# Patient Record
Sex: FEMALE | Race: WHITE | NOT HISPANIC OR LATINO | Employment: UNEMPLOYED | URBAN - NONMETROPOLITAN AREA
[De-identification: names, ages, dates, MRNs, and addresses within clinical notes are randomized per-mention and may not be internally consistent; named-entity substitution may affect disease eponyms.]

---

## 2018-01-31 ENCOUNTER — TELEPHONE (OUTPATIENT)
Dept: URGENT CARE | Facility: CLINIC | Age: 28
End: 2018-01-31

## 2018-02-01 ENCOUNTER — TELEPHONE (OUTPATIENT)
Dept: URGENT CARE | Facility: CLINIC | Age: 28
End: 2018-02-01

## 2018-02-01 DIAGNOSIS — R10.11 RUQ ABDOMINAL PAIN: Primary | ICD-10-CM

## 2018-02-01 NOTE — TELEPHONE ENCOUNTER
Pt called for lab results. She has watched diet and seen some improvement in RUQ pain. Would like to be sent to GI if possible or get recommendation on treatment.   Gave permission to call her mother Megha at [phone] with information.

## 2018-02-02 ENCOUNTER — HOSPITAL ENCOUNTER (OUTPATIENT)
Dept: ULTRASOUND IMAGING | Facility: HOSPITAL | Age: 28
Discharge: HOME | End: 2018-02-02
Admitting: FAMILY MEDICINE

## 2018-02-02 DIAGNOSIS — R10.11 RUQ ABDOMINAL PAIN: ICD-10-CM

## 2018-02-02 PROCEDURE — 76705 ECHO EXAM OF ABDOMEN: CPT

## 2018-12-31 ENCOUNTER — HOSPITAL ENCOUNTER (EMERGENCY)
Facility: HOSPITAL | Age: 28
Discharge: HOME | End: 2018-12-31
Attending: EMERGENCY MEDICINE | Admitting: EMERGENCY MEDICINE

## 2018-12-31 ENCOUNTER — APPOINTMENT (OUTPATIENT)
Dept: GENERAL RADIOLOGY | Facility: HOSPITAL | Age: 28
End: 2018-12-31

## 2018-12-31 VITALS
BODY MASS INDEX: 38.92 KG/M2 | HEIGHT: 66 IN | DIASTOLIC BLOOD PRESSURE: 25 MMHG | SYSTOLIC BLOOD PRESSURE: 119 MMHG | RESPIRATION RATE: 18 BRPM | HEART RATE: 80 BPM | TEMPERATURE: 97.4 F | WEIGHT: 242.2 LBS | OXYGEN SATURATION: 100 %

## 2018-12-31 DIAGNOSIS — R00.2 INTERMITTENT PALPITATIONS: ICD-10-CM

## 2018-12-31 DIAGNOSIS — R42 EPISODE OF DIZZINESS: Primary | ICD-10-CM

## 2018-12-31 LAB
ALBUMIN SERPL-MCNC: 4.4 G/DL (ref 3.5–5)
ALBUMIN/GLOB SERPL: 1.4 G/DL (ref 1–2)
ALP SERPL-CCNC: 115 U/L (ref 38–126)
ALT SERPL W P-5'-P-CCNC: 19 U/L (ref 13–69)
ANION GAP SERPL CALCULATED.3IONS-SCNC: 10.6 MMOL/L (ref 10–20)
AST SERPL-CCNC: 18 U/L (ref 15–46)
B-HCG UR QL: NEGATIVE
BASOPHILS # BLD AUTO: 0.07 10*3/MM3 (ref 0–0.2)
BASOPHILS NFR BLD AUTO: 0.6 % (ref 0–2.5)
BILIRUB SERPL-MCNC: 0.7 MG/DL (ref 0.2–1.3)
BUN BLD-MCNC: 9 MG/DL (ref 7–20)
BUN/CREAT SERPL: 15 (ref 7.1–23.5)
CALCIUM SPEC-SCNC: 8.8 MG/DL (ref 8.4–10.2)
CHLORIDE SERPL-SCNC: 106 MMOL/L (ref 98–107)
CO2 SERPL-SCNC: 26 MMOL/L (ref 26–30)
CREAT BLD-MCNC: 0.6 MG/DL (ref 0.6–1.3)
DEPRECATED RDW RBC AUTO: 42.4 FL (ref 37–54)
EOSINOPHIL # BLD AUTO: 0.12 10*3/MM3 (ref 0–0.7)
EOSINOPHIL NFR BLD AUTO: 1 % (ref 0–7)
ERYTHROCYTE [DISTWIDTH] IN BLOOD BY AUTOMATED COUNT: 13.8 % (ref 11.5–14.5)
GFR SERPL CREATININE-BSD FRML MDRD: 120 ML/MIN/1.73
GLOBULIN UR ELPH-MCNC: 3.2 GM/DL
GLUCOSE BLD-MCNC: 89 MG/DL (ref 74–98)
HCT VFR BLD AUTO: 37.1 % (ref 37–47)
HGB BLD-MCNC: 12.2 G/DL (ref 12–16)
IMM GRANULOCYTES # BLD AUTO: 0.04 10*3/MM3 (ref 0–0.06)
IMM GRANULOCYTES NFR BLD AUTO: 0.3 % (ref 0–0.6)
LIPASE SERPL-CCNC: 31 U/L (ref 23–300)
LYMPHOCYTES # BLD AUTO: 1.9 10*3/MM3 (ref 0.6–3.4)
LYMPHOCYTES NFR BLD AUTO: 15.3 % (ref 10–50)
MCH RBC QN AUTO: 27.6 PG (ref 27–31)
MCHC RBC AUTO-ENTMCNC: 32.9 G/DL (ref 30–37)
MCV RBC AUTO: 83.9 FL (ref 81–99)
MONOCYTES # BLD AUTO: 0.67 10*3/MM3 (ref 0–0.9)
MONOCYTES NFR BLD AUTO: 5.4 % (ref 0–12)
NEUTROPHILS # BLD AUTO: 9.64 10*3/MM3 (ref 2–6.9)
NEUTROPHILS NFR BLD AUTO: 77.4 % (ref 37–80)
NRBC BLD AUTO-RTO: 0 /100 WBC (ref 0–0)
PLATELET # BLD AUTO: 239 10*3/MM3 (ref 130–400)
PMV BLD AUTO: 9.6 FL (ref 6–12)
POTASSIUM BLD-SCNC: 3.6 MMOL/L (ref 3.5–5.1)
PROT SERPL-MCNC: 7.6 G/DL (ref 6.3–8.2)
RBC # BLD AUTO: 4.42 10*6/MM3 (ref 4.2–5.4)
SODIUM BLD-SCNC: 139 MMOL/L (ref 137–145)
TROPONIN I SERPL-MCNC: <0.012 NG/ML (ref 0–0.03)
WBC NRBC COR # BLD: 12.44 10*3/MM3 (ref 4.8–10.8)

## 2018-12-31 PROCEDURE — 71046 X-RAY EXAM CHEST 2 VIEWS: CPT

## 2018-12-31 PROCEDURE — 93005 ELECTROCARDIOGRAM TRACING: CPT | Performed by: PHYSICIAN ASSISTANT

## 2018-12-31 PROCEDURE — 80053 COMPREHEN METABOLIC PANEL: CPT | Performed by: PHYSICIAN ASSISTANT

## 2018-12-31 PROCEDURE — 83690 ASSAY OF LIPASE: CPT | Performed by: PHYSICIAN ASSISTANT

## 2018-12-31 PROCEDURE — 81025 URINE PREGNANCY TEST: CPT | Performed by: PHYSICIAN ASSISTANT

## 2018-12-31 PROCEDURE — 85025 COMPLETE CBC W/AUTO DIFF WBC: CPT | Performed by: PHYSICIAN ASSISTANT

## 2018-12-31 PROCEDURE — 96360 HYDRATION IV INFUSION INIT: CPT

## 2018-12-31 PROCEDURE — 84484 ASSAY OF TROPONIN QUANT: CPT | Performed by: PHYSICIAN ASSISTANT

## 2018-12-31 PROCEDURE — 99283 EMERGENCY DEPT VISIT LOW MDM: CPT

## 2018-12-31 RX ORDER — SODIUM CHLORIDE 0.9 % (FLUSH) 0.9 %
10 SYRINGE (ML) INJECTION AS NEEDED
Status: DISCONTINUED | OUTPATIENT
Start: 2018-12-31 | End: 2018-12-31 | Stop reason: HOSPADM

## 2018-12-31 RX ADMIN — SODIUM CHLORIDE 1000 ML: 9 INJECTION, SOLUTION INTRAVENOUS at 15:28

## 2019-01-17 ENCOUNTER — OFFICE VISIT (OUTPATIENT)
Dept: FAMILY MEDICINE CLINIC | Facility: CLINIC | Age: 29
End: 2019-01-17

## 2019-01-17 VITALS
HEIGHT: 66 IN | HEART RATE: 95 BPM | SYSTOLIC BLOOD PRESSURE: 124 MMHG | BODY MASS INDEX: 38.41 KG/M2 | RESPIRATION RATE: 14 BRPM | WEIGHT: 239 LBS | DIASTOLIC BLOOD PRESSURE: 82 MMHG | OXYGEN SATURATION: 99 %

## 2019-01-17 DIAGNOSIS — R53.83 MALAISE AND FATIGUE: ICD-10-CM

## 2019-01-17 DIAGNOSIS — Z00.00 ROUTINE GENERAL MEDICAL EXAMINATION AT A HEALTH CARE FACILITY: Primary | ICD-10-CM

## 2019-01-17 DIAGNOSIS — G43.C0 PERIODIC HEADACHE SYNDROME, NOT INTRACTABLE: ICD-10-CM

## 2019-01-17 DIAGNOSIS — R53.81 MALAISE AND FATIGUE: ICD-10-CM

## 2019-01-17 DIAGNOSIS — K59.09 OTHER CONSTIPATION: ICD-10-CM

## 2019-01-17 LAB
BASOPHILS # BLD AUTO: 0.06 10*3/MM3 (ref 0–0.2)
BASOPHILS NFR BLD AUTO: 0.7 % (ref 0–2.5)
EOSINOPHIL # BLD AUTO: 0.17 10*3/MM3 (ref 0–0.7)
EOSINOPHIL NFR BLD AUTO: 2.1 % (ref 0–7)
ERYTHROCYTE [DISTWIDTH] IN BLOOD BY AUTOMATED COUNT: 13.9 % (ref 11.5–14.5)
FERRITIN SERPL-MCNC: 46.9 NG/ML (ref 6.24–137)
HCT VFR BLD AUTO: 40.6 % (ref 37–47)
HGB BLD-MCNC: 12.9 G/DL (ref 12–16)
IMM GRANULOCYTES # BLD AUTO: 0.02 10*3/MM3 (ref 0–0.06)
IMM GRANULOCYTES NFR BLD AUTO: 0.2 % (ref 0–0.6)
IRON SATN MFR SERPL: 20 % (ref 11–46)
IRON SERPL-MCNC: 67 MCG/DL (ref 37–181)
LYMPHOCYTES # BLD AUTO: 1.96 10*3/MM3 (ref 0.6–3.4)
LYMPHOCYTES NFR BLD AUTO: 23.8 % (ref 10–50)
MCH RBC QN AUTO: 28.2 PG (ref 27–31)
MCHC RBC AUTO-ENTMCNC: 31.8 G/DL (ref 30–37)
MCV RBC AUTO: 88.6 FL (ref 81–99)
MONOCYTES # BLD AUTO: 0.54 10*3/MM3 (ref 0–0.9)
MONOCYTES NFR BLD AUTO: 6.6 % (ref 0–12)
NEUTROPHILS # BLD AUTO: 5.47 10*3/MM3 (ref 2–6.9)
NEUTROPHILS NFR BLD AUTO: 66.6 % (ref 37–80)
NRBC BLD AUTO-RTO: 0 /100 WBC (ref 0–0)
PLATELET # BLD AUTO: 246 10*3/MM3 (ref 130–400)
RBC # BLD AUTO: 4.58 10*6/MM3 (ref 4.2–5.4)
T4 FREE SERPL-MCNC: 1 NG/DL (ref 0.78–2.19)
TIBC SERPL-MCNC: 336 MCG/DL (ref 261–497)
TSH SERPL DL<=0.005 MIU/L-ACNC: 0.93 MIU/ML (ref 0.47–4.68)
UIBC SERPL-MCNC: 269 MCG/DL
VIT B12 SERPL-MCNC: 317 PG/ML (ref 239–931)
WBC # BLD AUTO: 8.22 10*3/MM3 (ref 4.8–10.8)

## 2019-01-17 PROCEDURE — 99385 PREV VISIT NEW AGE 18-39: CPT | Performed by: FAMILY MEDICINE

## 2019-01-17 NOTE — PATIENT INSTRUCTIONS
Preventive Care 18-39 Years, Female  Preventive care refers to lifestyle choices and visits with your health care provider that can promote health and wellness.  What does preventive care include?  · A yearly physical exam. This is also called an annual well check.  · Dental exams once or twice a year.  · Routine eye exams. Ask your health care provider how often you should have your eyes checked.  · Personal lifestyle choices, including:  ? Daily care of your teeth and gums.  ? Regular physical activity.  ? Eating a healthy diet.  ? Avoiding tobacco and drug use.  ? Limiting alcohol use.  ? Practicing safe sex.  ? Taking vitamin and mineral supplements as recommended by your health care provider.  What happens during an annual well check?  The services and screenings done by your health care provider during your annual well check will depend on your age, overall health, lifestyle risk factors, and family history of disease.  Counseling  Your health care provider may ask you questions about your:  · Alcohol use.  · Tobacco use.  · Drug use.  · Emotional well-being.  · Home and relationship well-being.  · Sexual activity.  · Eating habits.  · Work and work environment.  · Method of birth control.  · Menstrual cycle.  · Pregnancy history.    Screening  You may have the following tests or measurements:  · Height, weight, and BMI.  · Diabetes screening. This is done by checking your blood sugar (glucose) after you have not eaten for a while (fasting).  · Blood pressure.  · Lipid and cholesterol levels. These may be checked every 5 years starting at age 20.  · Skin check.  · Hepatitis C blood test.  · Hepatitis B blood test.  · Sexually transmitted disease (STD) testing.  · BRCA-related cancer screening. This may be done if you have a family history of breast, ovarian, tubal, or peritoneal cancers.  · Pelvic exam and Pap test. This may be done every 3 years starting at age 21. Starting at age 30, this may be done  every 5 years if you have a Pap test in combination with an HPV test.    Discuss your test results, treatment options, and if necessary, the need for more tests with your health care provider.  Vaccines  Your health care provider may recommend certain vaccines, such as:  · Influenza vaccine. This is recommended every year.  · Tetanus, diphtheria, and acellular pertussis (Tdap, Td) vaccine. You may need a Td booster every 10 years.  · Varicella vaccine. You may need this if you have not been vaccinated.  · HPV vaccine. If you are 26 or younger, you may need three doses over 6 months.  · Measles, mumps, and rubella (MMR) vaccine. You may need at least one dose of MMR. You may also need a second dose.  · Pneumococcal 13-valent conjugate (PCV13) vaccine. You may need this if you have certain conditions and were not previously vaccinated.  · Pneumococcal polysaccharide (PPSV23) vaccine. You may need one or two doses if you smoke cigarettes or if you have certain conditions.  · Meningococcal vaccine. One dose is recommended if you are age 19-21 years and a first-year college student living [place], or if you have one of several medical conditions. You may also need additional booster doses.  · Hepatitis A vaccine. You may need this if you have certain conditions or if you travel or work in places where you may be exposed to hepatitis A.  · Hepatitis B vaccine. You may need this if you have certain conditions or if you travel or work in places where you may be exposed to hepatitis B.  · Haemophilus influenzae type b (Hib) vaccine. You may need this if you have certain risk factors.    Talk to your health care provider about which screenings and vaccines you need and how often you need them.  This information is not intended to replace advice given to you by your health care provider. Make sure you discuss any questions you have with your health care provider.  Document Released: 02/13/2003 Document Revised:  09/06/2017 Document Reviewed: 10/18/2016  [employer] Interactive Patient Education © 2018 [employer]

## 2019-01-17 NOTE — PROGRESS NOTES
New Patient History and Physical      Referring Physician: No ref. provider found    Chief Complaint:    Chief Complaint   Patient presents with   • Establish Care     No current issues needing to establish PCP       History of Present Illness:   Malaise/fatigue; occ palpitations; hx of depression, failed tolerance of lexapro.  No SI/HI.    Periodic HA, without other concerning neurological symptoms.    Constipation chronically; has inc hydration in effort to improve symptoms.    Subjective     Review of Systems     1. Constitutional: Negative for fever. Negative for chills, diaphoresis.  Periodic malaise/fatigue and unexpected weight change.   2. HENT: No dysphagia; no changes to vision/hearing/smell/taste; no epistaxis  3. Eyes: Negative for redness and visual disturbance.   4. Respiratory: negative for shortness of breath. Negative for chest pain . Negative for cough and chest tightness.   5. Cardiovascular: Negative for chest pain and palpitations.   6. Gastrointestinal: Negative for abdominal distention, abdominal pain and blood in stool.  Constipation as per above.  7. Endocrine: Negative for cold intolerance and heat intolerance.   8. Genitourinary: Negative for difficulty urinating, dysuria and frequency.   9. Musculoskeletal: Negative for arthralgias, back pain and myalgias.   10. Skin: Negative for color change, rash and wound.   11. Neurological: Negative for syncope, weakness.  Headaches as per above.  12. Hematological: Negative for adenopathy. Does not bruise/bleed easily.   13. Psychiatric/Behavioral: Negative for confusion. The patient is not nervous/anxious.     The following portions of the patient's history were reviewed and updated as appropriate: allergies, current medications, past family history, past medical history, past social history, past surgical history and problem list.    Past Medical History:   Past Medical History:   Diagnosis Date   • Depression    • Hx of migraine headaches  "       Past Surgical History:  Past Surgical History:   Procedure Laterality Date   • EYE SURGERY  2007    upper eyelid    • EYE SURGERY  2015    upper eyelid       Family History: family history includes Arthritis in her paternal grandmother; Cancer in her maternal grandmother and paternal grandfather; Hypertension in her father and mother.    Social History:  reports that  has never smoked. she has never used smokeless tobacco. She reports that she does not drink alcohol or use drugs.    Medications:  No current outpatient medications on file.    Allergies:  Allergies   Allergen Reactions   • Zithromax [Azithromycin] Anaphylaxis       Objective     Physical Exam:  Vital Signs: /82   Pulse 95   Resp 14   Ht 167.6 cm (66\")   Wt 108 kg (239 lb)   LMP 12/31/2018 (Exact Date)   SpO2 99%   BMI 38.58 kg/m²      General Appearance: alert, oriented x 3, no acute distress.  Skin: warm and dry.   HEENT: Atraumatic.  pupils round and reactive to light and accommodation, oral mucosa pink and moist.  Nares patent without epistaxis.  External auditory canals are patent tympanic membranes intact.  Neck: supple, no JVD, trachea midline.  No thyromegaly  Lungs: CTA, unlabored breathing effort.  Heart: RRR, normal S1 and S2, no S3, no rub.  Abdomen: soft, non-tender, no palpable bladder, present bowel sounds to auscultation ×4.  No guarding or rigidity.  Extremities: no clubbing, cyanosis or edema.  Good range of motion actively and passively.  Symmetric muscle strength and development  Neuro: normal speech and mental status.  Cranial nerves II through XII intact.  No anosmia. DTR 2+; proprioception intact.  No focal motor/sensory deficits.        Assessment / Plan     Assessment:   Becky was seen today for establish care.    Diagnoses and all orders for this visit:    Routine general medical examination at a health care facility  -     CBC & Differential  -     NMR LipoProfile    BMI 38.0-38.9,adult    Malaise and " fatigue  -     CBC & Differential  -     TSH  -     T4, Free  -     Iron Profile  -     Ferritin  -     Vitamin B12    Other constipation  -     CBC & Differential  -     TSH  -     T4, Free  -     Iron Profile  -     Ferritin    Periodic headache syndrome, not intractable  -     CBC & Differential  -     TSH  -     T4, Free  -     Iron Profile  -     Ferritin  -     Vitamin B12        Plan:  Age/gender specific preventative healthcare issues discussed in detail with patient today.  I've answered all of her questions.    Metabolic workup including iron/ferritin/B12 levels due to her periodic headache syndrome.    Patient defers counseling services for her history of depression.  She denies any suicidal or homicidal ideation at this time.  Discussion Summary:  Discussed need for reduction in sodium/salt/caffeine intake; improve sleep habits as able; inc formal CV exercise program with goal of vigorous activity most, if not all, days of the week; goal of 50 min of sustained HR CV exercise.    Discussed plan of care in detail with pt today; pt verb understanding and agrees; counseled for approx 35 min of total 45 min exam time.  Follow up:  Return in 6 months (on 7/17/2019).     Patient Instructions       Preventive Care 18-39 Years, Female  Preventive care refers to lifestyle choices and visits with your health care provider that can promote health and wellness.  What does preventive care include?  · A yearly physical exam. This is also called an annual well check.  · Dental exams once or twice a year.  · Routine eye exams. Ask your health care provider how often you should have your eyes checked.  · Personal lifestyle choices, including:  ? Daily care of your teeth and gums.  ? Regular physical activity.  ? Eating a healthy diet.  ? Avoiding tobacco and drug use.  ? Limiting alcohol use.  ? Practicing safe sex.  ? Taking vitamin and mineral supplements as recommended by your health care provider.  What happens during  an annual well check?  The services and screenings done by your health care provider during your annual well check will depend on your age, overall health, lifestyle risk factors, and family history of disease.  Counseling  Your health care provider may ask you questions about your:  · Alcohol use.  · Tobacco use.  · Drug use.  · Emotional well-being.  · Home and relationship well-being.  · Sexual activity.  · Eating habits.  · Work and work environment.  · Method of birth control.  · Menstrual cycle.  · Pregnancy history.    Screening  You may have the following tests or measurements:  · Height, weight, and BMI.  · Diabetes screening. This is done by checking your blood sugar (glucose) after you have not eaten for a while (fasting).  · Blood pressure.  · Lipid and cholesterol levels. These may be checked every 5 years starting at age 20.  · Skin check.  · Hepatitis C blood test.  · Hepatitis B blood test.  · Sexually transmitted disease (STD) testing.  · BRCA-related cancer screening. This may be done if you have a family history of breast, ovarian, tubal, or peritoneal cancers.  · Pelvic exam and Pap test. This may be done every 3 years starting at age 21. Starting at age 30, this may be done every 5 years if you have a Pap test in combination with an HPV test.    Discuss your test results, treatment options, and if necessary, the need for more tests with your health care provider.  Vaccines  Your health care provider may recommend certain vaccines, such as:  · Influenza vaccine. This is recommended every year.  · Tetanus, diphtheria, and acellular pertussis (Tdap, Td) vaccine. You may need a Td booster every 10 years.  · Varicella vaccine. You may need this if you have not been vaccinated.  · HPV vaccine. If you are 26 or younger, you may need three doses over 6 months.  · Measles, mumps, and rubella (MMR) vaccine. You may need at least one dose of MMR. You may also need a second dose.  · Pneumococcal 13-valent  conjugate (PCV13) vaccine. You may need this if you have certain conditions and were not previously vaccinated.  · Pneumococcal polysaccharide (PPSV23) vaccine. You may need one or two doses if you smoke cigarettes or if you have certain conditions.  · Meningococcal vaccine. One dose is recommended if you are age 19-21 years and a first-year college student living [place] a [place], or if you have one of several medical conditions. You may also need additional booster doses.  · Hepatitis A vaccine. You may need this if you have certain conditions or if you travel or work in places where you may be exposed to hepatitis A.  · Hepatitis B vaccine. You may need this if you have certain conditions or if you travel or work in places where you may be exposed to hepatitis B.  · Haemophilus influenzae type b (Hib) vaccine. You may need this if you have certain risk factors.    Talk to your health care provider about which screenings and vaccines you need and how often you need them.  This information is not intended to replace advice given to you by your health care provider. Make sure you discuss any questions you have with your health care provider.  Document Released: 02/13/2003 Document Revised: 09/06/2017 Document Reviewed: 10/18/2016  [employer] Interactive Patient Education © 2018 [employer]        Reuben Mercado DO  01/17/19  11:13 AM    Please note that portions of this note may have been completed with a voice recognition program. Efforts were made to edit the dictations, but occasionally words are mistranscribed.

## 2019-02-19 ENCOUNTER — OFFICE VISIT (OUTPATIENT)
Dept: FAMILY MEDICINE CLINIC | Facility: CLINIC | Age: 29
End: 2019-02-19

## 2019-02-19 VITALS
SYSTOLIC BLOOD PRESSURE: 122 MMHG | HEIGHT: 66 IN | HEART RATE: 83 BPM | WEIGHT: 228 LBS | BODY MASS INDEX: 36.64 KG/M2 | DIASTOLIC BLOOD PRESSURE: 82 MMHG | RESPIRATION RATE: 14 BRPM | OXYGEN SATURATION: 99 %

## 2019-02-19 DIAGNOSIS — M75.81 ROTATOR CUFF TENDONITIS, RIGHT: ICD-10-CM

## 2019-02-19 DIAGNOSIS — M25.511 ARTHRALGIA OF RIGHT SHOULDER REGION: Primary | ICD-10-CM

## 2019-02-19 PROBLEM — Z87.39 HISTORY OF CLOSED SHOULDER DISLOCATION: Status: ACTIVE | Noted: 2019-02-19

## 2019-02-19 PROCEDURE — 99213 OFFICE O/P EST LOW 20 MIN: CPT | Performed by: FAMILY MEDICINE

## 2019-02-19 RX ORDER — DEXAMETHASONE 0.5 MG/1
TABLET ORAL
Qty: 21 TABLET | Refills: 0 | OUTPATIENT
Start: 2019-02-19 | End: 2019-03-11

## 2019-02-19 NOTE — PATIENT INSTRUCTIONS
Rotator Cuff Tendinitis  Rotator cuff tendinitis is inflammation of the tough, cord-like bands that connect muscle to bone (tendons) in the rotator cuff. The rotator cuff includes all of the muscles and tendons that connect the arm to the shoulder. The rotator cuff holds the head of the upper arm bone (humerus) in the cup (fossa) of the shoulder blade (scapula).  This condition can lead to a long-lasting (chronic) tear. The tear may be partial or complete.  What are the causes?  This condition is usually caused by overusing the rotator cuff.  What increases the risk?  This condition is more likely to develop in athletes and workers who frequently use their shoulder or reach over their heads. This can include activities such as:  · Tennis.  · Baseball or softball.  · Swimming.  · Construction work.  · Painting.    What are the signs or symptoms?  Symptoms of this condition include:  · Pain spreading (radiating) from the shoulder to the upper arm.  · Swelling and tenderness in front of the shoulder.  · Pain when reaching, pulling, or lifting the arm above the head.  · Pain when lowering the arm from above the head.  · Minor pain in the shoulder when resting.  · Increased pain in the shoulder at night.  · Difficulty placing the arm behind the back.    How is this diagnosed?  This condition is diagnosed with a medical history and physical exam. Tests may also be done, including:  · X-rays.  · MRI.  · Ultrasounds.  · CT or MR arthrogram. During this test, a contrast material is injected and then images are taken.    How is this treated?  Treatment for this condition depends on the severity of the condition. In less severe cases, treatment may include:  · Rest. This may be done with a sling that holds the shoulder still (immobilization). Your health care provider may also recommend avoiding activities that involve lifting your arm over your head.  · Icing the shoulder.  · Anti-inflammatory medicines, such as aspirin or  ibuprofen.    In more severe cases, treatment may include:  · Physical therapy.  · Steroid injections.  · Surgery.    Follow these instructions at home:  If you have a sling:  · Wear the sling as told by your health care provider. Remove it only as told by your health care provider.  · Loosen the sling if your fingers tingle, become numb, or turn cold and blue.  · Keep the sling clean.  · If the sling is not waterproof, do not let it get wet. Remove it, if allowed, or cover it with a watertight covering when you take a bath or shower.  Managing pain, stiffness, and swelling  · If directed, put ice on the injured area.  ? If you have a removable sling, remove it as told by your health care provider.  ? Put ice in a plastic bag.  ? Place a towel between your skin and the bag.  ? Leave the ice on for 20 minutes, 2-3 times a day.  · Move your fingers often to avoid stiffness and to lessen swelling.  · Raise (elevate) the injured area above the level of your heart while you are lying down.  · Find a comfortable sleeping position or sleep on a recliner, if available.  Driving  · Do not drive or use heavy machinery while taking prescription pain medicine.  · Ask your health care provider when it is safe to drive if you have a sling on your arm.  Activity  · Rest your shoulder as told by your health care provider.  · Return to your normal activities as told by your health care provider. Ask your health care provider what activities are safe for you.  · Do any exercises or stretches as told by your health care provider.  · If you do repetitive overhead tasks, take small breaks in between and include stretching exercises as told by your health care provider.  General instructions  · Do not use any products that contain nicotine or tobacco, such as cigarettes and e-cigarettes. These can delay healing. If you need help quitting, ask your health care provider.  · Take over-the-counter and prescription medicines only as told by  your health care provider.  · Keep all follow-up visits as told by your health care provider. This is important.  Contact a health care provider if:  · Your pain gets worse.  · You have new pain in your arm, hands, or fingers.  · Your pain is not relieved with medicine or does not get better after 6 weeks of treatment.  · You have cracking sensations when moving your shoulder in certain directions.  · You hear a snapping sound after using your shoulder, followed by severe pain and weakness.  Get help right away if:  · Your arm, hand, or fingers are numb or tingling.  · Your arm, hand, or fingers are swollen or painful or they turn white or blue.  Summary  · Rotator cuff tendinitis is inflammation of the tough, cord-like bands that connect muscle to bone (tendons) in the rotator cuff.  · This condition is usually caused by overusing the rotator cuff, which includes all of the muscles and tendons that connect the arm to the shoulder.  · This condition is more likely to develop in athletes and workers who frequently use their shoulder or reach over their heads.  · Treatment generally includes rest, anti-inflammatory medicines, and icing. In some cases, physical therapy and steroid injections may be needed. In severe cases, surgery may be needed.  This information is not intended to replace advice given to you by your health care provider. Make sure you discuss any questions you have with your health care provider.  Document Released: 03/09/2005 Document Revised: 12/04/2017 Document Reviewed: 12/04/2017  [employer] Interactive Patient Education © 2017 [employer]

## 2019-02-19 NOTE — PROGRESS NOTES
Established Patient        Chief Complaint:   Chief Complaint   Patient presents with   • Shoulder Pain     Dislocated shoulder a couple months ago, still having problems        Becky Garcia is a 28 y.o. female    History of Present Illness:   Patient reports acute worsening of right shoulder discomfort over the course of the last several days.  She gives no identifiable episodes of trauma or known injury.  She does routinely perform repetitive lifting activities at her place of employment.  She is right arm dominant.  Pain is continuous, worsened with certain movements and when lying on that side while sleeping.  She denies any fever or chills.  Denies any weakness to her  strength, but has noticed hesitation to utilize the right upper extremity secondary to the discomfort.  Denies any loss of sensation.    Subjective     The following portions of the patient's history were reviewed and updated as appropriate: allergies, current medications, past family history, past medical history, past social history, past surgical history and problem list.    Allergies   Allergen Reactions   • Zithromax [Azithromycin] Anaphylaxis       Review of Systems  1. Constitutional: Negative for fever. Negative for chills, diaphoresis, fatigue and unexpected weight change.   2. HENT: No dysphagia; no changes to vision/hearing/smell/taste; no epistaxis  3. Eyes: Negative for redness and visual disturbance.   4. Respiratory: negative for shortness of breath. Negative for chest pain . Negative for cough and chest tightness.   5. Cardiovascular: Negative for chest pain and palpitations.   6. Gastrointestinal: Negative for abdominal distention, abdominal pain and blood in stool.   7. Endocrine: Negative for cold intolerance and heat intolerance.   8. Genitourinary: Negative for difficulty urinating, dysuria and frequency.   9. Musculoskeletal: Right shoulder pain as per above.  10. Skin: Negative for color change, rash and  "wound.   11. Neurological: Negative for syncope, weakness and headaches.   12. Hematological: Negative for adenopathy. Does not bruise/bleed easily.   13. Psychiatric/Behavioral: Negative for confusion. The patient is not nervous/anxious.    Objective     Physical Exam   Vital Signs: /82   Pulse 83   Resp 14   Ht 167.6 cm (66\")   Wt 103 kg (228 lb)   SpO2 99%   BMI 36.80 kg/m²     General Appearance: alert, oriented x 3, no acute distress.  Skin: warm and dry.   HEENT: Atraumatic.  pupils round and reactive to light and accommodation, oral mucosa pink and moist.  Nares patent without epistaxis.  External auditory canals are patent tympanic membranes intact.  Neck: supple, no JVD, trachea midline.  No thyromegaly  Lungs: CTA, unlabored breathing effort.  Heart: RRR, normal S1 and S2, no S3, no rub.  Abdomen: soft, non-tender, no palpable bladder, present bowel sounds to auscultation ×4.  No guarding or rigidity.  Extremities: no clubbing, cyanosis or edema.  Normal tone and development to bilateral deltoids.  Decreased range of motion to the right shoulder, empty can and Cristina test positive for discomfort.  Pain on palpation along the supraspinatus tendon passively.  Lateral subacromial space tenderness on palpation.  Negative shrug sign, push off is normal.   strength symmetric bilaterally.  Intact supination and pronation of bilateral forearms.  Apprehension sign negative.  [place]'s negative.  Neuro: normal speech and mental status.  Cranial nerves II through XII intact.  No anosmia. DTR 2+; proprioception intact.  No focal motor/sensory deficits.    Assessment and Plan      Assessment:   Becky was seen today for shoulder pain.    Diagnoses and all orders for this visit:    Arthralgia of right shoulder region  -     dexamethasone (DECADRON) 0.5 MG tablet; 6 po x1d; then 5 po x 1d; then 4 po x 1d; then 3 po x 1d; then 2 po x 1d; then 1 po x1d; then STOP    Rotator cuff tendonitis, right  -     " dexamethasone (DECADRON) 0.5 MG tablet; 6 po x1d; then 5 po x 1d; then 4 po x 1d; then 3 po x 1d; then 2 po x 1d; then 1 po x1d; then STOP        Plan:  I've offered several treatment options to patient today.  She defers joint injection, as well as oral nonsteroidal anti-inflammatory use.  I have recommended that she continue with ice compress therapy, avoidance of repetitive or heavy lifting at this time.  She elects to undergo a steroid taper therapy, utilizing dexamethasone 0.5 mg tablets, a tapering dose over the next 6 days.  I've instructed her should she not continue to improve or worsen she is to return to the clinic or contact the office for direction.  Discussion Summary:    Discussed plan of care in detail with pt today; pt verb understanding and agrees; counseled for approx 10 min of total 15 min exam time.  Follow up:  Return if symptoms worsen or fail to improve.     Patient Instructions   Rotator Cuff Tendinitis  Rotator cuff tendinitis is inflammation of the tough, cord-like bands that connect muscle to bone (tendons) in the rotator cuff. The rotator cuff includes all of the muscles and tendons that connect the arm to the shoulder. The rotator cuff holds the head of the upper arm bone (humerus) in the cup (fossa) of the shoulder blade (scapula).  This condition can lead to a long-lasting (chronic) tear. The tear may be partial or complete.  What are the causes?  This condition is usually caused by overusing the rotator cuff.  What increases the risk?  This condition is more likely to develop in athletes and workers who frequently use their shoulder or reach over their heads. This can include activities such as:  · Tennis.  · Baseball or softball.  · Swimming.  · Construction work.  · Painting.    What are the signs or symptoms?  Symptoms of this condition include:  · Pain spreading (radiating) from the shoulder to the upper arm.  · Swelling and tenderness in front of the shoulder.  · Pain when  reaching, pulling, or lifting the arm above the head.  · Pain when lowering the arm from above the head.  · Minor pain in the shoulder when resting.  · Increased pain in the shoulder at night.  · Difficulty placing the arm behind the back.    How is this diagnosed?  This condition is diagnosed with a medical history and physical exam. Tests may also be done, including:  · X-rays.  · MRI.  · Ultrasounds.  · CT or MR arthrogram. During this test, a contrast material is injected and then images are taken.    How is this treated?  Treatment for this condition depends on the severity of the condition. In less severe cases, treatment may include:  · Rest. This may be done with a sling that holds the shoulder still (immobilization). Your health care provider may also recommend avoiding activities that involve lifting your arm over your head.  · Icing the shoulder.  · Anti-inflammatory medicines, such as aspirin or ibuprofen.    In more severe cases, treatment may include:  · Physical therapy.  · Steroid injections.  · Surgery.    Follow these instructions at home:  If you have a sling:  · Wear the sling as told by your health care provider. Remove it only as told by your health care provider.  · Loosen the sling if your fingers tingle, become numb, or turn cold and blue.  · Keep the sling clean.  · If the sling is not waterproof, do not let it get wet. Remove it, if allowed, or cover it with a watertight covering when you take a bath or shower.  Managing pain, stiffness, and swelling  · If directed, put ice on the injured area.  ? If you have a removable sling, remove it as told by your health care provider.  ? Put ice in a plastic bag.  ? Place a towel between your skin and the bag.  ? Leave the ice on for 20 minutes, 2-3 times a day.  · Move your fingers often to avoid stiffness and to lessen swelling.  · Raise (elevate) the injured area above the level of your heart while you are lying down.  · Find a comfortable  sleeping position or sleep on a recliner, if available.  Driving  · Do not drive or use heavy machinery while taking prescription pain medicine.  · Ask your health care provider when it is safe to drive if you have a sling on your arm.  Activity  · Rest your shoulder as told by your health care provider.  · Return to your normal activities as told by your health care provider. Ask your health care provider what activities are safe for you.  · Do any exercises or stretches as told by your health care provider.  · If you do repetitive overhead tasks, take small breaks in between and include stretching exercises as told by your health care provider.  General instructions  · Do not use any products that contain nicotine or tobacco, such as cigarettes and e-cigarettes. These can delay healing. If you need help quitting, ask your health care provider.  · Take over-the-counter and prescription medicines only as told by your health care provider.  · Keep all follow-up visits as told by your health care provider. This is important.  Contact a health care provider if:  · Your pain gets worse.  · You have new pain in your arm, hands, or fingers.  · Your pain is not relieved with medicine or does not get better after 6 weeks of treatment.  · You have cracking sensations when moving your shoulder in certain directions.  · You hear a snapping sound after using your shoulder, followed by severe pain and weakness.  Get help right away if:  · Your arm, hand, or fingers are numb or tingling.  · Your arm, hand, or fingers are swollen or painful or they turn white or blue.  Summary  · Rotator cuff tendinitis is inflammation of the tough, cord-like bands that connect muscle to bone (tendons) in the rotator cuff.  · This condition is usually caused by overusing the rotator cuff, which includes all of the muscles and tendons that connect the arm to the shoulder.  · This condition is more likely to develop in athletes and workers who  frequently use their shoulder or reach over their heads.  · Treatment generally includes rest, anti-inflammatory medicines, and icing. In some cases, physical therapy and steroid injections may be needed. In severe cases, surgery may be needed.  This information is not intended to replace advice given to you by your health care provider. Make sure you discuss any questions you have with your health care provider.  Document Released: 03/09/2005 Document Revised: 12/04/2017 Document Reviewed: 12/04/2017  [employer] Interactive Patient Education © 2017 [employer]        Reuben Mercado DO  02/19/19  1:33 PM    Please note that portions of this note may have been completed with a voice recognition program. Efforts were made to edit the dictations, but occasionally words are mistranscribed.

## 2019-06-18 ENCOUNTER — TRANSCRIBE ORDERS (OUTPATIENT)
Dept: MRI IMAGING | Facility: HOSPITAL | Age: 29
End: 2019-06-18

## 2019-06-25 ENCOUNTER — OFFICE VISIT (OUTPATIENT)
Dept: FAMILY MEDICINE CLINIC | Facility: CLINIC | Age: 29
End: 2019-06-25

## 2019-06-25 VITALS
HEIGHT: 66 IN | BODY MASS INDEX: 36.64 KG/M2 | HEART RATE: 97 BPM | OXYGEN SATURATION: 98 % | SYSTOLIC BLOOD PRESSURE: 128 MMHG | DIASTOLIC BLOOD PRESSURE: 74 MMHG | WEIGHT: 228 LBS | RESPIRATION RATE: 14 BRPM

## 2019-06-25 DIAGNOSIS — M75.81 ROTATOR CUFF TENDONITIS, RIGHT: ICD-10-CM

## 2019-06-25 DIAGNOSIS — Z87.39 HISTORY OF CLOSED SHOULDER DISLOCATION: ICD-10-CM

## 2019-06-25 DIAGNOSIS — M25.511 ARTHRALGIA OF RIGHT SHOULDER REGION: Primary | ICD-10-CM

## 2019-06-25 PROCEDURE — 99214 OFFICE O/P EST MOD 30 MIN: CPT | Performed by: FAMILY MEDICINE

## 2019-06-25 NOTE — PATIENT INSTRUCTIONS
Rotator Cuff Tendinitis  Rotator cuff tendinitis is inflammation of the tough, cord-like bands that connect muscle to bone (tendons) in the rotator cuff. The rotator cuff includes all of the muscles and tendons that connect the arm to the shoulder. The rotator cuff holds the head of the upper arm bone (humerus) in the cup (fossa) of the shoulder blade (scapula).  This condition can lead to a long-lasting (chronic) tear. The tear may be partial or complete.  What are the causes?  This condition is usually caused by overusing the rotator cuff.  What increases the risk?  This condition is more likely to develop in athletes and workers who frequently use their shoulder or reach over their heads. This can include activities such as:  · Tennis.  · Baseball or softball.  · Swimming.  · Construction work.  · Painting.    What are the signs or symptoms?  Symptoms of this condition include:  · Pain spreading (radiating) from the shoulder to the upper arm.  · Swelling and tenderness in front of the shoulder.  · Pain when reaching, pulling, or lifting the arm above the head.  · Pain when lowering the arm from above the head.  · Minor pain in the shoulder when resting.  · Increased pain in the shoulder at night.  · Difficulty placing the arm behind the back.    How is this diagnosed?  This condition is diagnosed with a medical history and physical exam. Tests may also be done, including:  · X-rays.  · MRI.  · Ultrasounds.  · CT or MR arthrogram. During this test, a contrast material is injected and then images are taken.    How is this treated?  Treatment for this condition depends on the severity of the condition. In less severe cases, treatment may include:  · Rest. This may be done with a sling that holds the shoulder still (immobilization). Your health care provider may also recommend avoiding activities that involve lifting your arm over your head.  · Icing the shoulder.  · Anti-inflammatory medicines, such as aspirin or  ibuprofen.    In more severe cases, treatment may include:  · Physical therapy.  · Steroid injections.  · Surgery.    Follow these instructions at home:  If you have a sling:  · Wear the sling as told by your health care provider. Remove it only as told by your health care provider.  · Loosen the sling if your fingers tingle, become numb, or turn cold and blue.  · Keep the sling clean.  · If the sling is not waterproof, do not let it get wet. Remove it, if allowed, or cover it with a watertight covering when you take a bath or shower.  Managing pain, stiffness, and swelling  · If directed, put ice on the injured area.  ? If you have a removable sling, remove it as told by your health care provider.  ? Put ice in a plastic bag.  ? Place a towel between your skin and the bag.  ? Leave the ice on for 20 minutes, 2-3 times a day.  · Move your fingers often to avoid stiffness and to lessen swelling.  · Raise (elevate) the injured area above the level of your heart while you are lying down.  · Find a comfortable sleeping position or sleep on a recliner, if available.  Driving  · Do not drive or use heavy machinery while taking prescription pain medicine.  · Ask your health care provider when it is safe to drive if you have a sling on your arm.  Activity  · Rest your shoulder as told by your health care provider.  · Return to your normal activities as told by your health care provider. Ask your health care provider what activities are safe for you.  · Do any exercises or stretches as told by your health care provider.  · If you do repetitive overhead tasks, take small breaks in between and include stretching exercises as told by your health care provider.  General instructions  · Do not use any products that contain nicotine or tobacco, such as cigarettes and e-cigarettes. These can delay healing. If you need help quitting, ask your health care provider.  · Take over-the-counter and prescription medicines only as told by  your health care provider.  · Keep all follow-up visits as told by your health care provider. This is important.  Contact a health care provider if:  · Your pain gets worse.  · You have new pain in your arm, hands, or fingers.  · Your pain is not relieved with medicine or does not get better after 6 weeks of treatment.  · You have cracking sensations when moving your shoulder in certain directions.  · You hear a snapping sound after using your shoulder, followed by severe pain and weakness.  Get help right away if:  · Your arm, hand, or fingers are numb or tingling.  · Your arm, hand, or fingers are swollen or painful or they turn white or blue.  Summary  · Rotator cuff tendinitis is inflammation of the tough, cord-like bands that connect muscle to bone (tendons) in the rotator cuff.  · This condition is usually caused by overusing the rotator cuff, which includes all of the muscles and tendons that connect the arm to the shoulder.  · This condition is more likely to develop in athletes and workers who frequently use their shoulder or reach over their heads.  · Treatment generally includes rest, anti-inflammatory medicines, and icing. In some cases, physical therapy and steroid injections may be needed. In severe cases, surgery may be needed.  This information is not intended to replace advice given to you by your health care provider. Make sure you discuss any questions you have with your health care provider.  Document Released: 03/09/2005 Document Revised: 12/04/2017 Document Reviewed: 12/04/2017  [employer] Interactive Patient Education © 2019 [employer]

## 2019-06-25 NOTE — PROGRESS NOTES
Established Patient        Chief Complaint:   Chief Complaint   Patient presents with   • Shoulder Pain     Pain in right shoulder for about 3 weeks        Becky Garcia is a 28 y.o. female    History of Present Illness:   Patient is a 28-year-old female who presents today with complaints of right shoulder pain.  No focal trauma of significant nature.  Range of motion impaired secondary to discomfort, as well as a feeling of decreased strength.  Patient is right arm dominant.  It is affected her ability to sleep additionally due to the discomfort.  Patient states the pain is been most severe over the course of the last 3 weeks.  She gives a feeling of instability at times additionally.  Pain is in all ranges of motion of the upper extremity, predominantly involving the shoulder, denies any difficulty with her elbow or wrist.  She denies any paresthesias.  No abnormal rashes.  Denies any fever or chills.  Patient has repetitive activities routinely at her place of employment, and has been limited in her ability to perform these for an extended period of time now.    Subjective     The following portions of the patient's history were reviewed and updated as appropriate: allergies, current medications, past family history, past medical history, past social history, past surgical history and problem list.    Allergies   Allergen Reactions   • Zithromax [Azithromycin] Anaphylaxis       Review of Systems  1. Constitutional: Negative for fever. Negative for chills, diaphoresis, fatigue and unexpected weight change.   2. HENT: No dysphagia; no changes to vision/hearing/smell/taste; no epistaxis  3. Eyes: Negative for redness and visual disturbance.   4. Respiratory: negative for shortness of breath. Negative for chest pain . Negative for cough and chest tightness.   5. Cardiovascular: Negative for chest pain and palpitations.   6. Gastrointestinal: Negative for abdominal distention, abdominal pain and blood in  "stool.   7. Endocrine: Negative for cold intolerance and heat intolerance.   8. Genitourinary: Negative for difficulty urinating, dysuria and frequency.   9. Musculoskeletal: Right shoulder pain as per above.  10. Skin: Negative for color change, rash and wound.   11. Neurological: Negative for syncope, weakness and headaches.   12. Hematological: Negative for adenopathy. Does not bruise/bleed easily.   13. Psychiatric/Behavioral: Negative for confusion. The patient is not nervous/anxious.    Objective     Physical Exam   Vital Signs: /74   Pulse 97   Resp 14   Ht 167.6 cm (66\")   Wt 103 kg (228 lb)   LMP 06/02/2019 (Exact Date)   SpO2 98%   BMI 36.80 kg/m²     General Appearance: alert, oriented x 3, no acute distress.  Skin: warm and dry.   HEENT: Atraumatic.  pupils round and reactive to light and accommodation, oral mucosa pink and moist.  Nares patent without epistaxis.  External auditory canals are patent tympanic membranes intact.  Neck: supple, no JVD, trachea midline.  No thyromegaly  Lungs: CTA, unlabored breathing effort.  Heart: RRR, normal S1 and S2, no S3, no rub.  Abdomen: soft, non-tender, no palpable bladder, present bowel sounds to auscultation ×4.  No guarding or rigidity.  Extremities: no clubbing, cyanosis or edema.  Normal tone and development to bilateral deltoids.  Decreased range of motion to the right shoulder, empty can and Cristina test positive for discomfort.  Pain on palpation along the supraspinatus tendon passively.  Lateral subacromial space tenderness on palpation.  Negative shrug sign, push off is normal.   strength symmetric bilaterally.  Intact supination and pronation of bilateral forearms.  Apprehension sign negative.  Torrance's negative.  Without findings of winging of the scapula.  Neuro: normal speech and mental status.  Cranial nerves II through XII intact.  No anosmia. DTR 2+; proprioception intact.  No focal motor/sensory deficits.    Assessment and Plan  "     Assessment:   Becky was seen today for shoulder pain.    Diagnoses and all orders for this visit:    Arthralgia of right shoulder region  -     diclofenac (VOLTAREN) 50 MG EC tablet; Take 1 tablet by mouth 2 (Two) Times a Day With Meals.  -     Ambulatory Referral to Physical Therapy    History of closed shoulder dislocation  -     diclofenac (VOLTAREN) 50 MG EC tablet; Take 1 tablet by mouth 2 (Two) Times a Day With Meals.  -     Ambulatory Referral to Physical Therapy    Rotator cuff tendonitis, right  -     diclofenac (VOLTAREN) 50 MG EC tablet; Take 1 tablet by mouth 2 (Two) Times a Day With Meals.  -     Ambulatory Referral to Physical Therapy        Plan:  Patient is demonstrating significant impairment to her strength and utilization of her right upper extremity, as she is currently right arm dominant.  This most likely originates from rotator cuff tendinitis from repetitive/overuse syndrome.  She does have a history of closed shoulder dislocation in her medical past, most likely contributing to her degree of impaired shoulder function.  I have recommended a continuation of avoidance of repetitive and overuse activities of the right upper extremity, in addition to formal physical therapy evaluation and treatment including shoulder range of motion/strengthening/stabilization program.  Anti-inflammatories will be taken twice daily for a scheduled 2-week timeframe.  I will plan to see patient back in approximately 2 weeks for reevaluation, consideration of MRI for joint interrogation/evaluation at that time if needed.    Discussion Summary:    Discussed plan of care in detail with pt today; pt verb understanding and agrees; counseled for approx 15 min of total 25 min exam time.  Follow up:  Return in about 2 weeks (around 7/9/2019) for Recheck.     Patient Instructions   Rotator Cuff Tendinitis  Rotator cuff tendinitis is inflammation of the tough, cord-like bands that connect muscle to bone (tendons) in  the rotator cuff. The rotator cuff includes all of the muscles and tendons that connect the arm to the shoulder. The rotator cuff holds the head of the upper arm bone (humerus) in the cup (fossa) of the shoulder blade (scapula).  This condition can lead to a long-lasting (chronic) tear. The tear may be partial or complete.  What are the causes?  This condition is usually caused by overusing the rotator cuff.  What increases the risk?  This condition is more likely to develop in athletes and workers who frequently use their shoulder or reach over their heads. This can include activities such as:  · Tennis.  · Baseball or softball.  · Swimming.  · Construction work.  · Painting.    What are the signs or symptoms?  Symptoms of this condition include:  · Pain spreading (radiating) from the shoulder to the upper arm.  · Swelling and tenderness in front of the shoulder.  · Pain when reaching, pulling, or lifting the arm above the head.  · Pain when lowering the arm from above the head.  · Minor pain in the shoulder when resting.  · Increased pain in the shoulder at night.  · Difficulty placing the arm behind the back.    How is this diagnosed?  This condition is diagnosed with a medical history and physical exam. Tests may also be done, including:  · X-rays.  · MRI.  · Ultrasounds.  · CT or MR arthrogram. During this test, a contrast material is injected and then images are taken.    How is this treated?  Treatment for this condition depends on the severity of the condition. In less severe cases, treatment may include:  · Rest. This may be done with a sling that holds the shoulder still (immobilization). Your health care provider may also recommend avoiding activities that involve lifting your arm over your head.  · Icing the shoulder.  · Anti-inflammatory medicines, such as aspirin or ibuprofen.    In more severe cases, treatment may include:  · Physical therapy.  · Steroid injections.  · Surgery.    Follow these  instructions at home:  If you have a sling:  · Wear the sling as told by your health care provider. Remove it only as told by your health care provider.  · Loosen the sling if your fingers tingle, become numb, or turn cold and blue.  · Keep the sling clean.  · If the sling is not waterproof, do not let it get wet. Remove it, if allowed, or cover it with a watertight covering when you take a bath or shower.  Managing pain, stiffness, and swelling  · If directed, put ice on the injured area.  ? If you have a removable sling, remove it as told by your health care provider.  ? Put ice in a plastic bag.  ? Place a towel between your skin and the bag.  ? Leave the ice on for 20 minutes, 2-3 times a day.  · Move your fingers often to avoid stiffness and to lessen swelling.  · Raise (elevate) the injured area above the level of your heart while you are lying down.  · Find a comfortable sleeping position or sleep on a recliner, if available.  Driving  · Do not drive or use heavy machinery while taking prescription pain medicine.  · Ask your health care provider when it is safe to drive if you have a sling on your arm.  Activity  · Rest your shoulder as told by your health care provider.  · Return to your normal activities as told by your health care provider. Ask your health care provider what activities are safe for you.  · Do any exercises or stretches as told by your health care provider.  · If you do repetitive overhead tasks, take small breaks in between and include stretching exercises as told by your health care provider.  General instructions  · Do not use any products that contain nicotine or tobacco, such as cigarettes and e-cigarettes. These can delay healing. If you need help quitting, ask your health care provider.  · Take over-the-counter and prescription medicines only as told by your health care provider.  · Keep all follow-up visits as told by your health care provider. This is important.  Contact a health  care provider if:  · Your pain gets worse.  · You have new pain in your arm, hands, or fingers.  · Your pain is not relieved with medicine or does not get better after 6 weeks of treatment.  · You have cracking sensations when moving your shoulder in certain directions.  · You hear a snapping sound after using your shoulder, followed by severe pain and weakness.  Get help right away if:  · Your arm, hand, or fingers are numb or tingling.  · Your arm, hand, or fingers are swollen or painful or they turn white or blue.  Summary  · Rotator cuff tendinitis is inflammation of the tough, cord-like bands that connect muscle to bone (tendons) in the rotator cuff.  · This condition is usually caused by overusing the rotator cuff, which includes all of the muscles and tendons that connect the arm to the shoulder.  · This condition is more likely to develop in athletes and workers who frequently use their shoulder or reach over their heads.  · Treatment generally includes rest, anti-inflammatory medicines, and icing. In some cases, physical therapy and steroid injections may be needed. In severe cases, surgery may be needed.  This information is not intended to replace advice given to you by your health care provider. Make sure you discuss any questions you have with your health care provider.  Document Released: 03/09/2005 Document Revised: 12/04/2017 Document Reviewed: 12/04/2017  [employer] Patient Education © 2019 [employer]        Reuben Mercado DO  06/25/19  9:59 AM    Please note that portions of this note may have been completed with a voice recognition program. Efforts were made to edit the dictations, but occasionally words are mistranscribed.

## 2019-07-09 ENCOUNTER — OFFICE VISIT (OUTPATIENT)
Dept: FAMILY MEDICINE CLINIC | Facility: CLINIC | Age: 29
End: 2019-07-09

## 2019-07-09 VITALS
WEIGHT: 227 LBS | RESPIRATION RATE: 14 BRPM | HEIGHT: 66 IN | DIASTOLIC BLOOD PRESSURE: 72 MMHG | HEART RATE: 99 BPM | BODY MASS INDEX: 36.48 KG/M2 | SYSTOLIC BLOOD PRESSURE: 124 MMHG | OXYGEN SATURATION: 99 %

## 2019-07-09 DIAGNOSIS — Z87.39 HISTORY OF CLOSED SHOULDER DISLOCATION: ICD-10-CM

## 2019-07-09 DIAGNOSIS — S43.001S SHOULDER SUBLUXATION, RIGHT, SEQUELA: ICD-10-CM

## 2019-07-09 DIAGNOSIS — M25.511 ARTHRALGIA OF RIGHT SHOULDER REGION: Primary | ICD-10-CM

## 2019-07-09 PROCEDURE — 99213 OFFICE O/P EST LOW 20 MIN: CPT | Performed by: FAMILY MEDICINE

## 2019-07-09 NOTE — PROGRESS NOTES
Established Patient        Chief Complaint:   Chief Complaint   Patient presents with   • Shoulder Pain     Follow up on shoulder pan, Diclofenac seems to help some        Becky Jose Martin Garcia is a 28 y.o. female    History of Present Illness:   Presents for scheduled follow-up visit concerning her right shoulder arthralgia.  Patient states that the anti-inflammatory medication is provided some meaningful improvement to her symptoms.  She still has some difficulty and hesitation for certain positions and activities due to fear of dislocation.  She denies any new injuries.    Subjective     The following portions of the patient's history were reviewed and updated as appropriate: allergies, current medications, past family history, past medical history, past social history, past surgical history and problem list.    Allergies   Allergen Reactions   • Zithromax [Azithromycin] Anaphylaxis       Review of Systems  1. Constitutional: Negative for fever. Negative for chills, diaphoresis, fatigue and unexpected weight change.   2. HENT: No dysphagia; no changes to vision/hearing/smell/taste; no epistaxis  3. Eyes: Negative for redness and visual disturbance.   4. Respiratory: negative for shortness of breath. Negative for chest pain . Negative for cough and chest tightness.   5. Cardiovascular: Negative for chest pain and palpitations.   6. Gastrointestinal: Negative for abdominal distention, abdominal pain and blood in stool.   7. Endocrine: Negative for cold intolerance and heat intolerance.   8. Genitourinary: Negative for difficulty urinating, dysuria and frequency.   9. Musculoskeletal: Right shoulder pain as per above.  10. Skin: Negative for color change, rash and wound.   11. Neurological: Negative for syncope, weakness and headaches.   12. Hematological: Negative for adenopathy. Does not bruise/bleed easily.   13. Psychiatric/Behavioral: Negative for confusion. The patient is not nervous/anxious.    Objective  "    Physical Exam   Vital Signs: /72   Pulse 99   Resp 14   Ht 167.6 cm (66\")   Wt 103 kg (227 lb)   SpO2 99%   BMI 36.64 kg/m²     General Appearance: alert, oriented x 3, no acute distress.  Skin: warm and dry.   HEENT: Atraumatic.  pupils round and reactive to light and accommodation, oral mucosa pink and moist.  Nares patent without epistaxis.  External auditory canals are patent tympanic membranes intact.  Neck: supple, no JVD, trachea midline.  No thyromegaly  Lungs: CTA, unlabored breathing effort.  Heart: RRR, normal S1 and S2, no S3, no rub.  Abdomen: soft, non-tender, no palpable bladder, present bowel sounds to auscultation ×4.  No guarding or rigidity.  Extremities: no clubbing, cyanosis or edema.  Normal tone and development to bilateral deltoids.  Decreased range of motion to the right shoulder, empty can and Cristina test positive for discomfort.  Pain on palpation along the supraspinatus tendon passively.  Lateral subacromial space tenderness on palpation.  Negative shrug sign, push off is normal.   strength symmetric bilaterally.  Intact supination and pronation of bilateral forearms.  Apprehension sign negative.  Larslan's negative.  Without findings of winging of the scapula.  Neuro: normal speech and mental status.  Cranial nerves II through XII intact.  No anosmia. DTR 2+; proprioception intact.  No focal motor/sensory deficits.    Assessment and Plan      Assessment:   Becky was seen today for shoulder pain.    Diagnoses and all orders for this visit:    Arthralgia of right shoulder region    History of closed shoulder dislocation    Shoulder subluxation, right, sequela        Plan:  Continue use of NSAID as long as beneficial; may try a period of prn use and she how she does.    Continue A/P ROM to affected shoulder.'    She remains off work at this time; certainly qualifies for short term disability given her Right arm dominance, and effects of impaired ADLs, including work " obligations.  I recommended that she continue to remain off work until formal physical therapy evaluation is completed, as well as demonstrating progressive improvement to her conditioning of her multiplanar instability of the right shoulder.    Has not started PT yet, still awaiting scheduling.    Discussion Summary:    Discussed plan of care in detail with pt today; pt verb understanding and agrees; counseled for approx 10 min of total 15 min exam time.  Follow up:  Return in about 3 months (around 10/9/2019) for Recheck.     There are no Patient Instructions on file for this visit.    Reuben Mercado, DO  07/09/19  11:04 AM    Please note that portions of this note may have been completed with a voice recognition program. Efforts were made to edit the dictations, but occasionally words are mistranscribed.

## 2019-07-17 ENCOUNTER — TREATMENT (OUTPATIENT)
Dept: PHYSICAL THERAPY | Facility: CLINIC | Age: 29
End: 2019-07-17

## 2019-07-17 DIAGNOSIS — G89.29 CHRONIC RIGHT SHOULDER PAIN: Primary | ICD-10-CM

## 2019-07-17 DIAGNOSIS — M25.511 CHRONIC RIGHT SHOULDER PAIN: Primary | ICD-10-CM

## 2019-07-17 PROCEDURE — 97162 PT EVAL MOD COMPLEX 30 MIN: CPT | Performed by: PHYSICAL THERAPIST

## 2019-07-17 PROCEDURE — 97110 THERAPEUTIC EXERCISES: CPT | Performed by: PHYSICAL THERAPIST

## 2019-07-17 NOTE — PROGRESS NOTES
Physical Therapy Initial Evaluation and Plan of Care      Patient: Becky Garcia   : 1990  Diagnosis/ICD-10 Code:  Chronic right shoulder pain [M25.511, G89.29]  Referring practitioner: Reuben Mercado DO    Subjective Evaluation    History of Present Illness  Mechanism of injury: Patient states for over a year her right shoulder has been painful and weak. States pain has been on and off. Had a shoulder dislocation when having to help pull her mother out of bed. Has done physical therapy but only a week at a time and she states they would release her.     Non painful popping when raise arm too high.     Pain worse in morning. Hand feels weak.     Off work currently.       Patient Occupation: [employer]  Current pain ratin  At best pain rating: 3  At worst pain ratin  Location: Dougie NT, right post and sup shoulder, right cervical.   Quality: dull ache and sharp  Aggravating factors: lifting, movement, sleeping, outstretched reach and overhead activity    Hand dominance: right    Treatments  Previous treatment: physical therapy  Patient Goals  Patient goals for therapy: decreased edema, decreased pain, increased motion, return to sport/leisure activities, independence with ADLs/IADLs, increased strength and return to work  Patient goal: caretaker to mother, housework            Objective       Postural Observations  Seated posture: poor  Standing posture: poor    Additional Postural Observation Details  Mod-severely rounded shoulders     Palpation     Right   Hypertonic in the supraspinatus and upper trapezius. Tenderness of the middle trapezius, supraspinatus and upper trapezius.     Tenderness     Right Shoulder  Tenderness in the AC joint, medial scapula, scapular spine and supraspinatus tendon.     Cervical/Thoracic Screen   Cervical range of motion within normal limits    Neurological Testing     Sensation     Shoulder   Left Shoulder   Intact: light touch    Right Shoulder    Intact: light touch    Active Range of Motion     Right Shoulder   Flexion: 70 degrees with pain  Abduction: 115 degrees with pain  External rotation 45°: 57 degrees with pain  Internal rotation 45°: WFL    Passive Range of Motion     Right Shoulder   Flexion: 110 degrees with pain  Abduction: 120 degrees with pain    Scapular Mobility     Right Shoulder   Scapular Mobility with Shoulder to 90° FF   Scapular elevation: moderate  Downward rotation: delayed    Joint Play     Right Shoulder  Hypomobile in the posterior capsule and inferior capsule.     Strength/Myotome Testing     Right Shoulder     Planes of Motion   Flexion: 4-   Extension: 4-   Abduction: 4-   External rotation at 0°: 4-   Internal rotation at 0°: 4-     Left Wrist/Hand      (2nd hand position)     Trial 1: 47.4 lbs    Right Wrist/Hand      (2nd hand position)     Trial 1: 32.7 lbs    Tests     Right Shoulder   Positive empty can, full can and Hawkin's.          Assessment & Plan     Assessment  Impairments: abnormal coordination, abnormal muscle firing, abnormal muscle tone, abnormal or restricted ROM, activity intolerance, impaired physical strength, lacks appropriate home exercise program and pain with function  Assessment details: Patient is a 28 year old female presenting with chronic right shoulder pain and weakness. Patient presents with signs and symptoms consistent with supraspinatus tendinopathy and impingement including decreased shoulder mobility secondary to pain, poor posture, history of dislocation, hypomobility of glenohumeral joint, and difficulty lifting and carrying objects. Cannot rule out strain or tear at this time. No neurologic deficits noted. Patient is appropriate for physical therapy to address the above issues.   Prognosis: good  Prognosis details: Short Term Goals (3 weeks):  1. Patient will be independent with home exercise program.  2. Patient will demonstrate improved shoulder strength by 50%.  3. Patient  will demonstrate improved shoulder mobility by 50%     Long Term Goals (8 weeks):  1. Patient will be able to lift at least 5 lbs overhead with soulder pain no greater than 2/10.   2. Patient will demonstrate shoulder mobility of WFL.   3. Patient will be able to return to full work duty with shoulder pain no greater than 2/10.      Functional Limitations: carrying objects, lifting, sleeping, pulling, pushing, uncomfortable because of pain and reaching overhead  Plan  Therapy options: will be seen for skilled physical therapy services  Planned modality interventions: ultrasound, traction, thermotherapy (hydrocollator packs), TENS, iontophoresis, high voltage pulsed current (spasm management), high voltage pulsed current (pain management), cryotherapy and contrast bath immersion  Planned therapy interventions: therapeutic activities, stretching, strengthening, spinal/joint mobilization, soft tissue mobilization, postural training, neuromuscular re-education, motor coordination training, manual therapy, abdominal trunk stabilization, ADL retraining, balance/weight-bearing training, body mechanics training, fine motor coordination training, flexibility, functional ROM exercises, home exercise program, IADL retraining and joint mobilization  Frequency: 2-3x/week.  Duration in visits: 16  Treatment plan discussed with: patient        Manual Therapy:         mins  77845;  Therapeutic Exercise:    36     mins  22076;     Neuromuscular Melecio:        mins  39182;    Therapeutic Activity:          mins  80734;     Gait Training:           mins  36132;     Ultrasound:          mins  97462;    Electrical Stimulation:         mins  29960 ( );  Dry Needling          mins self-pay    Timed Treatment:  36    mins   Total Treatment:     56   mins    PT SIGNATURE: Lisa Orlando PT   DATE TREATMENT INITIATED: 7/18/2019    Initial Certification  Certification Period: 10/16/2019  I certify that the therapy services are furnished  while this patient is under my care.  The services outlined above are required by this patient, and will be reviewed every 90 days.     PHYSICIAN: Reuben Mercado, DO      DATE:     Please sign and return via fax to [phone].. Thank you, [facility].

## 2019-07-19 ENCOUNTER — TREATMENT (OUTPATIENT)
Dept: PHYSICAL THERAPY | Facility: CLINIC | Age: 29
End: 2019-07-19

## 2019-07-19 DIAGNOSIS — M25.511 CHRONIC RIGHT SHOULDER PAIN: Primary | ICD-10-CM

## 2019-07-19 DIAGNOSIS — G89.29 CHRONIC RIGHT SHOULDER PAIN: Primary | ICD-10-CM

## 2019-07-19 PROCEDURE — 97110 THERAPEUTIC EXERCISES: CPT | Performed by: PHYSICAL THERAPIST

## 2019-07-19 NOTE — PROGRESS NOTES
Subjective   Becky Garcia reports: 6-7/10 pain at rest. Had a lot of pain following first session. Cane flexion felt like shoulder was going to pop out.     Objective   See Exercise, Manual, and Modality Logs for complete treatment.       Assessment/Plan  Will defer cane exercises. Will focus on shoulder and scapular strengthening and stability.   Progress per Plan of Care           Manual Therapy:         mins  17784;  Therapeutic Exercise:    53     mins  86959;     Neuromuscular Melecio:        mins  18697;    Therapeutic Activity:          mins  44678;     Gait Training:           mins  31941;     Ultrasound:          mins  33236;   Iontophoresis          mins  63175   Electrical Stimulation:         mins  92331 ( );  Dry Needling         mins self-pay  Fluidotherapy          mins 50168    Timed Treatment:   53   mins   Total Treatment:     63   mins    Lisa Orlando PT  Physical Therapist

## 2019-07-22 ENCOUNTER — TREATMENT (OUTPATIENT)
Dept: PHYSICAL THERAPY | Facility: CLINIC | Age: 29
End: 2019-07-22

## 2019-07-22 DIAGNOSIS — M25.511 CHRONIC RIGHT SHOULDER PAIN: Primary | ICD-10-CM

## 2019-07-22 DIAGNOSIS — G89.29 CHRONIC RIGHT SHOULDER PAIN: Primary | ICD-10-CM

## 2019-07-22 PROCEDURE — 97110 THERAPEUTIC EXERCISES: CPT | Performed by: PHYSICAL THERAPIST

## 2019-07-22 NOTE — PROGRESS NOTES
Subjective   Becky Garcia reports: No pain at rest. Shoulder pain has been much better over the weekend.     Objective   Improved scapular retraction, cues for relaxing upper shoulders     See Exercise, Manual, and Modality Logs for complete treatment.       Assessment/Plan  Patient demonstrates difficulty relaxing upper shoulders. Will continue working on shoulder stability.   Progress per Plan of Care           Manual Therapy:         mins  57700;  Therapeutic Exercise:    54     mins  37414;     Neuromuscular Melecio:        mins  01776;    Therapeutic Activity:          mins  48886;     Gait Training:           mins  34395;     Ultrasound:          mins  46440;   Iontophoresis          mins  53275   Electrical Stimulation:         mins  46683 ( );  Dry Needling          mins self-pay  Fluidotherapy          mins 96776    Timed Treatment:   54   mins   Total Treatment:     64   mins    Lisa Orlando PT  Physical Therapist

## 2019-07-25 ENCOUNTER — TREATMENT (OUTPATIENT)
Dept: PHYSICAL THERAPY | Facility: CLINIC | Age: 29
End: 2019-07-25

## 2019-07-25 DIAGNOSIS — G89.29 CHRONIC RIGHT SHOULDER PAIN: Primary | ICD-10-CM

## 2019-07-25 DIAGNOSIS — M25.511 CHRONIC RIGHT SHOULDER PAIN: Primary | ICD-10-CM

## 2019-07-25 PROCEDURE — 97110 THERAPEUTIC EXERCISES: CPT | Performed by: PHYSICAL THERAPIST

## 2019-07-25 NOTE — PROGRESS NOTES
Subjective   Becky Garcia reports: No pain at rest. States she can tell a difference in her shoulder blade when she lays down, it lays flat now.     Objective       Active Range of Motion     Right Shoulder   Flexion: 140 degrees   Abduction: 110 degrees      R   50 lbs   L    41 lbs     Scapular control much improved.     See Exercise, Manual, and Modality Logs for complete treatment.       Assessment/Plan  Patient demonstrates improvement with shoulder mobility and  strength. Still having recruitment from cervical paraspinals. Will continue to work on shoulder strengthening and scapular stability.   Progress per Plan of Care           Manual Therapy:         mins  44235;  Therapeutic Exercise:    53     mins  30708;     Neuromuscular Melecio:        mins  45249;    Therapeutic Activity:          mins  68333;     Gait Training:           mins  51350;     Ultrasound:          mins  22688;   Iontophoresis          mins  06940   Electrical Stimulation:         mins  70311 ( );  Dry Needling          mins self-pay  Fluidotherapy          mins 66726    Timed Treatment:   53   mins   Total Treatment:     63   mins    Lisa Orlando PT  Physical Therapist

## 2019-07-30 ENCOUNTER — TREATMENT (OUTPATIENT)
Dept: PHYSICAL THERAPY | Facility: CLINIC | Age: 29
End: 2019-07-30

## 2019-07-30 DIAGNOSIS — M25.511 CHRONIC RIGHT SHOULDER PAIN: Primary | ICD-10-CM

## 2019-07-30 DIAGNOSIS — G89.29 CHRONIC RIGHT SHOULDER PAIN: Primary | ICD-10-CM

## 2019-07-30 PROCEDURE — 97110 THERAPEUTIC EXERCISES: CPT | Performed by: PHYSICAL THERAPIST

## 2019-07-30 NOTE — PROGRESS NOTES
Subjective   Becky Garcia reports: 3/10 pain at rest.     Objective   Decreased strength in weightbearing on right side compared to left without pain.     See Exercise, Manual, and Modality Logs for complete treatment.       Assessment/Plan  Patient progressing well with strengthening. Weakness noted with weightbearing.   Progress per Plan of Care           Manual Therapy:         mins  18975;  Therapeutic Exercise:    55     mins  00414;     Neuromuscular Melecio:        mins  40368;    Therapeutic Activity:          mins  35285;     Gait Training:           mins  10954;     Ultrasound:          mins  78946;   Iontophoresis          mins  65858   Electrical Stimulation:         mins  46742 ( );  Dry Needling         mins self-pay  Fluidotherapy          mins 57715    Timed Treatment:   55   mins   Total Treatment:     55   mins    Lisa Orlando PT  Physical Therapist

## 2019-08-01 ENCOUNTER — TREATMENT (OUTPATIENT)
Dept: PHYSICAL THERAPY | Facility: CLINIC | Age: 29
End: 2019-08-01

## 2019-08-01 DIAGNOSIS — M25.511 CHRONIC RIGHT SHOULDER PAIN: Primary | ICD-10-CM

## 2019-08-01 DIAGNOSIS — G89.29 CHRONIC RIGHT SHOULDER PAIN: Primary | ICD-10-CM

## 2019-08-01 PROCEDURE — 97110 THERAPEUTIC EXERCISES: CPT | Performed by: PHYSICAL THERAPIST

## 2019-08-01 NOTE — PROGRESS NOTES
Subjective   Becky Garcia reports: 2/10 pain at rest.     Objective   See Exercise, Manual, and Modality Logs for complete treatment.       Assessment/Plan  Patient continuing to progress. Will continue progressing shoulder stabilization.   Progress strengthening /stabilization /functional activity           Manual Therapy:         mins  40862;  Therapeutic Exercise:    54     mins  53240;     Neuromuscular Melecio:        mins  24929;    Therapeutic Activity:          mins  89780;     Gait Training:           mins  19284;     Ultrasound:          mins  78141;   Iontophoresis          mins  54964   Electrical Stimulation:         mins  32653 ( );  Dry Needling          mins self-pay  Fluidotherapy          mins 11688    Timed Treatment:   54   mins   Total Treatment:     54   mins    Lisa Orlando, PT  Physical Therapist

## 2019-08-06 ENCOUNTER — TREATMENT (OUTPATIENT)
Dept: PHYSICAL THERAPY | Facility: CLINIC | Age: 29
End: 2019-08-06

## 2019-08-06 PROCEDURE — 97110 THERAPEUTIC EXERCISES: CPT | Performed by: PHYSICAL THERAPIST

## 2019-08-06 NOTE — PROGRESS NOTES
Subjective   Becky Garcia reports: 3/10 soreness in shoulder. Most pain is in the morning, states she is a very restless sleeper normally and always ends up on the shoulder.     Objective   Improved symptoms with stabilization exercises.      See Exercise, Manual, and Modality Logs for complete treatment.       Assessment/Plan  Patient demonstrates improvement with weights overhead. Tolerated weight bearing well. Will try overhead presses next visit.   Progress per Plan of Care           Manual Therapy:         mins  90467;  Therapeutic Exercise:    54     mins  50221;     Neuromuscular Melecio:        mins  77759;    Therapeutic Activity:          mins  80102;     Gait Training:           mins  50456;     Ultrasound:          mins  10680;   Iontophoresis          mins  23401   Electrical Stimulation:         mins  38696 ( );  Dry Needling          mins self-pay  Fluidotherapy          mins 36341    Timed Treatment:   54   mins   Total Treatment:     54   mins    Lisa Orlando PT  Physical Therapist

## 2019-08-08 ENCOUNTER — TREATMENT (OUTPATIENT)
Dept: PHYSICAL THERAPY | Facility: CLINIC | Age: 29
End: 2019-08-08

## 2019-08-08 PROCEDURE — 97110 THERAPEUTIC EXERCISES: CPT | Performed by: PHYSICAL THERAPIST

## 2019-08-08 NOTE — PROGRESS NOTES
Subjective   Becky Garcia reports: Still having arm pain at night, intensity has been less but still has to use ice or Advil pm.     Objective   See Exercise, Manual, and Modality Logs for complete treatment.       Assessment/Plan  Trial of thoracic roll at night. Patient has been progressing well with shoulder stability exercises. Can now hold weight overhead for several minutes without increased pain.   Progress per Plan of Care           Manual Therapy:         mins  17218;  Therapeutic Exercise:    55     mins  13713;     Neuromuscular Melecio:        mins  22937;    Therapeutic Activity:          mins  86860;     Gait Training:           mins  19084;     Ultrasound:          mins  23384;   Iontophoresis          mins  69075   Electrical Stimulation:         mins  70787 ( );  Dry Needling          mins self-pay  Fluidotherapy          mins 33574    Timed Treatment:   55   mins   Total Treatment:     55   mins    Lisa Orlando, PT  Physical Therapist

## 2019-08-13 ENCOUNTER — TREATMENT (OUTPATIENT)
Dept: PHYSICAL THERAPY | Facility: CLINIC | Age: 29
End: 2019-08-13

## 2019-08-13 DIAGNOSIS — M25.511 CHRONIC RIGHT SHOULDER PAIN: Primary | ICD-10-CM

## 2019-08-13 DIAGNOSIS — G89.29 CHRONIC RIGHT SHOULDER PAIN: Primary | ICD-10-CM

## 2019-08-13 PROCEDURE — 97110 THERAPEUTIC EXERCISES: CPT | Performed by: PHYSICAL THERAPIST

## 2019-08-13 NOTE — PROGRESS NOTES
Subjective   Becky Garcia reports: 3/10 shoulder pain. Has been having more popping in shoulder, non painful. Able to put hand behind head without pain or feeling like shoulder will come out, does feel tighter on right side than let.     Objective   See Exercise, Manual, and Modality Logs for complete treatment.       Assessment/Plan  Patient had increased shoulder fatigue with progressed exercises.   Progress per Plan of Care           Manual Therapy:         mins  57613;  Therapeutic Exercise:    54     mins  95866;     Neuromuscular Melecio:        mins  56219;    Therapeutic Activity:          mins  85221;     Gait Training:           mins  90133;     Ultrasound:          mins  85989;   Iontophoresis          mins  21458   Electrical Stimulation:         mins  67782 ( );  Dry Needling          mins self-pay  Fluidotherapy          mins 79604    Timed Treatment:   54   mins   Total Treatment:     54   mins    Lisa Orlando PT  Physical Therapist

## 2019-08-15 ENCOUNTER — TREATMENT (OUTPATIENT)
Dept: PHYSICAL THERAPY | Facility: CLINIC | Age: 29
End: 2019-08-15

## 2019-08-15 DIAGNOSIS — G89.29 CHRONIC RIGHT SHOULDER PAIN: Primary | ICD-10-CM

## 2019-08-15 DIAGNOSIS — M25.511 CHRONIC RIGHT SHOULDER PAIN: Primary | ICD-10-CM

## 2019-08-15 PROCEDURE — 97140 MANUAL THERAPY 1/> REGIONS: CPT | Performed by: PHYSICAL THERAPIST

## 2019-08-15 PROCEDURE — 97110 THERAPEUTIC EXERCISES: CPT | Performed by: PHYSICAL THERAPIST

## 2019-08-15 NOTE — PROGRESS NOTES
Subjective   Becky Garcia reports: 5/10 shoulder pain at rest. Pain focused on upper superior shoulder. Thoracic roll feels like it stretches her upper back and she's been able to lay on it for about 10-15 minutes at night before she rolls over to sleep.     Objective   R shoulder AROM:  Flexion: 150 deg with tightness  Abduction: 123 deg with tightness and mild pinching in upper shoulder (supraspinatus)    Pain and hypertonicity along medial border of right scapula.     See Exercise, Manual, and Modality Logs for complete treatment.       Assessment/Plan  Patient demonstrates improved shoulder active mobility, still having pain at end range. Increased tone with tenderness along medial scapular border.   Progress per Plan of Care           Manual Therapy:    8     mins  01722;  Therapeutic Exercise:    48     mins  40255;     Neuromuscular Melecio:        mins  45869;    Therapeutic Activity:          mins  60880;     Gait Training:           mins  52224;     Ultrasound:          mins  11805;   Iontophoresis          mins  29120   Electrical Stimulation:         mins  69400 ( );  Dry Needling          mins self-pay  Fluidotherapy          mins 58953    Timed Treatment:   56   mins   Total Treatment:     66   mins    Lisa Orlando, PT  Physical Therapist

## 2019-08-20 ENCOUNTER — TREATMENT (OUTPATIENT)
Dept: PHYSICAL THERAPY | Facility: CLINIC | Age: 29
End: 2019-08-20

## 2019-08-20 DIAGNOSIS — G89.29 CHRONIC RIGHT SHOULDER PAIN: Primary | ICD-10-CM

## 2019-08-20 DIAGNOSIS — M25.511 CHRONIC RIGHT SHOULDER PAIN: Primary | ICD-10-CM

## 2019-08-20 PROCEDURE — 97110 THERAPEUTIC EXERCISES: CPT | Performed by: PHYSICAL THERAPIST

## 2019-08-20 PROCEDURE — 97140 MANUAL THERAPY 1/> REGIONS: CPT | Performed by: PHYSICAL THERAPIST

## 2019-08-20 NOTE — PROGRESS NOTES
Subjective   Beckyyuridia Garcia reports: 3/10 pain at rest. States shoulder has been better over weekend, has been doing a lot of stretches.     Objective     See Exercise, Manual, and Modality Logs for complete treatment.       Assessment/Plan  Pt reported feeling more mobile and loose after manual STM and cervical glides but pain did not change.   Progress per Plan of Care           Manual Therapy:    13     mins  36921;  Therapeutic Exercise:    42     mins  87584;     Neuromuscular Melecio:        mins  65341;    Therapeutic Activity:          mins  72840;     Gait Training:           mins  15506;     Ultrasound:          mins  78761;   Iontophoresis          mins  73912   Electrical Stimulation:         mins  73904 ( );  Dry Needling          mins self-pay  Fluidotherapy          mins 20964    Timed Treatment:  55    mins   Total Treatment:     65   mins    Lisa Orlando, PT  Physical Therapist

## 2019-08-27 ENCOUNTER — TREATMENT (OUTPATIENT)
Dept: PHYSICAL THERAPY | Facility: CLINIC | Age: 29
End: 2019-08-27

## 2019-08-27 DIAGNOSIS — M25.511 CHRONIC RIGHT SHOULDER PAIN: Primary | ICD-10-CM

## 2019-08-27 DIAGNOSIS — G89.29 CHRONIC RIGHT SHOULDER PAIN: Primary | ICD-10-CM

## 2019-08-27 PROCEDURE — 97110 THERAPEUTIC EXERCISES: CPT | Performed by: PHYSICAL THERAPIST

## 2019-08-27 PROCEDURE — 97140 MANUAL THERAPY 1/> REGIONS: CPT | Performed by: PHYSICAL THERAPIST

## 2019-08-27 PROCEDURE — 97112 NEUROMUSCULAR REEDUCATION: CPT | Performed by: PHYSICAL THERAPIST

## 2019-08-27 NOTE — PROGRESS NOTES
Physical Therapy Daily Progress Note        Becky Garcia reports 5/10 pain today at rest.  Pt reports that she feels her shoulder blade on the R side is better positioned and that her shoulder and arm have more strength. Pt reports that she has been having a lot of pain in the R lats and periscapular muscles.         Objective Pt present to PT today with no distress at rest.     Pt tolerated exercises well today with no increased pain in the R shoulder following activities.       See Exercise, Manual, and Modality Logs for complete treatment.     Assessment/Plan  Pt continues to have severe tenderness in the R lat and periscapular muscles. Pt able to concentrate on RC muscle strengthening some today without increased pain. Pt would benefit from PT to help increase shoulder stability, strength, and function.       Progress per Plan of Care  Progress as tolerated           Manual Therapy:    10     mins  20162;  Therapeutic Exercise:    26     mins  57346;     Neuromuscular Melecio:    14    mins  70782;    Therapeutic Activity:          mins  05104;     Gait Training:        ___  mins  23846;     Ultrasound:          mins  70048;    Electrical Stimulation:         mins  40678 ( );  Dry Needling          mins self-pay    Timed Treatment:   50   mins   Total Treatment:     60   mins    Mitul Cope, PT  Physical Therapist

## 2019-08-29 ENCOUNTER — TREATMENT (OUTPATIENT)
Dept: PHYSICAL THERAPY | Facility: CLINIC | Age: 29
End: 2019-08-29

## 2019-08-29 DIAGNOSIS — G89.29 CHRONIC RIGHT SHOULDER PAIN: Primary | ICD-10-CM

## 2019-08-29 DIAGNOSIS — M25.511 CHRONIC RIGHT SHOULDER PAIN: Primary | ICD-10-CM

## 2019-08-29 PROCEDURE — 97112 NEUROMUSCULAR REEDUCATION: CPT | Performed by: PHYSICAL THERAPIST

## 2019-08-29 PROCEDURE — 97140 MANUAL THERAPY 1/> REGIONS: CPT | Performed by: PHYSICAL THERAPIST

## 2019-08-29 PROCEDURE — 97110 THERAPEUTIC EXERCISES: CPT | Performed by: PHYSICAL THERAPIST

## 2019-08-29 NOTE — PROGRESS NOTES
Physical Therapy Daily Progress Note        Becky Garcia reports 4/10 pain today at rest.  Pt reports that she is having a little less pain than last week although most of her pain is in the R side of her neck. Pt reports that her scapular muscles are still very sore.         Objective Pt present to PT today with no distress at rest.     Pt tolerated exercises and cervical manual therapy well today with less pain in the cervical spine.       See Exercise, Manual, and Modality Logs for complete treatment.     Assessment/Plan  Pt continues to have a lot of tenderness in the L shoulder, periscapular area, and R cervical spine. Pt would benefit from PT to help improve posture, shoulder strength, and activity tolerance in order to return to pain free daily activities.       Progress per Plan of Care  Progress as tolerated           Manual Therapy:    16     mins  22577;  Therapeutic Exercise:    17     mins  37444;     Neuromuscular Melecio:    12    mins  30651;    Therapeutic Activity:          mins  29834;     Gait Training:        ___  mins  07665;     Ultrasound:          mins  56064;    Electrical Stimulation:         mins  42545 ( );  Dry Needling          mins self-pay    Timed Treatment:   45   mins   Total Treatment:     58   mins    Mitul Cope, PT  Physical Therapist

## 2019-09-05 ENCOUNTER — OFFICE VISIT (OUTPATIENT)
Dept: PHYSICAL THERAPY | Facility: CLINIC | Age: 29
End: 2019-09-05

## 2019-09-05 DIAGNOSIS — G89.29 CHRONIC RIGHT SHOULDER PAIN: Primary | ICD-10-CM

## 2019-09-05 DIAGNOSIS — M25.511 CHRONIC RIGHT SHOULDER PAIN: Primary | ICD-10-CM

## 2019-09-05 PROCEDURE — 97112 NEUROMUSCULAR REEDUCATION: CPT | Performed by: PHYSICAL THERAPIST

## 2019-09-05 PROCEDURE — 97140 MANUAL THERAPY 1/> REGIONS: CPT | Performed by: PHYSICAL THERAPIST

## 2019-09-10 ENCOUNTER — OFFICE VISIT (OUTPATIENT)
Dept: PHYSICAL THERAPY | Facility: CLINIC | Age: 29
End: 2019-09-10

## 2019-09-10 DIAGNOSIS — G89.29 CHRONIC RIGHT SHOULDER PAIN: Primary | ICD-10-CM

## 2019-09-10 DIAGNOSIS — M25.511 CHRONIC RIGHT SHOULDER PAIN: Primary | ICD-10-CM

## 2019-09-10 PROCEDURE — 97140 MANUAL THERAPY 1/> REGIONS: CPT | Performed by: PHYSICAL THERAPIST

## 2019-09-10 PROCEDURE — 97112 NEUROMUSCULAR REEDUCATION: CPT | Performed by: PHYSICAL THERAPIST

## 2019-09-10 NOTE — PROGRESS NOTES
Physical Therapy Daily Progress Note        Becky Garcia reports 2/10 pain today at rest.  Pt reports that she really has not had much pain recently in the last week or two. Pt states that her shoulder feels stronger and is watching how she is holding her shoulder.         Objective Pt present to PT today with no distress at rest.     Pt tolerated exercises well today without increased pain in the shoulder or neck.       See Exercise, Manual, and Modality Logs for complete treatment.     Assessment/Plan  Pt continues to progress well with stabilization and strengthening exercises for her R shoulder. Pt would benefit from continued PT to improve R shoulder pain, strength, and activity tolerance to increase pain free shoulder function.       Progress per Plan of Care  Progress as tolerated           Manual Therapy:    12     mins  03581;  Therapeutic Exercise:         mins  82771;     Neuromuscular Melecio:    18    mins  84007;    Therapeutic Activity:          mins  84218;     Gait Training:        ___  mins  99654;     Ultrasound:          mins  70438;    Electrical Stimulation:         mins  37171 ( );  Dry Needling          mins self-pay    Timed Treatment:   30   mins   Total Treatment:     66   mins    Mitul Cope, PT  Physical Therapist

## 2019-09-12 ENCOUNTER — OFFICE VISIT (OUTPATIENT)
Dept: PHYSICAL THERAPY | Facility: CLINIC | Age: 29
End: 2019-09-12

## 2019-09-12 DIAGNOSIS — G89.29 CHRONIC RIGHT SHOULDER PAIN: Primary | ICD-10-CM

## 2019-09-12 DIAGNOSIS — M25.511 CHRONIC RIGHT SHOULDER PAIN: Primary | ICD-10-CM

## 2019-09-12 PROCEDURE — 97110 THERAPEUTIC EXERCISES: CPT | Performed by: PHYSICAL THERAPIST

## 2019-09-12 PROCEDURE — 97112 NEUROMUSCULAR REEDUCATION: CPT | Performed by: PHYSICAL THERAPIST

## 2019-09-12 PROCEDURE — 97140 MANUAL THERAPY 1/> REGIONS: CPT | Performed by: PHYSICAL THERAPIST

## 2019-09-12 NOTE — PROGRESS NOTES
Physical Therapy Daily Progress Note        Becky Garcia reports 2/10 pain today at rest.  Pt reports that she has been having less pain in the R shoulder and that her UT and levator scap on the R side are less tender to touch.         Objective Pt present to PT today with no distress at rest.     Pt tolerated exercises well today without increased pain in the neck or shoulder.       See Exercise, Manual, and Modality Logs for complete treatment.     Assessment/Plan  Pt continues to respond well to scapular muscle strengthening and shoulder stability exercises. Pt to progress shoulder stability activities as tolerated. Pt would benefit from PT to help increase shoulder activity tolerance, stability, and strength to return to pain free function.       Progress per Plan of Care  Progress as tolerated           Manual Therapy:    14     mins  35012;  Therapeutic Exercise:    26     mins  58349;     Neuromuscular Melecio:    12    mins  57593;    Therapeutic Activity:          mins  44698;     Gait Training:        ___  mins  67168;     Ultrasound:          mins  86062;    Electrical Stimulation:         mins  97001 ( );  Dry Needling          mins self-pay    Timed Treatment:   52   mins   Total Treatment:     56   mins    Mitul Cope, PT  Physical Therapist

## 2019-09-17 ENCOUNTER — OFFICE VISIT (OUTPATIENT)
Dept: PHYSICAL THERAPY | Facility: CLINIC | Age: 29
End: 2019-09-17

## 2019-09-17 DIAGNOSIS — G89.29 CHRONIC RIGHT SHOULDER PAIN: Primary | ICD-10-CM

## 2019-09-17 DIAGNOSIS — M25.511 CHRONIC RIGHT SHOULDER PAIN: Primary | ICD-10-CM

## 2019-09-17 PROCEDURE — 97110 THERAPEUTIC EXERCISES: CPT | Performed by: PHYSICAL THERAPIST

## 2019-09-17 PROCEDURE — 97112 NEUROMUSCULAR REEDUCATION: CPT | Performed by: PHYSICAL THERAPIST

## 2019-09-17 PROCEDURE — 97140 MANUAL THERAPY 1/> REGIONS: CPT | Performed by: PHYSICAL THERAPIST

## 2019-09-17 NOTE — PROGRESS NOTES
Physical Therapy Daily Progress Note        Becky Garcia reports 4/10 pain today at rest.  Pt reports that her shoulder started hurting more yesterday despite not doing anything out of the ordinary. Pt reports that she is frustrated because the pain and weakness seems to rotate between her shoulder and neck. Pt states that she has done pretty well for the last couple of weeks.         Objective Pt present to PT today with no distress at rest.     Pt tolerated exercises well today with no increased pain or irritation in the R shoulder after session.       See Exercise, Manual, and Modality Logs for complete treatment.     Assessment/Plan  Pt had some increased pain today due to flare up in the shoulder. Pt has increased pain in the UT towards the R shoulder and tenderness through the R deltoid. Pt would benefit from PT to help improve shoulder strength, stability, and activity tolerance.       Progress per Plan of Care  Progress as tolerated           Manual Therapy:    13     mins  67804;  Therapeutic Exercise:    25     mins  75988;     Neuromuscular Melecio:    15    mins  43014;    Therapeutic Activity:          mins  61044;     Gait Training:        ___  mins  88717;     Ultrasound:          mins  24281;    Electrical Stimulation:         mins  43059 ( );  Dry Needling          mins self-pay    Timed Treatment:   53   mins   Total Treatment:     54   mins    Mitul Cope, PT  Physical Therapist

## 2019-09-19 ENCOUNTER — TREATMENT (OUTPATIENT)
Dept: PHYSICAL THERAPY | Facility: CLINIC | Age: 29
End: 2019-09-19

## 2019-09-19 DIAGNOSIS — M25.511 CHRONIC RIGHT SHOULDER PAIN: Primary | ICD-10-CM

## 2019-09-19 DIAGNOSIS — G89.29 CHRONIC RIGHT SHOULDER PAIN: Primary | ICD-10-CM

## 2019-09-19 PROCEDURE — 97112 NEUROMUSCULAR REEDUCATION: CPT | Performed by: PHYSICAL THERAPIST

## 2019-09-19 PROCEDURE — 97110 THERAPEUTIC EXERCISES: CPT | Performed by: PHYSICAL THERAPIST

## 2019-09-19 PROCEDURE — 97140 MANUAL THERAPY 1/> REGIONS: CPT | Performed by: PHYSICAL THERAPIST

## 2019-09-19 NOTE — PROGRESS NOTES
Physical Therapy Daily Progress Note        Becky Garcia reports 2/10 pain today at rest.  Pt reports that her shoulder is doing pretty well with less pain but the R side of her neck is really bothering her. Pt reports that she would be able to tolerate shoulder pain but she cannot ignore the neck pain.         Objective Pt present to PT today with no distress at rest.     Pt activities had to be adjusted today due to irritation in the UT and levator scap on the R side.       See Exercise, Manual, and Modality Logs for complete treatment.     Assessment/Plan  Pt continues to have irritation in the R cervical spine hindering R shoulder function. Pt continues to have good ROM in the R shoulder although it is very weak. Pt would benefit from PT to help improve shoulder stability, strength, and function.       Progress per Plan of Care  Progress as tolerated           Manual Therapy:    14     mins  10287;  Therapeutic Exercise:    35     mins  99406;     Neuromuscular Melecio:    10    mins  20936;    Therapeutic Activity:          mins  94431;     Gait Training:        ___  mins  70038;     Ultrasound:          mins  14800;    Electrical Stimulation:         mins  00803 ( );  Dry Needling          mins self-pay    Timed Treatment:   59   mins   Total Treatment:     59   mins    Mitul Cope, PT  Physical Therapist

## 2019-09-24 ENCOUNTER — TREATMENT (OUTPATIENT)
Dept: PHYSICAL THERAPY | Facility: CLINIC | Age: 29
End: 2019-09-24

## 2019-09-24 PROCEDURE — 97110 THERAPEUTIC EXERCISES: CPT | Performed by: PHYSICAL THERAPIST

## 2019-09-24 PROCEDURE — 97112 NEUROMUSCULAR REEDUCATION: CPT | Performed by: PHYSICAL THERAPIST

## 2019-09-24 PROCEDURE — 97140 MANUAL THERAPY 1/> REGIONS: CPT | Performed by: PHYSICAL THERAPIST

## 2019-09-24 NOTE — PROGRESS NOTES
Physical Therapy Daily Progress Note        Becky Garcia reports 4/10 pain today at rest.  Pt reports that her shoulder and neck are about the same as last visit. Pt reports that she has been guarded for so long that it is very difficult to relax and have proper posture.         Objective Pt present to PT today with no distress at rest.     Pt tolerated exercises well today with no increased pain and had some relief with moist heat on the neck and shoulder after PT.       See Exercise, Manual, and Modality Logs for complete treatment.     Assessment/Plan  Pt continues to have a lot of guarding in the R shoulder and is having more tightness in the UT and levator scap on the R side than usual. Pt continues to irritate neck due to guarding and has relief with approximation of the shoulder. Pt would benefit from PT to help improve shoulder strength and stability.       Progress per Plan of Care  Progress as tolerated           Manual Therapy:    11     mins  86074;  Therapeutic Exercise:     25    mins  74081;     Neuromuscular Melecio:    12    mins  11761;    Therapeutic Activity:          mins  28893;     Gait Training:        ___  mins  01594;     Ultrasound:          mins  60688;    Electrical Stimulation:         mins  90009 ( );  Dry Needling          mins self-pay    Timed Treatment:   47   mins   Total Treatment:     60   mins    Mitul Cope, PT  Physical Therapist

## 2019-10-01 ENCOUNTER — TREATMENT (OUTPATIENT)
Dept: PHYSICAL THERAPY | Facility: CLINIC | Age: 29
End: 2019-10-01

## 2019-10-01 DIAGNOSIS — M25.511 CHRONIC RIGHT SHOULDER PAIN: Primary | ICD-10-CM

## 2019-10-01 DIAGNOSIS — G89.29 CHRONIC RIGHT SHOULDER PAIN: Primary | ICD-10-CM

## 2019-10-01 PROCEDURE — 97110 THERAPEUTIC EXERCISES: CPT | Performed by: PHYSICAL THERAPIST

## 2019-10-01 PROCEDURE — 97140 MANUAL THERAPY 1/> REGIONS: CPT | Performed by: PHYSICAL THERAPIST

## 2019-10-01 PROCEDURE — 97112 NEUROMUSCULAR REEDUCATION: CPT | Performed by: PHYSICAL THERAPIST

## 2019-10-01 NOTE — PROGRESS NOTES
Physical Therapy Daily Progress Note        Becky Garcia reports 4-5/10 pain today at rest.  Pt reports that the front of her neck and shoulder have been bothering her a lot since last session. Pt reports that she does not know of any changes in PT or daily activities that might cause the increase in pain.         Objective Pt present to PT today with guarding of the R shoulder.     Pt limited to certain activities today due to pain in neck.       See Exercise, Manual, and Modality Logs for complete treatment.     Assessment/Plan  Pt has developed moderate tenderness in the R scalenes and sternocleidomastoid. Pt neck muscles have begun limiting comfort with activities. Pt would benefit from PT to help improve shoulder stability, strength, and function.       Progress per Plan of Care  Assess cervical muscles           Manual Therapy:    16     mins  33838;  Therapeutic Exercise:     15    mins  87679;     Neuromuscular Melecio:    14    mins  82152;    Therapeutic Activity:          mins  70147;     Gait Training:        ___  mins  91600;     Ultrasound:          mins  37253;    Electrical Stimulation:         mins  99868 ( );  Dry Needling          mins self-pay    Timed Treatment:   45   mins   Total Treatment:     45   mins    Mitul Cope, PT  Physical Therapist

## 2019-10-03 ENCOUNTER — TREATMENT (OUTPATIENT)
Dept: PHYSICAL THERAPY | Facility: CLINIC | Age: 29
End: 2019-10-03

## 2019-10-03 DIAGNOSIS — M25.511 CHRONIC RIGHT SHOULDER PAIN: Primary | ICD-10-CM

## 2019-10-03 DIAGNOSIS — G89.29 CHRONIC RIGHT SHOULDER PAIN: Primary | ICD-10-CM

## 2019-10-03 PROCEDURE — 97112 NEUROMUSCULAR REEDUCATION: CPT | Performed by: PHYSICAL THERAPIST

## 2019-10-03 PROCEDURE — 97110 THERAPEUTIC EXERCISES: CPT | Performed by: PHYSICAL THERAPIST

## 2019-10-03 PROCEDURE — 97140 MANUAL THERAPY 1/> REGIONS: CPT | Performed by: PHYSICAL THERAPIST

## 2019-10-03 NOTE — PROGRESS NOTES
Physical Therapy Daily Progress Note        Becky Garcia reports 4/10 pain today at rest.  Pt reports having continued pain in the sternocleidomastoid and scalenes. Pt reports that her arm feels very heavy and that her shoulder gets tired from holding up arm.         Objective Pt present to PT today with no distress at rest.     Pt tolerated limited activities due to irritation in the neck and scapular muscles.       See Exercise, Manual, and Modality Logs for complete treatment.     Assessment/Plan  Pt continues to have issues that began a couple of weeks ago with increased neck pain and irritation. Pt scalenes, sternocleidomastoid, and UT are all very tender although not hypertonic. Pt may benefit from PT to help improve shoulder stability and decrease neck and shoulder pain to return to prior level of function.       Progress per Plan of Care  Progress as tolerated           Manual Therapy:    16     mins  40366;  Therapeutic Exercise:    12     mins  85191;     Neuromuscular Melecio:    17    mins  15889;    Therapeutic Activity:          mins  51575;     Gait Training:        ___  mins  42867;     Ultrasound:          mins  56107;    Electrical Stimulation:         mins  73850 ( );  Dry Needling          mins self-pay    Timed Treatment:   45   mins   Total Treatment:     55   mins    Mitul Cope, PT  Physical Therapist

## 2019-10-08 ENCOUNTER — TREATMENT (OUTPATIENT)
Dept: PHYSICAL THERAPY | Facility: CLINIC | Age: 29
End: 2019-10-08

## 2019-10-08 DIAGNOSIS — G89.29 CHRONIC RIGHT SHOULDER PAIN: Primary | ICD-10-CM

## 2019-10-08 DIAGNOSIS — M25.511 CHRONIC RIGHT SHOULDER PAIN: Primary | ICD-10-CM

## 2019-10-08 PROCEDURE — 97140 MANUAL THERAPY 1/> REGIONS: CPT | Performed by: PHYSICAL THERAPIST

## 2019-10-08 PROCEDURE — 97110 THERAPEUTIC EXERCISES: CPT | Performed by: PHYSICAL THERAPIST

## 2019-10-08 NOTE — PROGRESS NOTES
Physical Therapy Daily Progress Note        Becky Garcia reports 4/10 pain today at rest.  Pt reports that she still hurts in the front of the shoulder and cervical spine although she feels that she can move better. Pt reports that she does not feel as tight in the shoulder.  Pt states that she feel like her shoulder is very heavy and cannot support her arm when it hangs. Pt states that her neck feels tight but cannot seem to stretch it.         Objective Pt present to PT today with no distress at rest.     Pt was very limited today with activities due to pain in the R cervical spine and the shoulder.       See Exercise, Manual, and Modality Logs for complete treatment.     Assessment/Plan  Pt shoulder and cervical spine greatly limiting activities at PT and pt is very guarded due to neck pain. Pt feels much better with approximation of shoulder into the glenoid. Pt to return to doctor on Thursday and follow up with PT the next week.       Progress per Plan of Care  Follow up with pt after doctor visit.            Manual Therapy:    13     mins  41048;  Therapeutic Exercise:    28     mins  59065;     Neuromuscular Melecio:        mins  04273;    Therapeutic Activity:          mins  75348;     Gait Training:        ___  mins  22085;     Ultrasound:          mins  34695;    Electrical Stimulation:         mins  46765 ( );  Dry Needling          mins self-pay    Timed Treatment:   41   mins   Total Treatment:     51   mins    Mitul Cope, PT  Physical Therapist

## 2019-10-10 ENCOUNTER — OFFICE VISIT (OUTPATIENT)
Dept: FAMILY MEDICINE CLINIC | Facility: CLINIC | Age: 29
End: 2019-10-10

## 2019-10-10 VITALS
HEIGHT: 66 IN | OXYGEN SATURATION: 97 % | RESPIRATION RATE: 14 BRPM | BODY MASS INDEX: 38.25 KG/M2 | SYSTOLIC BLOOD PRESSURE: 124 MMHG | HEART RATE: 94 BPM | TEMPERATURE: 97.8 F | WEIGHT: 238 LBS | DIASTOLIC BLOOD PRESSURE: 82 MMHG

## 2019-10-10 DIAGNOSIS — S43.001S SHOULDER SUBLUXATION, RIGHT, SEQUELA: ICD-10-CM

## 2019-10-10 DIAGNOSIS — Z87.39 HISTORY OF CLOSED SHOULDER DISLOCATION: ICD-10-CM

## 2019-10-10 DIAGNOSIS — M25.511 ARTHRALGIA OF RIGHT SHOULDER REGION: Primary | ICD-10-CM

## 2019-10-10 PROCEDURE — 99213 OFFICE O/P EST LOW 20 MIN: CPT | Performed by: FAMILY MEDICINE

## 2019-10-10 NOTE — PROGRESS NOTES
Established Patient        Chief Complaint:   Chief Complaint   Patient presents with   • Follow-up     3 month follow up, no longer taking Diclofenac did not help        Becky Garcia is a 28 y.o. female    History of Present Illness:   Presents for scheduled follow-up visit concerning her right shoulder arthralgia.  She continues to undergo physical therapy.  She has noticed mild improvement throughout the course of her physical therapy, but still bothered with certain positions and movement of her shoulder.  She describes a feeling of instability as well as hesitation to utilize secondary to discomfort.  Does continue to affect her sleep.  She denies any new trauma.  Denies any rashes, no reports of any fever or chills.    Subjective     The following portions of the patient's history were reviewed and updated as appropriate: allergies, current medications, past family history, past medical history, past social history, past surgical history and problem list.    Allergies   Allergen Reactions   • Zithromax [Azithromycin] Anaphylaxis       Review of Systems  1. Constitutional: Negative for fever. Negative for chills, diaphoresis, fatigue and unexpected weight change.   2. HENT: No dysphagia; no changes to vision/hearing/smell/taste; no epistaxis  3. Eyes: Negative for redness and visual disturbance.   4. Respiratory: negative for shortness of breath. Negative for chest pain . Negative for cough and chest tightness.   5. Cardiovascular: Negative for chest pain and palpitations.   6. Gastrointestinal: Negative for abdominal distention, abdominal pain and blood in stool.   7. Endocrine: Negative for cold intolerance and heat intolerance.   8. Genitourinary: Negative for difficulty urinating, dysuria and frequency.   9. Musculoskeletal: Right shoulder pain as per above.  10. Skin: Negative for color change, rash and wound.   11. Neurological: Negative for syncope, weakness and headaches.  "  12. Hematological: Negative for adenopathy. Does not bruise/bleed easily.   13. Psychiatric/Behavioral: Negative for confusion. The patient is not nervous/anxious.    Objective     Physical Exam   Vital Signs: /82   Pulse 94   Temp 97.8 °F (36.6 °C)   Resp 14   Ht 167.6 cm (66\")   Wt 108 kg (238 lb)   SpO2 97%   BMI 38.41 kg/m²     General Appearance: alert, oriented x 3, no acute distress.  Skin: warm and dry.   HEENT: Atraumatic.  pupils round and reactive to light and accommodation, oral mucosa pink and moist.  Nares patent without epistaxis.  External auditory canals are patent tympanic membranes intact.  Neck: supple, no JVD, trachea midline.  No thyromegaly  Lungs: CTA, unlabored breathing effort.  Heart: RRR, normal S1 and S2, no S3, no rub.  Abdomen: soft, non-tender, no palpable bladder, present bowel sounds to auscultation ×4.  No guarding or rigidity.  Extremities: no clubbing, cyanosis or edema.  Normal tone and development to bilateral deltoids.  Decreased range of motion to the right shoulder, empty can and Cristina test positive for discomfort.  Pain on palpation along the supraspinatus tendon passively.  Lateral subacromial space tenderness on palpation.  Negative shrug sign, push off is normal.   strength symmetric bilaterally.  Intact supination and pronation of bilateral forearms.  Apprehension sign negative.  [place]'s subjectively pos.  Without findings of winging of the scapula.  Neuro: normal speech and mental status.  Cranial nerves II through XII intact.  No anosmia. DTR 2+; proprioception intact.  No focal motor/sensory deficits.    Assessment and Plan      Assessment:   Becky was seen today for follow-up.    Diagnoses and all orders for this visit:    Arthralgia of right shoulder region  -     MRI Shoulder Right Without Contrast; Future    Shoulder subluxation, right, sequela  -     MRI Shoulder Right Without Contrast; Future    History of closed shoulder dislocation  - "     MRI Shoulder Right Without Contrast; Future        Plan:  She remains off work at this time; on short term disability given her Right arm dominance, and effects of impaired ADLs, including work obligations.  I recommended that she continue to remain off work until formal physical therapy is of no significant improvement, as well as demonstrating progressive improvement to her conditioning of her multiplanar instability of the right shoulder.  MRI ordered for evaluation.    Most recent PT notes reviewed, still having significant problems.  Discussion Summary:    Discussed plan of care in detail with pt today; pt verb understanding and agrees; counseled for approx 10 min of total 15 min exam time.  Follow up:  No Follow-up on file.     There are no Patient Instructions on file for this visit.    Reuben Mercado, DO  10/10/19  11:32 AM    Please note that portions of this note may have been completed with a voice recognition program. Efforts were made to edit the dictations, but occasionally words are mistranscribed.

## 2019-10-14 ENCOUNTER — HOSPITAL ENCOUNTER (OUTPATIENT)
Dept: MRI IMAGING | Facility: HOSPITAL | Age: 29
Discharge: HOME | End: 2019-10-14
Admitting: FAMILY MEDICINE

## 2019-10-14 DIAGNOSIS — M25.511 ARTHRALGIA OF RIGHT SHOULDER REGION: ICD-10-CM

## 2019-10-14 DIAGNOSIS — Z87.39 HISTORY OF CLOSED SHOULDER DISLOCATION: ICD-10-CM

## 2019-10-14 DIAGNOSIS — S43.001S SHOULDER SUBLUXATION, RIGHT, SEQUELA: ICD-10-CM

## 2019-10-14 PROCEDURE — 73221 MRI JOINT UPR EXTREM W/O DYE: CPT

## 2019-10-15 ENCOUNTER — TREATMENT (OUTPATIENT)
Dept: PHYSICAL THERAPY | Facility: CLINIC | Age: 29
End: 2019-10-15

## 2019-10-15 DIAGNOSIS — G89.29 CHRONIC RIGHT SHOULDER PAIN: Primary | ICD-10-CM

## 2019-10-15 DIAGNOSIS — M25.511 CHRONIC RIGHT SHOULDER PAIN: Primary | ICD-10-CM

## 2019-10-15 PROCEDURE — 97530 THERAPEUTIC ACTIVITIES: CPT | Performed by: PHYSICAL THERAPIST

## 2019-10-15 PROCEDURE — 97014 ELECTRIC STIMULATION THERAPY: CPT | Performed by: PHYSICAL THERAPIST

## 2019-10-15 PROCEDURE — 97110 THERAPEUTIC EXERCISES: CPT | Performed by: PHYSICAL THERAPIST

## 2019-10-15 PROCEDURE — 97035 APP MDLTY 1+ULTRASOUND EA 15: CPT | Performed by: PHYSICAL THERAPIST

## 2019-10-15 NOTE — PROGRESS NOTES
Physical Therapy Daily Progress Note        Becky Garcia reports 4/10 pain today at rest.  Pt reports getting an MRI yesterday with no tears or lesions noted in RC or labrum in the R shoulder. MRI shows some inflammation in the shoulder but no tears. Pt reports that shoulder and neck continue to bother her with no relief since last session.         Objective Pt present to PT today with no distress at rest.     Pt responded well to US over UT and pectorals well today and IFC on the shoulder well feeling a little better after session.       See Exercise, Manual, and Modality Logs for complete treatment.     Assessment/Plan  Pt has received MRI showing no issues in the shoulder other than some fluid and inflammation. Pt continues to have tenderness to light touch over UT, levator scap, scapular muscles, and scalenes/pectorals. Pt to follow up with PT and report response to treatment.       Progress per Plan of Care  Progress as tolerated           Manual Therapy:         mins  30799;  Therapeutic Exercise:    16     mins  80879;     Neuromuscular Melecio:        mins  84225;    Therapeutic Activity:     12     mins  87501;     Gait Training:        ___  mins  14880;     Ultrasound:     15     mins  58129;    Electrical Stimulation:    15     mins  63769 ( );  Dry Needling          mins self-pay    Timed Treatment:   58   mins   Total Treatment:     75   mins    Mitul Cope, PT  Physical Therapist

## 2019-10-17 ENCOUNTER — TREATMENT (OUTPATIENT)
Dept: PHYSICAL THERAPY | Facility: CLINIC | Age: 29
End: 2019-10-17

## 2019-10-17 DIAGNOSIS — G89.29 CHRONIC RIGHT SHOULDER PAIN: Primary | ICD-10-CM

## 2019-10-17 DIAGNOSIS — M25.511 CHRONIC RIGHT SHOULDER PAIN: Primary | ICD-10-CM

## 2019-10-17 PROCEDURE — 97035 APP MDLTY 1+ULTRASOUND EA 15: CPT | Performed by: PHYSICAL THERAPIST

## 2019-10-17 PROCEDURE — 97110 THERAPEUTIC EXERCISES: CPT | Performed by: PHYSICAL THERAPIST

## 2019-10-17 PROCEDURE — 97140 MANUAL THERAPY 1/> REGIONS: CPT | Performed by: PHYSICAL THERAPIST

## 2019-10-17 NOTE — PROGRESS NOTES
Physical Therapy Daily Progress Note        Becky Garcia reports 7/10 pain today at rest.  Pt reports that she felt good for a couple hours after last visit although then began to have a lot of pain and has had the worst pain the last 2 nights that she has had since her injury. Pt reports that she had pain down into her forearm and hand on the R UE.         Objective       Strength/Myotome Testing     Left Wrist/Hand      (2nd hand position)     Trial 1: 35 lbs    Trial 2: 36 lbs    Trial 3: 33 lbs    Average: 34.67 lbs    Right Wrist/Hand      (2nd hand position)     Trial 1: 45 lbs    Trial 2: 46 lbs    Trial 3: 35 lbs    Average: 42 lbs   Pt present to PT today with no distress at rest.     Pt tolerated very little today besides gentle ROM and distraction of the cervical spine and light shoulder exercise.     Pt draws away with light tough from PT over UT, Levator scap, scalenes, and pectorals.       See Exercise, Manual, and Modality Logs for complete treatment.     Assessment/Plan  Pt is not responding well to mobilization exercises and muscles and skin have become much more tender to touch. Pt has not shown much improvement and today was very light with modalities and activities to help improve pain and relax. PT to see response to treatment and discuss treatment options.       Progress per Plan of Care  Progress as tolerated           Manual Therapy:    14     mins  87523;  Therapeutic Exercise:    10     mins  19394;     Neuromuscular Melecio:        mins  98513;    Therapeutic Activity:          mins  47568;     Gait Training:        ___  mins  35067;     Ultrasound:     10     mins  52002;    Electrical Stimulation:         mins  32006 ( );  Dry Needling          mins self-pay    Timed Treatment:   34   mins   Total Treatment:     50   mins    Mitul Cope, PT  Physical Therapist

## 2019-10-22 ENCOUNTER — TREATMENT (OUTPATIENT)
Dept: PHYSICAL THERAPY | Facility: CLINIC | Age: 29
End: 2019-10-22

## 2019-10-22 DIAGNOSIS — M25.511 CHRONIC RIGHT SHOULDER PAIN: Primary | ICD-10-CM

## 2019-10-22 DIAGNOSIS — G89.29 CHRONIC RIGHT SHOULDER PAIN: Primary | ICD-10-CM

## 2019-10-22 PROCEDURE — 97140 MANUAL THERAPY 1/> REGIONS: CPT | Performed by: PHYSICAL THERAPIST

## 2019-10-22 PROCEDURE — 97110 THERAPEUTIC EXERCISES: CPT | Performed by: PHYSICAL THERAPIST

## 2019-10-22 PROCEDURE — 97035 APP MDLTY 1+ULTRASOUND EA 15: CPT | Performed by: PHYSICAL THERAPIST

## 2019-10-22 NOTE — PROGRESS NOTES
Physical Therapy Daily Progress Note        Becky Garcia reports 4/10 pain today at rest.  Pt reports that she had about 3 hours of relief after last session. Pt states that the US seemed to help relax the shoulder and neck a little. Pt reports that she does see some benefit from PT but it is frustrating not seeing more return for the amount of time he has put into it.         Objective Pt present to PT today with no distress at rest.     Pt guarding R shoulder.     Pt very tender to touch over R UT and levator into the anterior musculature and pectorals.       See Exercise, Manual, and Modality Logs for complete treatment.     Assessment/Plan  Pt continues to have pain in the R cervical spine and shoulder although was able to do some activities today without increased pain. Pt seems to get some relief from US. Pt to continue with PT to help improve shoulder pain and increase function.       Progress per Plan of Care  Progress as tolerated           Manual Therapy:    10     mins  02133;  Therapeutic Exercise:    27     mins  33245;     Neuromuscular Melecio:        mins  78945;    Therapeutic Activity:          mins  67745;     Gait Training:        ___  mins  45669;     Ultrasound:     10     mins  96472;    Electrical Stimulation:         mins  89312 ( );  Dry Needling          mins self-pay    Timed Treatment:   47   mins   Total Treatment:     50   mins    Mitul Cope, PT  Physical Therapist

## 2019-10-23 ENCOUNTER — TELEPHONE (OUTPATIENT)
Dept: FAMILY MEDICINE CLINIC | Facility: CLINIC | Age: 29
End: 2019-10-23

## 2019-10-23 NOTE — TELEPHONE ENCOUNTER
Pt called sts that she got her MRI results. Sts she is still having same/worsening sx and unsure what her options are for tx?

## 2019-10-23 NOTE — TELEPHONE ENCOUNTER
Patients mother called requesting a referral to [facility] in [place] for shoulder, neck and back pain.

## 2019-10-24 ENCOUNTER — TREATMENT (OUTPATIENT)
Dept: PHYSICAL THERAPY | Facility: CLINIC | Age: 29
End: 2019-10-24

## 2019-10-24 DIAGNOSIS — M25.511 CHRONIC RIGHT SHOULDER PAIN: Primary | ICD-10-CM

## 2019-10-24 DIAGNOSIS — G89.29 CHRONIC RIGHT SHOULDER PAIN: Primary | ICD-10-CM

## 2019-10-24 PROCEDURE — 97140 MANUAL THERAPY 1/> REGIONS: CPT | Performed by: PHYSICAL THERAPIST

## 2019-10-24 PROCEDURE — 97110 THERAPEUTIC EXERCISES: CPT | Performed by: PHYSICAL THERAPIST

## 2019-10-24 NOTE — PROGRESS NOTES
Physical Therapy Daily Progress Note        Becky Garcia reports 5/10 pain today at rest.  Pt reports having about 4 hours of relief after last session and then her pain returned. Pt reports contacting PCP and he has said that he will look into getting her in with a neurologist for further work up for pain in the neck and shoulder.         Objective Pt present to PT today with guarding in the R shoulder.     Pt has some increased neck irritation with activities today.       See Exercise, Manual, and Modality Logs for complete treatment.     Assessment/Plan  Pt continues to have pain and irritation in the R shoulder and cervical spine with tenderness to touch. Pt and PT discussed continuing PT until determining course of care with PCP and possibly the neurologist. Pt to follow up with PT next week.       Progress per Plan of Care  Progress as tolerated           Manual Therapy:    9     mins  44152;  Therapeutic Exercise:    22     mins  32566;     Neuromuscular Melecio:        mins  75682;    Therapeutic Activity:          mins  61747;     Gait Training:        ___  mins  54751;     Ultrasound:          mins  14359;    Electrical Stimulation:         mins  51955 ( );  Dry Needling          mins self-pay    Timed Treatment:   31   mins   Total Treatment:     51   mins    Mitul Cope, PT  Physical Therapist

## 2019-10-25 DIAGNOSIS — S43.001S SHOULDER SUBLUXATION, RIGHT, SEQUELA: ICD-10-CM

## 2019-10-25 DIAGNOSIS — M25.511 ARTHRALGIA OF RIGHT SHOULDER REGION: Primary | ICD-10-CM

## 2019-10-25 PROBLEM — M75.101: Status: ACTIVE | Noted: 2019-10-25

## 2019-10-25 NOTE — TELEPHONE ENCOUNTER
Referral to a neurologist in [place] is impossible, as there is not one there.  Her symptoms of neck and shoulder discomfort are result of her supraspinatus syndrome, otherwise referred to as rotator cuff tendinitis, of the right shoulder.    She still request to see neurologist, a referral can be placed for her to see one in [place] is that would be the closest.

## 2019-10-25 NOTE — TELEPHONE ENCOUNTER
I have placed a referral for her to see orthopedic surgery.  She should consider arthrocentesis of the right shoulder, this is an injection with steroid medication, however prefer her to be seen by orthopedic surgery given her recalcitrant response to lengthy physical therapy course.  This is also due to her complaints of worsening symptoms despite treatment.

## 2019-10-30 DIAGNOSIS — M79.18 CERVICAL MYOFASCIAL PAIN SYNDROME: Chronic | ICD-10-CM

## 2019-10-30 DIAGNOSIS — G43.C0 PERIODIC HEADACHE SYNDROME, NOT INTRACTABLE: Primary | ICD-10-CM

## 2019-10-31 ENCOUNTER — TREATMENT (OUTPATIENT)
Dept: PHYSICAL THERAPY | Facility: CLINIC | Age: 29
End: 2019-10-31

## 2019-10-31 DIAGNOSIS — M25.511 CHRONIC RIGHT SHOULDER PAIN: Primary | ICD-10-CM

## 2019-10-31 DIAGNOSIS — G89.29 CHRONIC RIGHT SHOULDER PAIN: Primary | ICD-10-CM

## 2019-10-31 PROCEDURE — 97110 THERAPEUTIC EXERCISES: CPT | Performed by: PHYSICAL THERAPIST

## 2019-10-31 PROCEDURE — 97140 MANUAL THERAPY 1/> REGIONS: CPT | Performed by: PHYSICAL THERAPIST

## 2019-10-31 NOTE — PROGRESS NOTES
Physical Therapy Daily Progress Note        Becky Garcia reports 5/10 pain today at rest.  Pt reports that she had 3 hours of relief after last session and then pain returned. Pt states that she has an ortho appointment on 11/5 and an appointment with neuro on 12/3.         Objective Pt present to PT today with no distress at rest.     Pt had increased irritation with exercises today and refused heat or US because the pressure on her neck and shoulder would be too intense.       See Exercise, Manual, and Modality Logs for complete treatment.     Assessment/Plan  Pt continues to have increasing tenderness and sensitivity in the R cervical spine and UT/levator scap. Pt did not have any relief today after PT. Pt to see ortho next week for further work up next week.       Progress per Plan of Care  Await ortho visit.            Manual Therapy:    12     mins  82303;  Therapeutic Exercise:    20     mins  52847;     Neuromuscular Melecio:        mins  17317;    Therapeutic Activity:          mins  43841;     Gait Training:        ___  mins  33141;     Ultrasound:          mins  70300;    Electrical Stimulation:         mins  18733 ( );  Dry Needling          mins self-pay    Timed Treatment:   32   mins   Total Treatment:     36   mins    Mitul Cope, PT  Physical Therapist

## 2019-11-05 ENCOUNTER — TREATMENT (OUTPATIENT)
Dept: PHYSICAL THERAPY | Facility: CLINIC | Age: 29
End: 2019-11-05

## 2019-11-05 ENCOUNTER — OFFICE VISIT (OUTPATIENT)
Dept: ORTHOPEDIC SURGERY | Facility: CLINIC | Age: 29
End: 2019-11-05

## 2019-11-05 VITALS — WEIGHT: 238 LBS | BODY MASS INDEX: 38.25 KG/M2 | HEIGHT: 66 IN | RESPIRATION RATE: 18 BRPM

## 2019-11-05 DIAGNOSIS — M54.12 CERVICAL RADICULOPATHY: ICD-10-CM

## 2019-11-05 DIAGNOSIS — IMO0002 BURSITIS/TENDONITIS, SHOULDER: Primary | ICD-10-CM

## 2019-11-05 DIAGNOSIS — M25.511 CHRONIC RIGHT SHOULDER PAIN: Primary | ICD-10-CM

## 2019-11-05 DIAGNOSIS — G89.29 CHRONIC RIGHT SHOULDER PAIN: Primary | ICD-10-CM

## 2019-11-05 PROCEDURE — 97140 MANUAL THERAPY 1/> REGIONS: CPT | Performed by: PHYSICAL THERAPIST

## 2019-11-05 PROCEDURE — 99204 OFFICE O/P NEW MOD 45 MIN: CPT | Performed by: ORTHOPAEDIC SURGERY

## 2019-11-05 PROCEDURE — 97110 THERAPEUTIC EXERCISES: CPT | Performed by: PHYSICAL THERAPIST

## 2019-11-05 NOTE — PROGRESS NOTES
Subjective   Patient ID: Becky Garcia is a 28 y.o. female  Pain of the Right Shoulder (Patient is here today for shoulder pain, she states a year and a half ago she dislocated the shoulder and for the past year it's really been bothering her. 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  Had injury to her right shoulder while lifting her mother up off the floor felt her shoulder dislocate had to go to the walk-in where she had a reduced ever since and has had no recurrent dislocations.  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  Complains of chronic neck pain stiffness right-sided neck pain and radiating to the right posterior shoulder blade area.  Also has loss of feeling in the right hand palmar aspect median nerve distribution with weakness in the hand and elbow.  She completed nearly 3 months of physical therapy with no improvement in her right shoulder pain.  She did not improve with p.o. as well as topical Voltaren.    Review of Systems   Constitutional: Negative for fever.   HENT: Negative for dental problem and voice change.    Eyes: Negative for visual disturbance.   Respiratory: Negative for shortness of breath.    Cardiovascular: Negative for chest pain.   Gastrointestinal: Negative for abdominal pain.   Genitourinary: Negative for dysuria.   Musculoskeletal: Positive for arthralgias. Negative for gait problem and joint swelling.   Skin: Negative for rash.   Neurological: Negative for speech difficulty.   Hematological: Does not bruise/bleed easily.   Psychiatric/Behavioral: Negative for confusion.       Past Medical History:   Diagnosis Date   • Depression    • Hx of  "migraine headaches         Past Surgical History:   Procedure Laterality Date   • EYE SURGERY  2007    upper eyelid    • EYE SURGERY  2015    upper eyelid       Family History   Problem Relation Age of Onset   • Hypertension Mother    • Hypertension Father    • Cancer Maternal Grandmother    • Arthritis Paternal Grandmother    • Cancer Paternal Grandfather        Social History     Socioeconomic History   • Marital status: Single     Spouse name: Not on file   • Number of children: Not on file   • Years of education: Not on file   • Highest education level: Not on file   Tobacco Use   • Smoking status: Never Smoker   • Smokeless tobacco: Never Used   Substance and Sexual Activity   • Alcohol use: No     Frequency: Never   • Drug use: No   • Sexual activity: Defer       I have reviewed the above medical and surgical history, family history, social history, medications, allergies and review of systems.    Allergies   Allergen Reactions   • Zithromax [Azithromycin] Anaphylaxis       No current outpatient medications on file.    Objective   Resp 18   Ht 167.6 cm (66\")   Wt 108 kg (238 lb)   BMI 38.41 kg/m²    Physical Exam  Constitutional: Patient is oriented to person, place, and time. Patient appears well-developed and well-nourished.   HENT:Head: Normocephalic and atraumatic.   Eyes: EOM are normal. Pupils are equal, round, and reactive to light.   Neck: Normal range of motion. Neck supple.   Cardiovascular: Normal rate.    Pulmonary/Chest: Effort normal and breath sounds normal.   Abdominal: Soft.   Neurological: Patient is alert and oriented to person, place, and time.   Skin: Skin is warm and dry.   Psychiatric: Patient has a normal mood and affect.   Nursing note and vitals reviewed.       [unfilled]   Cervical spine: Significant paracervical spasm with limited rotation and bending especially to the right side which reproduces posterior shoulder pain and posterior cervical spasm, Spurling maneuver is positive " for right shoulder pain.    Right shoulder: No significant atrophy no significant skin changes slight loss 1 hand breath internal rotation only 20 degrees loss abduction full external rotation normal strength throughout negative anterior apprehension sign negative instability signs.    Right hand: Negative Tinel sign over the median nerve at the wrist mildly positive carpal compression sign decreased sensation median nerve distribution decreased strength at the right elbow flexion only grade 4 strength.    Assessment/Plan   Review of Radiographic Studies:    Radiographic images today of affected area I personally viewed and showed no sign of acute fracture or dislocation.  MR images right shoulder were directly examined showed no sign of significant full-thickness tear only minor supraspinatus tendinopathy      Procedures     Becky was seen today for pain.    Diagnoses and all orders for this visit:    Bursitis/tendonitis, shoulder  -     EMG & Nerve Conduction Test    Cervical radiculopathy  -     MRI Cervical Spine Without Contrast       Orthopedic activities reviewed and patient expressed appreciation, Risk, benefits, and merits of treatment alternatives reviewed with the patient and questions answered and Use brace as instructed      Recommendations/Plan:   Work/Activity Status: Sedentary duty    Patient agreeable to call or return sooner for any concerns.             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

## 2019-11-05 NOTE — PROGRESS NOTES
Physical Therapy Daily Progress Note        Becky Garcia reports 5/10 pain today at rest.  Pt reports that she feels about the same. Pt states that she sees the orthopedic surgeon today for appointment. Pt states that she feels like her R neck and shoulder is very tight. Pt reports that she only got 3 hours of sleep last night due to irritation.         Objective Pt present to PT today with no distress at rest.     Pt tolerated exercises well today with no increased pain in the R shoulder or neck although no relief.       See Exercise, Manual, and Modality Logs for complete treatment.     Assessment/Plan  Pt continues to have a lot of tenderness in the cervical spine on the R side. Pt reports that her R cervical spine and shoulder feels very tight although L side is more hypertonic when palpated. Pt to continue with PT as she continues to follow up with surgeon and neuro for further work up.       Progress per Plan of Care  Progress as tolerated           Manual Therapy:    12     mins  57658;  Therapeutic Exercise:    27     mins  22202;     Neuromuscular Melecio:        mins  09786;    Therapeutic Activity:          mins  19113;     Gait Training:        ___  mins  22521;     Ultrasound:          mins  79115;    Electrical Stimulation:         mins  07533 ( );  Dry Needling          mins self-pay    Timed Treatment:   39   mins   Total Treatment:     39   mins    Mitul Cope, PT  Physical Therapist

## 2019-11-07 ENCOUNTER — TREATMENT (OUTPATIENT)
Dept: PHYSICAL THERAPY | Facility: CLINIC | Age: 29
End: 2019-11-07

## 2019-11-07 DIAGNOSIS — M25.511 CHRONIC RIGHT SHOULDER PAIN: Primary | ICD-10-CM

## 2019-11-07 DIAGNOSIS — G89.29 CHRONIC RIGHT SHOULDER PAIN: Primary | ICD-10-CM

## 2019-11-07 PROCEDURE — 97140 MANUAL THERAPY 1/> REGIONS: CPT | Performed by: PHYSICAL THERAPIST

## 2019-11-07 PROCEDURE — 97110 THERAPEUTIC EXERCISES: CPT | Performed by: PHYSICAL THERAPIST

## 2019-11-07 PROCEDURE — 97112 NEUROMUSCULAR REEDUCATION: CPT | Performed by: PHYSICAL THERAPIST

## 2019-11-07 NOTE — PROGRESS NOTES
Physical Therapy Daily Progress Note        Becky Garcia reports 5/10 pain today at rest.  Pt reports that she has an MRI and EMG next week on Tuesday and Wednesday respectively. Pt reports that she has not had any relief in the neck or shoulder. Pt reports that her feet have begun tingling the last couple of days and does not know why.         Objective Pt present to PT today with no distress at rest.     Pt able to perform exercises today for scapular mobility and strengthening but had no relief.       See Exercise, Manual, and Modality Logs for complete treatment.     Assessment/Plan  Pt continues to have severe tenderness and moderate pain in the R cervical spine and scapular muscles. Pt to follow up with PT after getting MRI and EMG next week.       Progress per Plan of Care  Await test results           Manual Therapy:    10     mins  85730;  Therapeutic Exercise:     14    mins  12932;     Neuromuscular Melecio:    18    mins  03317;    Therapeutic Activity:          mins  15783;     Gait Training:        ___  mins  51469;     Ultrasound:          mins  99707;    Electrical Stimulation:         mins  83053 ( );  Dry Needling          mins self-pay    Timed Treatment:   42   mins   Total Treatment:     47   mins    Mitul Cope, PT  Physical Therapist

## 2019-11-12 ENCOUNTER — HOSPITAL ENCOUNTER (OUTPATIENT)
Dept: MRI IMAGING | Facility: HOSPITAL | Age: 29
Discharge: HOME | End: 2019-11-12
Admitting: ORTHOPAEDIC SURGERY

## 2019-11-12 PROCEDURE — 72141 MRI NECK SPINE W/O DYE: CPT

## 2019-11-14 ENCOUNTER — TREATMENT (OUTPATIENT)
Dept: PHYSICAL THERAPY | Facility: CLINIC | Age: 29
End: 2019-11-14

## 2019-11-14 DIAGNOSIS — M25.511 CHRONIC RIGHT SHOULDER PAIN: Primary | ICD-10-CM

## 2019-11-14 DIAGNOSIS — G89.29 CHRONIC RIGHT SHOULDER PAIN: Primary | ICD-10-CM

## 2019-11-14 PROCEDURE — 97110 THERAPEUTIC EXERCISES: CPT | Performed by: PHYSICAL THERAPIST

## 2019-11-14 PROCEDURE — 97140 MANUAL THERAPY 1/> REGIONS: CPT | Performed by: PHYSICAL THERAPIST

## 2019-11-14 NOTE — PROGRESS NOTES
Physical Therapy Daily Progress Note        Becky Garcia reports 7/10 pain today at rest.  Pt reports getting EMG done yesterday and having some soreness today from the needles. Pt reports getting an MRI done one her cervical spine but has not received her results as of yet.         Objective Pt present to PT today with no distress at rest.     Pt with 4-5/10 pain with SL on L side and 3-4/10 in prone.     Pt tolerated exercises today although had no relief in shoulders, cervical, or thoracic spine.       See Exercise, Manual, and Modality Logs for complete treatment.     Assessment/Plan  Pt continues to have a lot of pain in the shoulder, neck, and thoracic spine. Pt has not abnormal findings in cervical spine on MRI and tendinosis in the shoulder with MRI of shoulder. PT suggested pt try to get access to a pool to help improve pain free movement and relax her shoulders and spine. Pt to continue with PT as doctors continues to triage pt.       Progress per Plan of Care  Progress as tolerated           Manual Therapy:   12      mins  96507;  Therapeutic Exercise:    36     mins  67334;     Neuromuscular Melecio:        mins  65933;    Therapeutic Activity:          mins  52203;     Gait Training:        ___  mins  43992;     Ultrasound:          mins  00088;    Electrical Stimulation:         mins  24307 ( );  Dry Needling          mins self-pay    Timed Treatment:   48   mins   Total Treatment:     48   mins    Mitul Cope, PT  Physical Therapist

## 2019-11-20 ENCOUNTER — TREATMENT (OUTPATIENT)
Dept: PHYSICAL THERAPY | Facility: CLINIC | Age: 29
End: 2019-11-20

## 2019-11-20 DIAGNOSIS — G89.29 CHRONIC RIGHT SHOULDER PAIN: Primary | ICD-10-CM

## 2019-11-20 DIAGNOSIS — M25.511 CHRONIC RIGHT SHOULDER PAIN: Primary | ICD-10-CM

## 2019-11-20 PROCEDURE — 97140 MANUAL THERAPY 1/> REGIONS: CPT | Performed by: PHYSICAL THERAPIST

## 2019-11-20 PROCEDURE — 97112 NEUROMUSCULAR REEDUCATION: CPT | Performed by: PHYSICAL THERAPIST

## 2019-11-20 PROCEDURE — 97110 THERAPEUTIC EXERCISES: CPT | Performed by: PHYSICAL THERAPIST

## 2019-11-20 NOTE — PROGRESS NOTES
Physical Therapy Daily Progress Note        Becky Garcia reports 5/10 pain today at rest.  Pt reports having her EMG done last week although has not received the results. Pt states that she follows up with ortho next week and will hopefully get the results of her cervical MRI and EMG. Pt states that she feels about the same as she has the last month with pain in the neck and R shoulder.         Objective Pt present to PT today with no distress at rest.     Pt tolerated exercises well today although had to push through some discomfort during exercises although no increased pain afterwards.       See Exercise, Manual, and Modality Logs for complete treatment.     Assessment/Plan  Pt continues to have minimal effect or relief from PT in the neck and shoulder. Pt is going to ortho again next week and will hopefully get results for EMG and MRI. Pt to follow up with PT next week to discuss POC.       Progress per Plan of Care  Follow up next week           Manual Therapy:    12     mins  27154;  Therapeutic Exercise:     28    mins  67242;     Neuromuscular Melecio:    10    mins  97866;    Therapeutic Activity:          mins  50952;     Gait Training:        ___  mins  98879;     Ultrasound:          mins  97631;    Electrical Stimulation:         mins  87339 ( );  Dry Needling          mins self-pay    Timed Treatment:   50   mins   Total Treatment:     54   mins    Mitul Cope, PT  Physical Therapist

## 2019-11-26 ENCOUNTER — OFFICE VISIT (OUTPATIENT)
Dept: ORTHOPEDIC SURGERY | Facility: CLINIC | Age: 29
End: 2019-11-26

## 2019-11-26 VITALS — WEIGHT: 238 LBS | BODY MASS INDEX: 38.25 KG/M2 | RESPIRATION RATE: 18 BRPM | HEIGHT: 66 IN

## 2019-11-26 DIAGNOSIS — G89.4 CHRONIC PAIN SYNDROME: Primary | ICD-10-CM

## 2019-11-26 PROCEDURE — 99213 OFFICE O/P EST LOW 20 MIN: CPT | Performed by: ORTHOPAEDIC SURGERY

## 2019-11-26 NOTE — PROGRESS NOTES
Subjective   Patient ID: Becky Garcia is a 29 y.o. female  Follow-up and Pain of the Right Arm (Patient is her today for MRI and EMG results.); Follow-up and Pain of the Right Shoulder; and Follow-up and Pain of the Cervical Spine             History of Present Illness  She continues to complain of chronic neck pain that radiates to the shoulder arm and hand at times, recent MR cervical spine was unremarkable, recent EMG study right arm was unremarkable.  She had an unremarkable MRI of the shoulder as well.  She is scheduled to see neurology for neurology consult regarding her chronic pain condition and inability to sleep.      Review of Systems   Constitutional: Negative for fever.   HENT: Negative for dental problem and voice change.    Eyes: Negative for visual disturbance.   Respiratory: Negative for shortness of breath.    Cardiovascular: Negative for chest pain.   Gastrointestinal: Negative for abdominal pain.   Genitourinary: Negative for dysuria.   Musculoskeletal: Positive for arthralgias. Negative for gait problem and joint swelling.   Skin: Negative for rash.   Neurological: Negative for speech difficulty.   Hematological: Does not bruise/bleed easily.   Psychiatric/Behavioral: Negative for confusion.       Past Medical History:   Diagnosis Date   • Depression    • Hx of migraine headaches         Past Surgical History:   Procedure Laterality Date   • EYE SURGERY  2007    upper eyelid    • EYE SURGERY  2015    upper eyelid       Family History   Problem Relation Age of Onset   • Hypertension Mother    • Hypertension Father    • Cancer Maternal Grandmother    • Arthritis Paternal Grandmother    • Cancer Paternal Grandfather        Social History     Socioeconomic History   • Marital status: Single     Spouse name: Not on file   • Number of children: Not on file   • Years of education: Not on file   • Highest education level: Not on file   Tobacco Use   • Smoking status: Never Smoker   • Smokeless  "tobacco: Never Used   Substance and Sexual Activity   • Alcohol use: No     Frequency: Never   • Drug use: No   • Sexual activity: Defer       I have reviewed the above medical and surgical history, family history, social history, medications, allergies and review of systems.    Allergies   Allergen Reactions   • Zithromax [Azithromycin] Anaphylaxis       No current outpatient medications on file.    Objective   Resp 18   Ht 167.6 cm (66\")   Wt 108 kg (238 lb)   BMI 38.41 kg/m²    Physical Exam  Constitutional: Patient is oriented to person, place, and time. Patient appears well-developed and well-nourished.   HENT:Head: Normocephalic and atraumatic.   Eyes: EOM are normal. Pupils are equal, round, and reactive to light.   Neck: Normal range of motion. Neck supple.   Cardiovascular: Normal rate.    Pulmonary/Chest: Effort normal and breath sounds normal.   Abdominal: Soft.   Neurological: Patient is alert and oriented to person, place, and time.   Skin: Skin is warm and dry.   Psychiatric: Patient has a normal mood and affect.   Nursing note and vitals reviewed.       [unfilled]   Right shoulder: Full active range of motion no focal weakness impingement atrophy spasm regarding with forward elevation internal rotation.    Both hands demonstrate full painless wrist range of motion no signs of peripheral nerve entrapment in the carpal tunnel or cubital tunnel regions with negative Tinel's findings in those areas.    Assessment/Plan   Review of Radiographic Studies:    No new imaging done today.      Procedures     Becky was seen today for follow-up, pain, follow-up, pain, follow-up and pain.    Diagnoses and all orders for this visit:    Chronic pain syndrome       Orthopedic activities reviewed and patient expressed appreciation      Recommendations/Plan:   Work/Activity Status: May perform usual activities as tolerated    Patient agreeable to call or return sooner for any concerns.     "         Impression:  Chronic painful neck condition with normal MRI normal EMG study and normal shoulder MRI without physical signs of impingement weakness or instability of the shoulder.  Plan:  Neurology to evaluate for underlying etiology for chronic pain,  refer to pain management if neurology work-up unremarkable.  Orthopedic follow-up as necessary

## 2019-11-27 ENCOUNTER — TREATMENT (OUTPATIENT)
Dept: PHYSICAL THERAPY | Facility: CLINIC | Age: 29
End: 2019-11-27

## 2019-11-27 DIAGNOSIS — M25.511 CHRONIC RIGHT SHOULDER PAIN: Primary | ICD-10-CM

## 2019-11-27 DIAGNOSIS — G89.29 CHRONIC RIGHT SHOULDER PAIN: Primary | ICD-10-CM

## 2019-11-27 PROCEDURE — 97530 THERAPEUTIC ACTIVITIES: CPT | Performed by: PHYSICAL THERAPIST

## 2019-11-27 PROCEDURE — 97110 THERAPEUTIC EXERCISES: CPT | Performed by: PHYSICAL THERAPIST

## 2019-11-27 NOTE — PROGRESS NOTES
Physical Therapy Daily Progress Note        Becky Garcia reports 6/10 pain today at rest.  Pt reports that she has not felt any better. Pt reports that EMG and MRI results showed no abnormalities. Pt reports that she is frustrated and upset that she cannot figure out what is going on. Pt states that she is seeing neuro next week and will contact PT if she needs to continue PT. Pt states that she is very stiff and sore in the mornings and takes several hours to get moving decently in the morning. Pt states that her back is very tender and avoids laying on it. Pt reports that she feels really good from about 3-6 in the afternoon. Pt states that she tosses and turns during the night.         Objective Pt present to PT today with no distress at rest.     Pt tender through whole thoracic, cervical, and lumbar spine.     Pt tender through ITBs and over hips/greater trochanters. Pt draws away with palpation.       See Exercise, Manual, and Modality Logs for complete treatment.     Assessment/Plan  Pt continues to have pain in the shoulder, neck and back. Pt goes to neuro next week. Pt encouraged to look into swimming to help relieve pain and increase movement. Pt to hold on PT until after neuro appointment.       Other  Hold PT           Manual Therapy:         mins  41352;  Therapeutic Exercise:    13     mins  57485;     Neuromuscular Melecio:        mins  98427;    Therapeutic Activity:     16     mins  27983;     Gait Training:        ___  mins  06276;     Ultrasound:          mins  25777;    Electrical Stimulation:         mins  39963 ( );  Dry Needling          mins self-pay    Timed Treatment:   29   mins   Total Treatment:     55   mins    Mitul Cope, PT  Physical Therapist

## 2019-12-03 ENCOUNTER — OFFICE VISIT (OUTPATIENT)
Dept: NEUROLOGY | Facility: CLINIC | Age: 29
End: 2019-12-03

## 2019-12-03 VITALS
WEIGHT: 238 LBS | HEIGHT: 66 IN | BODY MASS INDEX: 38.25 KG/M2 | SYSTOLIC BLOOD PRESSURE: 126 MMHG | DIASTOLIC BLOOD PRESSURE: 82 MMHG

## 2019-12-03 DIAGNOSIS — M79.18 DIFFUSE MYOFASCIAL PAIN SYNDROME: Primary | ICD-10-CM

## 2019-12-03 PROCEDURE — 99204 OFFICE O/P NEW MOD 45 MIN: CPT | Performed by: PSYCHIATRY & NEUROLOGY

## 2019-12-03 RX ORDER — IBUPROFEN 200 MG
200 TABLET ORAL EVERY 6 HOURS PRN
COMMUNITY
End: 2020-06-03

## 2019-12-03 RX ORDER — ACETAMINOPHEN 500 MG
500 TABLET ORAL EVERY 6 HOURS PRN
COMMUNITY
End: 2020-06-03

## 2019-12-03 NOTE — PROGRESS NOTES
Subjective:    CC: Becky Garcia is seen today in consultation at the request of Reuben Mercado DO for Headache and myofacial pain       HPI:  Patient is a 29-year-old female without any significant past medical history was doing fine until March 2018 when she reports that she started having right shoulder pain and was diagnosed to have right rotator cuff strain.  She was put in a sling for a few weeks and it helped significantly with the symptoms.  However, in August 2018, symptoms returned and she had to go to urgent care again.  X-ray performed at that time did not reveal any abnormalities and she was then referred to orthopedic surgery for further evaluation and treatment.  She underwent MRI of right shoulder which showed Mild acromioclavicular joint space arthrosis with tendinosis involving the supraspinatus tendon.  She was treated with steroids and has had physical therapy since then.  She reports that physical therapy and steroids has not helped and in fact the symptoms have gradually become worse.  Now she reports that the pain is not only in shoulder but also radiates to involve right hand.  She reports chronic bilateral neck pain, left arm pain sometimes low back pain and also tingling and numbness in both her hands as well as feet.  She reports chronic fatigue, depressed mood as well.  She reports that she has been on short-term disability since June of this year.  She works as a  for [employer].  She also underwent MRI of cervical spine which I reviewed personally and it did not reveal any abnormalities.  She has had EMG/nerve conduction study which was normal as well.  In addition to this she also reports dull cervical and occipital area headache almost on a daily basis.      The following portions of the patient's history were reviewed today and updated as of 12/03/2019  : allergies, social history and problem list.  This document will be scanned to patient's chart.      Current  "Outpatient Medications:   •  acetaminophen (TYLENOL) 500 MG tablet, Take 500 mg by mouth Every 6 (Six) Hours As Needed for Mild Pain ., Disp: , Rfl:   •  ibuprofen (ADVIL,MOTRIN) 200 MG tablet, Take 200 mg by mouth Every 6 (Six) Hours As Needed for Mild Pain ., Disp: , Rfl:    Past Medical History:   Diagnosis Date   • Depression    • Hx of migraine headaches       Past Surgical History:   Procedure Laterality Date   • EYE SURGERY  2007    upper eyelid    • EYE SURGERY  2015    upper eyelid      Family History   Problem Relation Age of Onset   • Hypertension Mother    • Hypertension Father    • Cancer Maternal Grandmother    • Arthritis Paternal Grandmother    • Cancer Paternal Grandfather       Review of Systems   Constitutional: Positive for activity change and fatigue.   Eyes: Positive for photophobia and pain.   Respiratory: Positive for shortness of breath.    Gastrointestinal: Positive for abdominal pain and nausea.   Endocrine: Positive for cold intolerance.   Musculoskeletal: Positive for back pain, gait problem, myalgias and neck pain.   Neurological: Positive for weakness, numbness and headache.   Psychiatric/Behavioral: Positive for agitation, sleep disturbance and depressed mood. The patient is nervous/anxious.        All other systems reviewed and are negative     Objective:    /82   Ht 167.6 cm (66\")   Wt 108 kg (238 lb)   BMI 38.41 kg/m²     Neurology Exam:    General apperance: NAD.     Mental status: Alert, awake and oriented to time place and person.    Recent and Remote memory: Can recall 3/3 objects at 5 minutes. Can recall historical events.     Attention span and Concentration: Serial 7s: Normal.     Fund of knowledge:  Normal.     Language and Speech: No aphasia or dysarthria.    Naming , Repitition and Comprehension:  Can name objects, repeat a sentence and follow commands. Speech is clear and fluent with good repetition, comprehension, and naming.    Cranial Nerves:   CN II: Visual " fields are full. Intact. Fundi - Normal, No papillederma, Pupils - CLAUDIA  CN III, IV and VI: Extraocular movements are intact. Normal saccades.   CN V: Facial sensation is intact.   CN VII: Muscles of facial expression reveal no asymmetry. Intact.   CN VIII: Hearing is intact. Whispered voice intact.   CN IX and X: Palate elevates symmetrically. Intact  CN XI: Shoulder shrug is intact.   CN XII: Tongue is midline without evidence of atrophy or fasciculation.     Motor:  Right UE muscle strength 5/5. Normal tone.  Giveaway weakness noted.    Left UE muscle strength 5/5. Normal tone. Giveaway weakness noted.     Right LE muscle strength5/5. Normal tone. Giveaway weakness noted.    Left LE muscle strength 5/5. Normal tone.  Giveaway weakness noted.    Sensory: Normal light touch, vibration and pinprick sensation bilaterally.    DTRs: 2+ bilaterally in upper and lower extremities.    Babinski: Negative bilaterally.    Co-ordination: Normal finger-to-nose, heel to shin B/L.    Rhomberg: Negative.    Gait: Normal.    Cardiovascular: Regular rate and rhythm without murmur, gallop or rub.    Ophthalmoscopic exam: Normal fundi, no papilledema.    Assessment and Plan:  1. Diffuse myofascial pain syndrome  Patient with long-term history of right shoulder pain, right rotator cuff strain/supraspinatus tendinitis which has now progressed to cause generalized muscle pain involving the cervical area, bilateral shoulder area, both arms and legs.  I reviewed MRI cervical spine and EMG/nerve conduction study results in detail with the patient and have reassured her that considering normal MRI of cervical spine and EMG/nerve conduction study, she should benefit with continued physical therapy, pain management referral with possible consideration of Lyrica to help with the symptoms.  She possibly has fibromyalgia.  She is reporting chronic fatigue and poor sleep quality and likely has sleep apnea based on her symptoms as well.  She will  likely need sleep study.  I have advised her to talk to her primary care physician to arrange for pain management referral and possible sleep study.  I plan to see her back in clinic as needed.    Return if symptoms worsen or fail to improve.     Jef Webb MD

## 2019-12-10 ENCOUNTER — TELEPHONE (OUTPATIENT)
Dept: NEUROLOGY | Facility: CLINIC | Age: 29
End: 2019-12-10

## 2019-12-10 NOTE — TELEPHONE ENCOUNTER
----- Message from Lily Marrero, Annabeled Rep sent at 12/10/2019  8:50 AM EST -----  Contact: PT  Jon    PT called/LVM, she'd like to know if Dr. Webb has referred her to pain management yet because she hasn't heard anything since her visit on 12/3.    Please call Becky back: [phone]

## 2019-12-11 NOTE — TELEPHONE ENCOUNTER
I discussed with her during the visit that she should talk to her primary care physician with regards to pain management referral and also for sleep study.  Thanks.

## 2019-12-18 NOTE — TELEPHONE ENCOUNTER
Pt mom, Megha, called back, and I informed her, Per Dr. Webb,  he discussed with her during the visit that she should talk to her primary care physician with regards to pain management referral and also for sleep study. Megha acknowledges understanding and states she will contact them.

## 2019-12-20 ENCOUNTER — OFFICE VISIT (OUTPATIENT)
Dept: FAMILY MEDICINE CLINIC | Facility: CLINIC | Age: 29
End: 2019-12-20

## 2019-12-20 VITALS
BODY MASS INDEX: 38.73 KG/M2 | TEMPERATURE: 98.8 F | WEIGHT: 241 LBS | DIASTOLIC BLOOD PRESSURE: 80 MMHG | OXYGEN SATURATION: 98 % | HEIGHT: 66 IN | SYSTOLIC BLOOD PRESSURE: 130 MMHG | HEART RATE: 72 BPM

## 2019-12-20 DIAGNOSIS — M79.18 CERVICAL MYOFASCIAL PAIN SYNDROME: Primary | Chronic | ICD-10-CM

## 2019-12-20 DIAGNOSIS — G56.41 COMPLEX REGIONAL PAIN SYNDROME TYPE 2 OF RIGHT UPPER EXTREMITY: ICD-10-CM

## 2019-12-20 PROCEDURE — 99214 OFFICE O/P EST MOD 30 MIN: CPT | Performed by: FAMILY MEDICINE

## 2019-12-20 RX ORDER — DULOXETIN HYDROCHLORIDE 20 MG/1
20 CAPSULE, DELAYED RELEASE ORAL DAILY
Qty: 30 CAPSULE | Refills: 1 | Status: SHIPPED | OUTPATIENT
Start: 2019-12-20 | End: 2020-01-20 | Stop reason: SDUPTHER

## 2019-12-20 RX ORDER — GABAPENTIN 100 MG/1
100 CAPSULE ORAL 2 TIMES DAILY
Qty: 60 CAPSULE | Refills: 0 | Status: SHIPPED | OUTPATIENT
Start: 2019-12-20 | End: 2020-01-20 | Stop reason: SDUPTHER

## 2019-12-23 NOTE — PATIENT INSTRUCTIONS
Complex Regional Pain Syndrome  Complex regional pain syndrome (CRPS) is a nerve disorder that causes long-term (chronic) pain. This is usually in a hand, arm, foot, or leg. CRPS usually occurs after an injury or trauma, such as a fracture or sprain.  There are two types of CRPS:  · Type 1. This type occurs after an injury with no known damage to a nerve.  · Type 2. This type occurs after an injury that damages a nerve.  There are three stages of the condition:  · Stage 1. This stage, called the acute stage, may last for up to 3 months.  · Stage 2. This stage, called the dystrophic stage, may last for 3-12 months.  · Stage 3. This stage, called the atrophic stage, may start after one year.  CRPS ranges from mild to severe. For most people, CRPS is mild and recovery happens over time. For others, CRPS lasts for a very long time and makes everyday tasks hard to do.  What are the causes?  The exact cause of this condition is not known. It is usually triggered by an injury.  What increases the risk?  You are more likely to develop this condition if:  · You are female.  · You have any of the following injuries:  ? A wrist fracture that involves a lower arm bone (distal radius fracture).  ? Ankle dislocation or fracture.  ? A long surgery time.  ? Possible nerve injury during surgery.  What are the signs or symptoms?  Signs and symptoms in the affected hand, arm, foot, or leg are different for each stage.  Signs and symptoms of stage 1 include:  · Burning pain.  · A prickling, tingling feeling (pins and needles sensation).  · Extremely sensitive skin.  · Swelling.  · Joint stiffness.  · Warmth and redness.  · Excessive sweating.  · Hair and nail growth that is faster than normal.  Signs and symptoms of stage 2 include:  · Spreading of pain to the whole arm or leg.  · Increased skin sensitivity.  · Increased swelling and stiffness.  · Coolness of the skin.  · Blue discoloration of skin.  · Loss of skin wrinkles.  · Brittle  fingernails.  Signs and symptoms of stage 3 include:  · Pain that spreads to other areas of the body but becomes less severe.  · More stiffness, leading to loss of motion.  · Skin that is pale, dry, shiny, or tightly stretched.  How is this diagnosed?  This condition may be diagnosed based on:  · Your signs and symptoms.   · A physical exam.  There is no test to diagnose CRPS, but you may have tests:  · To check for bone changes that might indicate CRPS. These tests may include an MRI or bone scan.  · To rule out other possible causes of your symptoms.  How is this treated?  Early treatment may prevent CRPS from advancing past stage 1. There is not one treatment that works for everyone. Treatment options may include:  · Medicines, which may include:  ? NSAIDs, such as ibuprofen.  ? Steroids.  ? Blood pressure drugs.  ? Antidepressants.  ? Anti-seizure drugs.  ? Pain relievers.  · Exercise.  · Occupational therapy (OT) and physical therapy (PT).  · Biofeedback.  · Mental health counseling.  · Numbing injections.  · Spinal surgery to implant a spinal cord stimulator or a pain pump.  Follow these instructions at home:  Medicines  · Take over-the-counter and prescription medicines only as told by your health care provider.  · Do not drive or use heavy machinery while taking prescription pain medicine.  · If you are taking prescription pain medicine, take actions to prevent or treat constipation. Your health care provider may recommend that you:  ? Drink enough fluid to keep your urine pale yellow.  ? Eat foods that are high in fiber, such as fresh fruits and vegetables, whole grains, and beans.  ? Limit foods that are high in fat and processed sugars, such as fried or sweet foods.  ? Take an over-the-counter or prescription medicine for constipation.  General instructions  · Do not use any products that contain nicotine or tobacco, such as cigarettes and e-cigarettes. If you need help quitting, ask your health care  provider.  · Maintain a healthy weight.  · Return to your normal activities as told by your health care provider. Ask your health care provider what activities are safe for you.  · Follow an exercise program as directed by your health care provider.  · Keep all follow-up visits as told by your health care provider. This is important.  Contact a health care provider if:  · Your symptoms change.  · Your symptoms get worse.  · You develop anxiety or depression.  Summary  · Complex regional pain syndrome (CRPS) is a nerve disorder that causes long-term (chronic) pain, usually in a hand, arm, leg, or foot.  · CRPS usually occurs after an injury or trauma, such as a fracture or sprain.  · CRPS ranges from mild to severe. Early treatment may prevent CRPS from advancing to more severe stages.  This information is not intended to replace advice given to you by your health care provider. Make sure you discuss any questions you have with your health care provider.  Document Released: 12/08/2003 Document Revised: 11/12/2018 Document Reviewed: 11/12/2018  [employer] Interactive Patient Education © 2019 [employer]

## 2020-01-20 ENCOUNTER — OFFICE VISIT (OUTPATIENT)
Dept: FAMILY MEDICINE CLINIC | Facility: CLINIC | Age: 30
End: 2020-01-20

## 2020-01-20 VITALS
WEIGHT: 229 LBS | DIASTOLIC BLOOD PRESSURE: 70 MMHG | BODY MASS INDEX: 36.8 KG/M2 | SYSTOLIC BLOOD PRESSURE: 110 MMHG | HEART RATE: 89 BPM | TEMPERATURE: 97.7 F | HEIGHT: 66 IN | OXYGEN SATURATION: 100 %

## 2020-01-20 DIAGNOSIS — M79.18 CERVICAL MYOFASCIAL PAIN SYNDROME: Chronic | ICD-10-CM

## 2020-01-20 DIAGNOSIS — G56.41 COMPLEX REGIONAL PAIN SYNDROME TYPE 2 OF RIGHT UPPER EXTREMITY: ICD-10-CM

## 2020-01-20 PROCEDURE — 99214 OFFICE O/P EST MOD 30 MIN: CPT | Performed by: FAMILY MEDICINE

## 2020-01-20 RX ORDER — DULOXETIN HYDROCHLORIDE 30 MG/1
30 CAPSULE, DELAYED RELEASE ORAL DAILY
Qty: 30 CAPSULE | Refills: 3 | Status: SHIPPED | OUTPATIENT
Start: 2020-01-20 | End: 2020-02-07 | Stop reason: SDUPTHER

## 2020-01-20 RX ORDER — GABAPENTIN 300 MG/1
300 CAPSULE ORAL 2 TIMES DAILY
Qty: 60 CAPSULE | Refills: 1 | Status: SHIPPED | OUTPATIENT
Start: 2020-01-20 | End: 2020-03-13 | Stop reason: SDUPTHER

## 2020-01-20 NOTE — PROGRESS NOTES
Established Patient        Chief Complaint:   Chief Complaint   Patient presents with   • Follow-up     complex regional pain syndrome; difficulty sleeping due to pain        Becky Garcia is a 29 y.o. female    History of Present Illness:   Patient is here for scheduled follow-up visit concerning her complex regional pain syndrome of the right shoulder and cervical myofascial area.  She does continue to have difficulty with activities of daily living due to the discomfort, as well as impaired sleep due to fragmented qualities.  She denies any new injuries or trauma.  Denies any fever or chills.  Denies any overlying skin changes.    Subjective     The following portions of the patient's history were reviewed and updated as appropriate: allergies, current medications, past family history, past medical history, past social history, past surgical history and problem list.    Allergies   Allergen Reactions   • Zithromax [Azithromycin] Anaphylaxis       Review of Systems  1. Constitutional: Negative for fever. Negative for chills, diaphoresis, fatigue and unexpected weight change.   2. HENT: No dysphagia; no changes to vision/hearing/smell/taste; no epistaxis  3. Eyes: Negative for redness and visual disturbance.   4. Respiratory: negative for shortness of breath. Negative for chest pain . Negative for cough and chest tightness.   5. Cardiovascular: Negative for chest pain and palpitations.   6. Gastrointestinal: Negative for abdominal distention, abdominal pain and blood in stool.   7. Endocrine: Negative for cold intolerance and heat intolerance.   8. Genitourinary: Negative for difficulty urinating, dysuria and frequency.   9. Musculoskeletal: Right shoulder pain as per above.  10. Skin: Negative for color change, rash and wound.   11. Neurological: Negative for syncope, weakness and headaches.   12. Hematological: Negative for adenopathy. Does not bruise/bleed easily.   13. Psychiatric/Behavioral:  "Negative for confusion. The patient is not nervous/anxious.    Objective     Physical Exam   Vital Signs: /70   Pulse 89   Temp 97.7 °F (36.5 °C)   Ht 167.6 cm (66\")   Wt 104 kg (229 lb)   LMP 12/31/2019   SpO2 100%   BMI 36.96 kg/m²     General Appearance: alert, oriented x 3, no acute distress.  Skin: warm and dry.   HEENT: Atraumatic.  pupils round and reactive to light and accommodation, oral mucosa pink and moist.  Nares patent without epistaxis.  External auditory canals are patent tympanic membranes intact.  Neck: supple, no JVD, trachea midline.  No thyromegaly.  Spastic changes noted to the paravertebral musculature of the cervical spine, extending into the right trapezius muscle additionally.  Lungs: CTA, unlabored breathing effort.  Heart: RRR, normal S1 and S2, no S3, no rub.  Abdomen: soft, non-tender, no palpable bladder, present bowel sounds to auscultation ×4.  No guarding or rigidity.  Extremities: no clubbing, cyanosis or edema.  Normal tone and development to bilateral deltoids.  Decreased range of motion to the right shoulder, empty can and Cristina test positive for discomfort.  Pain on palpation along the supraspinatus tendon passively.  Lateral subacromial space tenderness on palpation.  Negative shrug sign, push off is normal.   strength symmetric bilaterally.  Intact supination and pronation of bilateral forearms.  Apprehension sign negative.  Haines's subjectively pos.  Without findings of winging of the scapula.  Neuro: normal speech and mental status.  Cranial nerves II through XII intact.  No anosmia. DTR 2+; proprioception intact.  No focal motor/sensory deficits.    Assessment and Plan      Assessment:   Becky was seen today for follow-up.    Diagnoses and all orders for this visit:    Cervical myofascial pain syndrome  -     gabapentin (NEURONTIN) 300 MG capsule; Take 1 capsule by mouth 2 (Two) Times a Day.  -     DULoxetine (CYMBALTA) 30 MG capsule; Take 1 capsule " by mouth Daily.  -     Ambulatory Referral to Physical Therapy Evaluate and treat    Complex regional pain syndrome type 2 of right upper extremity  -     gabapentin (NEURONTIN) 300 MG capsule; Take 1 capsule by mouth 2 (Two) Times a Day.  -     DULoxetine (CYMBALTA) 30 MG capsule; Take 1 capsule by mouth Daily.  -     Ambulatory Referral to Physical Therapy Evaluate and treat        Plan:  Inc gabapentin to 300 mg bid; inc duloxetine to 30 mg daily.    Referral to PT for eval/tx options.    Discussion Summary:    Discussed plan of care in detail with pt today; pt verb understanding and agrees.    I have reviewed and updated all copied forward information, as appropriate.  I attest to the accuracy and relevance of any unchanged information.    Follow up:  Return in about 2 months (around 3/20/2020) for Recheck, Med Change/New Meds.     There are no Patient Instructions on file for this visit.    Reuben Mercado,   01/29/20  12:37 PM    Please note that portions of this note may have been completed with a voice recognition program. Efforts were made to edit the dictations, but occasionally words are mistranscribed.

## 2020-01-28 ENCOUNTER — HOSPITAL ENCOUNTER (EMERGENCY)
Facility: HOSPITAL | Age: 30
Discharge: HOME | End: 2020-01-28
Attending: EMERGENCY MEDICINE | Admitting: EMERGENCY MEDICINE

## 2020-01-28 VITALS
DIASTOLIC BLOOD PRESSURE: 79 MMHG | RESPIRATION RATE: 18 BRPM | OXYGEN SATURATION: 100 % | HEIGHT: 66 IN | SYSTOLIC BLOOD PRESSURE: 123 MMHG | TEMPERATURE: 97.3 F | WEIGHT: 227.4 LBS | BODY MASS INDEX: 36.55 KG/M2 | HEART RATE: 66 BPM

## 2020-01-28 DIAGNOSIS — G90.511 COMPLEX REGIONAL PAIN SYNDROME TYPE 1 OF RIGHT UPPER EXTREMITY: Primary | ICD-10-CM

## 2020-01-28 PROCEDURE — 25010000002 DEXAMETHASONE PER 1 MG: Performed by: EMERGENCY MEDICINE

## 2020-01-28 PROCEDURE — 99283 EMERGENCY DEPT VISIT LOW MDM: CPT

## 2020-01-28 PROCEDURE — 25010000002 KETOROLAC TROMETHAMINE PER 15 MG: Performed by: EMERGENCY MEDICINE

## 2020-01-28 PROCEDURE — 25010000002 ORPHENADRINE CITRATE PER 60 MG: Performed by: EMERGENCY MEDICINE

## 2020-01-28 PROCEDURE — 96374 THER/PROPH/DIAG INJ IV PUSH: CPT

## 2020-01-28 PROCEDURE — 96375 TX/PRO/DX INJ NEW DRUG ADDON: CPT

## 2020-01-28 RX ORDER — DEXAMETHASONE SODIUM PHOSPHATE 10 MG/ML
10 INJECTION INTRAMUSCULAR; INTRAVENOUS ONCE
Status: COMPLETED | OUTPATIENT
Start: 2020-01-28 | End: 2020-01-28

## 2020-01-28 RX ORDER — ORPHENADRINE CITRATE 30 MG/ML
60 INJECTION INTRAMUSCULAR; INTRAVENOUS ONCE
Status: COMPLETED | OUTPATIENT
Start: 2020-01-28 | End: 2020-01-28

## 2020-01-28 RX ORDER — KETOROLAC TROMETHAMINE 30 MG/ML
30 INJECTION, SOLUTION INTRAMUSCULAR; INTRAVENOUS ONCE
Status: COMPLETED | OUTPATIENT
Start: 2020-01-28 | End: 2020-01-28

## 2020-01-28 RX ORDER — PREDNISONE 20 MG/1
TABLET ORAL
Qty: 10 TABLET | Refills: 0 | Status: SHIPPED | OUTPATIENT
Start: 2020-01-28 | End: 2020-02-07

## 2020-01-28 RX ORDER — SODIUM CHLORIDE 0.9 % (FLUSH) 0.9 %
10 SYRINGE (ML) INJECTION AS NEEDED
Status: DISCONTINUED | OUTPATIENT
Start: 2020-01-28 | End: 2020-01-28 | Stop reason: HOSPADM

## 2020-01-28 RX ORDER — HYDROCODONE BITARTRATE AND ACETAMINOPHEN 5; 325 MG/1; MG/1
1 TABLET ORAL ONCE
Status: COMPLETED | OUTPATIENT
Start: 2020-01-28 | End: 2020-01-28

## 2020-01-28 RX ORDER — CYCLOBENZAPRINE HCL 10 MG
10 TABLET ORAL 3 TIMES DAILY PRN
Qty: 15 TABLET | Refills: 0 | Status: SHIPPED | OUTPATIENT
Start: 2020-01-28 | End: 2020-03-05

## 2020-01-28 RX ADMIN — HYDROCODONE BITARTRATE AND ACETAMINOPHEN 1 TABLET: 5; 325 TABLET ORAL at 03:49

## 2020-01-28 RX ADMIN — ORPHENADRINE CITRATE 60 MG: 30 INJECTION INTRAMUSCULAR; INTRAVENOUS at 03:07

## 2020-01-28 RX ADMIN — DEXAMETHASONE SODIUM PHOSPHATE 10 MG: 10 INJECTION INTRAMUSCULAR; INTRAVENOUS at 03:12

## 2020-01-28 RX ADMIN — KETOROLAC TROMETHAMINE 30 MG: 30 INJECTION, SOLUTION INTRAMUSCULAR at 03:05

## 2020-02-07 ENCOUNTER — OFFICE VISIT (OUTPATIENT)
Dept: FAMILY MEDICINE CLINIC | Facility: CLINIC | Age: 30
End: 2020-02-07

## 2020-02-07 VITALS
TEMPERATURE: 98.5 F | WEIGHT: 229 LBS | BODY MASS INDEX: 36.8 KG/M2 | OXYGEN SATURATION: 99 % | SYSTOLIC BLOOD PRESSURE: 110 MMHG | HEART RATE: 111 BPM | HEIGHT: 66 IN | DIASTOLIC BLOOD PRESSURE: 70 MMHG

## 2020-02-07 DIAGNOSIS — Z79.899 HIGH RISK MEDICATIONS (NOT ANTICOAGULANTS) LONG-TERM USE: ICD-10-CM

## 2020-02-07 DIAGNOSIS — G56.41 COMPLEX REGIONAL PAIN SYNDROME TYPE 2 OF RIGHT UPPER EXTREMITY: ICD-10-CM

## 2020-02-07 DIAGNOSIS — M79.18 CERVICAL MYOFASCIAL PAIN SYNDROME: Primary | Chronic | ICD-10-CM

## 2020-02-07 PROBLEM — G90.511 COMPLEX REGIONAL PAIN SYNDROME TYPE 1 OF RIGHT UPPER EXTREMITY: Status: RESOLVED | Noted: 2020-02-07 | Resolved: 2020-02-07

## 2020-02-07 PROBLEM — G90.511 COMPLEX REGIONAL PAIN SYNDROME TYPE 1 OF RIGHT UPPER EXTREMITY: Status: ACTIVE | Noted: 2020-02-07

## 2020-02-07 PROCEDURE — 99213 OFFICE O/P EST LOW 20 MIN: CPT | Performed by: FAMILY MEDICINE

## 2020-02-07 RX ORDER — DULOXETIN HYDROCHLORIDE 60 MG/1
60 CAPSULE, DELAYED RELEASE ORAL DAILY
Qty: 30 CAPSULE | Refills: 3 | Status: SHIPPED | OUTPATIENT
Start: 2020-02-07 | End: 2020-06-17

## 2020-02-07 RX ORDER — AMITRIPTYLINE HYDROCHLORIDE 10 MG/1
10 TABLET, FILM COATED ORAL NIGHTLY
Qty: 30 TABLET | Refills: 3 | Status: SHIPPED | OUTPATIENT
Start: 2020-02-07 | End: 2020-06-08

## 2020-02-07 NOTE — PROGRESS NOTES
Established Patient        Chief Complaint:   Chief Complaint   Patient presents with   • Follow-up     Cervical Myofascial Pain Syndrome        Becky Garcia is a 29 y.o. female    History of Present Illness:   Here for scheduled follow-up visit concerning her cervical myofascial pain syndrome, likely representing a complex regional pain syndrome type II of the right upper extremity additionally.  Denies any new trauma, no reports of any fever or chills.  Denies any aspiration or dysphagia.  Currently tolerating treatment regimen without complication.  She does demonstrate and report limited benefit thus far.  She maintains good urine output, no reports of hematuria or dysuria.  Denies any orthopnea or dyspnea on exertion.    Subjective     The following portions of the patient's history were reviewed and updated as appropriate: allergies, current medications, past family history, past medical history, past social history, past surgical history and problem list.    Allergies   Allergen Reactions   • Zithromax [Azithromycin] Anaphylaxis       Review of Systems  1. Constitutional: Negative for fever. Negative for chills, diaphoresis, fatigue and unexpected weight change.   2. HENT: No dysphagia; no changes to vision/hearing/smell/taste; no epistaxis  3. Eyes: Negative for redness and visual disturbance.   4. Respiratory: negative for shortness of breath. Negative for chest pain . Negative for cough and chest tightness.   5. Cardiovascular: Negative for chest pain and palpitations.   6. Gastrointestinal: Negative for abdominal distention, abdominal pain and blood in stool.   7. Endocrine: Negative for cold intolerance and heat intolerance.   8. Genitourinary: Negative for difficulty urinating, dysuria and frequency.   9. Musculoskeletal: Right shoulder pain as per above.  10. Skin: Negative for color change, rash and wound.   11. Neurological: Negative for syncope, weakness and headaches.  Myalgia and  "arthralgia of the right upper extremity and cervical spine.  12. Hematological: Negative for adenopathy. Does not bruise/bleed easily.   13. Psychiatric/Behavioral: Negative for confusion. The patient is not nervous/anxious.    Objective     Physical Exam   Vital Signs: /70   Pulse 111   Temp 98.5 °F (36.9 °C)   Ht 167.6 cm (66\")   Wt 104 kg (229 lb)   SpO2 99%   BMI 36.96 kg/m²     General Appearance: alert, oriented x 3, no acute distress.  Skin: warm and dry.   HEENT: Atraumatic.  pupils round and reactive to light and accommodation, oral mucosa pink and moist.  Nares patent without epistaxis.  External auditory canals are patent tympanic membranes intact.  Neck: supple, no JVD, trachea midline.  No thyromegaly.  Spastic changes noted to the paravertebral musculature of the cervical spine, extending into the right trapezius muscle additionally.  Lungs: CTA, unlabored breathing effort.  Heart: RRR, normal S1 and S2, no S3, no rub.  Abdomen: soft, non-tender, no palpable bladder, present bowel sounds to auscultation ×4.  No guarding or rigidity.  Extremities: no clubbing, cyanosis or edema.  Normal tone and development to bilateral deltoids.  Decreased range of motion to the right shoulder, empty can and Cristina test positive for discomfort.  Pain on palpation along the supraspinatus tendon passively.  Lateral subacromial space tenderness on palpation.  Negative shrug sign, push off is normal.   strength symmetric bilaterally.  Intact supination and pronation of bilateral forearms.  Apprehension sign negative.  [place]'s subjectively pos.  Without findings of winging of the scapula.  Neuro: normal speech and mental status.  Cranial nerves II through XII intact.  No anosmia. DTR 2+; proprioception intact.  No focal motor/sensory deficits.    Assessment and Plan      Assessment:   Becky was seen today for follow-up.    Diagnoses and all orders for this visit:    Cervical myofascial pain syndrome  -  "    amitriptyline (ELAVIL) 10 MG tablet; Take 1 tablet by mouth Every Night.  -     DULoxetine (CYMBALTA) 60 MG capsule; Take 1 capsule by mouth Daily.    Complex regional pain syndrome type 2 of right upper extremity  -     amitriptyline (ELAVIL) 10 MG tablet; Take 1 tablet by mouth Every Night.  -     DULoxetine (CYMBALTA) 60 MG capsule; Take 1 capsule by mouth Daily.    High risk medications (not anticoagulants) long-term use  -     Urine Drug Screen - Urine, Clean Catch        Plan:  Inc duloxetine to 60 mg daily, taking in am.    Add amitriptyline, 10 mg, and will use at night.    Consider referral to Dr. Pritchard for eval if above changes not effective; starting PT on Monday 2/10/2020.    Seeing behavioral health therapist additionally.    Discussion Summary:    Discussed plan of care in detail with pt today; pt verb understanding and agrees.    I have reviewed and updated all copied forward information, as appropriate.  I attest to the accuracy and relevance of any unchanged information.    Follow up:  No follow-ups on file.     There are no Patient Instructions on file for this visit.    Reuben Mercado DO  02/07/20  4:44 PM    Please note that portions of this note may have been completed with a voice recognition program. Efforts were made to edit the dictations, but occasionally words are mistranscribed.

## 2020-02-10 ENCOUNTER — TREATMENT (OUTPATIENT)
Dept: PHYSICAL THERAPY | Facility: CLINIC | Age: 30
End: 2020-02-10

## 2020-02-10 DIAGNOSIS — G89.4 CHRONIC PAIN SYNDROME: Primary | ICD-10-CM

## 2020-02-10 PROCEDURE — 97162 PT EVAL MOD COMPLEX 30 MIN: CPT | Performed by: PHYSICAL THERAPIST

## 2020-02-10 NOTE — PROGRESS NOTES
Physical Therapy Initial Evaluation and Plan of Care      Patient: Becky Garcia   : 1990  Diagnosis/ICD-10 Code:  Chronic pain syndrome [G89.4]  Referring practitioner: Reuben Mercado DO    Subjective Evaluation    History of Present Illness  Date of onset: 2/10/2019  Mechanism of injury: Pt reports that she has had pain since injuring her R shoulder over a year ago. Pt reports having MRIs performed on shoulder and neck showing no abnormalities. Pt reports having EMG with no shown abnormalities. Pt states that she has burning and stabbing pain in legs and feet that becomes worse with touch and pressure. Pt states that she is worried about her diagnosis because she is so young and does not know what to expect.       Patient Occupation: unemployed Pain  Current pain ratin  At best pain ratin  At worst pain ratin  Quality: burning, knife-like and needle-like  Aggravating factors: sleeping, movement, lifting and ambulation  Progression: no change    Social Support  Lives in: one-story house  Lives with: parents    Hand dominance: right    Diagnostic Tests  X-ray: normal  MRI studies: normal    Treatments  Previous treatment: physical therapy and medication  Current treatment: medication  Patient Goals  Patient goals for therapy: decreased pain and increased strength             Objective       Neurological Testing     Sensation   Cervical/Thoracic   Left   Hypersensation: light touch    Right   Hypersensation: light touch    Lumbar   Left   Hypersensation: light touch    Right   Hypersensation: light touch    Reflexes   Left   Biceps (C5/C6): normal (2+)  Triceps (C7): normal (2+)  Patellar (L4): normal (2+)  Achilles (S1): normal (2+)    Right   Biceps (C5/C6): normal (2+)  Triceps (C7): normal (2+)  Patellar (L4): normal (2+)  Achilles (S1): normal (2+)    Active Range of Motion   Cervical/Thoracic Spine   Cervical    Flexion: WFL  Extension: WFL  Left lateral flexion: WFL  Right  lateral flexion: WFL  Left rotation: WFL  Right rotation: WFL         Assessment & Plan     Assessment  Impairments: abnormal or restricted ROM, activity intolerance, impaired physical strength, lacks appropriate home exercise program and pain with function  Assessment details: Pt is a 29 year old female that presents to PT with chronic pain of over 1 year. Pt with symmetrical DTRs although both LEs and UEs very sensitive to light touch. Pt with tenderness throughout the LE muscles and hips. Pt with no ROM restrictions and is flexible but has pain with function. Pt educated on benefits of low level exercise for chronic pain and provided with gentle, full body movements to help promote mobility.   Prognosis: good  Functional Limitations: carrying objects, lifting, sleeping, walking, uncomfortable because of pain, sitting and stooping  Goals  Plan Goals: Short Term Goals (2 weeks)  1. Pt will demonstrate independence with HEP  2. Pt will be able to tolerate 30 mins of light activities in the clinic without increased pain or soreness after session.     Long Term Goals (6 weeks)  1. Pt will be independent with light exercise routine tailored to fit her needs and improve activity tolerance and energy.  2. Pt will be able to sleep 5-6 hours uninterrupted by pain.   3. Pt will be able to walk for 20 mins without increased pain or rest breaks.    Plan  Therapy options: will be seen for skilled physical therapy services  Planned modality interventions: thermotherapy (hydrocollator packs) and TENS  Planned therapy interventions: abdominal trunk stabilization, strengthening, motor coordination training, therapeutic activities, home exercise program, stretching, body mechanics training and flexibility  Frequency: 2x week  Treatment plan discussed with: patient  Plan details: Pt to be seen 2-3x per week for 6-8 weeks        Manual Therapy:         mins  46535;  Therapeutic Exercise:         mins  30756;     Neuromuscular Melecio:         mins  14003;    Therapeutic Activity:          mins  41872;     Gait Training:           mins  70991;     Ultrasound:          mins  48612;    Electrical Stimulation:         mins  26593 ( );  Dry Needling          mins self-pay    Timed Treatment:      mins   Total Treatment:     41   mins    PT SIGNATURE: Mitul Cope, PT   DATE TREATMENT INITIATED: 2/10/2020    Initial Certification  Certification Period: 5/10/2020  I certify that the therapy services are furnished while this patient is under my care.  The services outlined above are required by this patient, and will be reviewed every 90 days.     PHYSICIAN: Reuben Mercado, DO      DATE:     Please sign and return via fax to [phone].. Thank you, [facility].

## 2020-02-18 ENCOUNTER — TREATMENT (OUTPATIENT)
Dept: PHYSICAL THERAPY | Facility: CLINIC | Age: 30
End: 2020-02-18

## 2020-02-18 DIAGNOSIS — G89.4 CHRONIC PAIN SYNDROME: Primary | ICD-10-CM

## 2020-02-18 PROCEDURE — 97140 MANUAL THERAPY 1/> REGIONS: CPT | Performed by: PHYSICAL THERAPIST

## 2020-02-18 PROCEDURE — 97110 THERAPEUTIC EXERCISES: CPT | Performed by: PHYSICAL THERAPIST

## 2020-02-18 NOTE — PROGRESS NOTES
Physical Therapy Daily Progress Note        Becky Garcia reports 5/10 pain today at rest.  Pt reports that she was hurting the day after her initial visit although since then has done well with the HEP daily. Pt reports that she hurts most at night when she is more aware of the pain. Pt states that she is going to get access to a warm pool for some exercise.         Objective Pt present to PT today with no distress at rest.     Pt had fatigue with activities today although did not have increased pain.     Pt was guarded against PT stretching hips and legs although had no problem with child pose.       See Exercise, Manual, and Modality Logs for complete treatment.     Assessment/Plan  Pt doing well with activities at home although still has pain in the legs and feet. Pt to continue with HEP and add pool activities to help improve activity tolerance and improve chronic pain.       Progress per Plan of Care  Progress as tolerated           Manual Therapy:    11     mins  63865;  Therapeutic Exercise:    18     mins  27489;     Neuromuscular Melecio:        mins  45496;    Therapeutic Activity:          mins  35614;     Gait Training:        ___  mins  45079;     Ultrasound:          mins  76304;    Electrical Stimulation:         mins  18253 ( );  Dry Needling          mins self-pay    Timed Treatment:   29   mins   Total Treatment:     48   mins    Mitul Cope, PT  Physical Therapist

## 2020-02-20 ENCOUNTER — TREATMENT (OUTPATIENT)
Dept: PHYSICAL THERAPY | Facility: CLINIC | Age: 30
End: 2020-02-20

## 2020-02-20 DIAGNOSIS — G89.4 CHRONIC PAIN SYNDROME: Primary | ICD-10-CM

## 2020-02-20 PROCEDURE — 97110 THERAPEUTIC EXERCISES: CPT | Performed by: PHYSICAL THERAPIST

## 2020-02-20 PROCEDURE — 97140 MANUAL THERAPY 1/> REGIONS: CPT | Performed by: PHYSICAL THERAPIST

## 2020-02-20 NOTE — PROGRESS NOTES
Physical Therapy Daily Progress Note        Becky Garcia reports 5/10 pain today at rest.  Pt reports that she was sore after last session although did not have a flare up of pain which is good. Pt states that she feels like her pain is leveling off and does not get much worse than a 5/10 although does not get much better.         Objective Pt present to PT today with no distress at rest.     Pt tolerated exercises and activities well today without increased pain following session.       See Exercise, Manual, and Modality Logs for complete treatment.     Assessment/Plan  Pt continues to do well with light full body exercises to help improve activity tolerance. Pt has improved energy today and was able to increase activity. Pt would benefit from PT to help continue to encourage activity and improve function with chronic pain.       Progress per Plan of Care  Progress as tolerated           Manual Therapy:    10     mins  56662;  Therapeutic Exercise:    21     mins  97548;     Neuromuscular Melecio:        mins  75834;    Therapeutic Activity:          mins  46395;     Gait Training:        ___  mins  86991;     Ultrasound:          mins  99955;    Electrical Stimulation:         mins  42404 ( );  Dry Needling          mins self-pay    Timed Treatment:   31   mins   Total Treatment:     52   mins    Mitul Cope, PT  Physical Therapist

## 2020-02-25 ENCOUNTER — TREATMENT (OUTPATIENT)
Dept: PHYSICAL THERAPY | Facility: CLINIC | Age: 30
End: 2020-02-25

## 2020-02-25 DIAGNOSIS — G89.4 CHRONIC PAIN SYNDROME: Primary | ICD-10-CM

## 2020-02-25 PROCEDURE — 97110 THERAPEUTIC EXERCISES: CPT | Performed by: PHYSICAL THERAPIST

## 2020-02-25 NOTE — PROGRESS NOTES
Physical Therapy Daily Progress Note        Becky Garcia reports 7/10 pain today at rest.  Pt reports that she shut her arm in the door after leaving PT last week and has had a flare up of the whole R side with worse pain. Pt states that she has been walking and doing her HEP which helps reduce pain for awhile.         Objective Pt present to PT today with no distress at rest.     Pt with increased guarding against touch and manual stretching on the R side of her body today.     Pt with increased tenderness over the R scapula.       See Exercise, Manual, and Modality Logs for complete treatment.     Assessment/Plan  Pt continues to have chronic pain although light exercises seems to be beneficial. Pt is going this week to sign up for a [employer] membership to get access to a pool. Pt will follow up later this week to continue with care.       Progress per Plan of Care  Assess R sided pain.            Manual Therapy:         mins  60365;  Therapeutic Exercise:    18     mins  04373;     Neuromuscular Melecio:        mins  43287;    Therapeutic Activity:          mins  11242;     Gait Training:        ___  mins  29781;     Ultrasound:          mins  46018;    Electrical Stimulation:         mins  32354 ( );  Dry Needling          mins self-pay    Timed Treatment:   18   mins   Total Treatment:     50   mins    Mitul Cope, PT  Physical Therapist

## 2020-02-28 ENCOUNTER — TREATMENT (OUTPATIENT)
Dept: PHYSICAL THERAPY | Facility: CLINIC | Age: 30
End: 2020-02-28

## 2020-02-28 DIAGNOSIS — G89.4 CHRONIC PAIN SYNDROME: Primary | ICD-10-CM

## 2020-02-28 PROCEDURE — 97110 THERAPEUTIC EXERCISES: CPT | Performed by: PHYSICAL THERAPIST

## 2020-02-28 PROCEDURE — 97140 MANUAL THERAPY 1/> REGIONS: CPT | Performed by: PHYSICAL THERAPIST

## 2020-02-28 NOTE — PROGRESS NOTES
Physical Therapy Daily Progress Note        Becky Garcia reports that she is feeling better today with less intense pain and some more energy. Pt reports that she has signed up for the pool but has not been in yet. Pt states that she wants to get blood work done when she returns to her PCP. Pt reports that her L hip, knee, and ankle have been bothering her a lot at rest and with activity.         Objective Pt present to PT today with no distress at rest.     Pt tolerated exercises well today without increased pain.    Pt with tenderness in the anterior hips worse on the L side than R.     Pt with tenderness over the lumbar spine paraspinals and spinous processes with palpation and PAs.       See Exercise, Manual, and Modality Logs for complete treatment.     Assessment/Plan  Pt continues to move more comfortably and is staying active with HEP and walking. Pt to begin pool workouts this coming week. Pt to follow up next week to see how she has responded to pool workouts and activities at home.       Progress per Plan of Care  Progress as tolertated           Manual Therapy:    11     mins  31511;  Therapeutic Exercise:    36     mins  09954;     Neuromuscular Melecio:        mins  31415;    Therapeutic Activity:          mins  69069;     Gait Training:        ___  mins  51579;     Ultrasound:          mins  53694;    Electrical Stimulation:         mins  89013 ( );  Dry Needling          mins self-pay    Timed Treatment:   47   mins   Total Treatment:     51   mins    Mitul Cope, PT  Physical Therapist

## 2020-03-03 ENCOUNTER — TREATMENT (OUTPATIENT)
Dept: PHYSICAL THERAPY | Facility: CLINIC | Age: 30
End: 2020-03-03

## 2020-03-03 DIAGNOSIS — G89.4 CHRONIC PAIN SYNDROME: Primary | ICD-10-CM

## 2020-03-03 PROCEDURE — 97110 THERAPEUTIC EXERCISES: CPT | Performed by: PHYSICAL THERAPIST

## 2020-03-03 PROCEDURE — 97140 MANUAL THERAPY 1/> REGIONS: CPT | Performed by: PHYSICAL THERAPIST

## 2020-03-03 NOTE — PROGRESS NOTES
Physical Therapy Daily Progress Note        Becky Garcia reports she went to a pool exercise class last week and felt very good afterwards although she was tired. Pt states she slept well for the first time in months after her class. Pt reports that she was tired but that it felt good to move and exercise.         Objective Pt present to PT today with no distress at rest.     Pt has increased tenderness in the lumbar paraspinals above the PSIS bilaterally.     Pt tolerated exercises well today with no increased pain with activities.       See Exercise, Manual, and Modality Logs for complete treatment.     Assessment/Plan  Pt continues to do well with light activities in the PT clinic and responded well to her first pool experience. Pt to continue with pool activities and PT to help improve function, activity tolerance, and overall strength to improve pain free daily activities.       Progress per Plan of Care  Progress as tolerated           Manual Therapy:    11     mins  10438;  Therapeutic Exercise:    14     mins  62240;     Neuromuscular Melecio:        mins  84076;    Therapeutic Activity:          mins  09279;     Gait Training:        ___  mins  04076;     Ultrasound:          mins  20481;    Electrical Stimulation:         mins  58893 ( );  Dry Needling          mins self-pay    Timed Treatment:   25   mins   Total Treatment:     52   mins    Mitul Cope, PT  Physical Therapist

## 2020-03-05 ENCOUNTER — OFFICE VISIT (OUTPATIENT)
Dept: FAMILY MEDICINE CLINIC | Facility: CLINIC | Age: 30
End: 2020-03-05

## 2020-03-05 VITALS
DIASTOLIC BLOOD PRESSURE: 68 MMHG | HEART RATE: 97 BPM | OXYGEN SATURATION: 99 % | HEIGHT: 66 IN | WEIGHT: 224 LBS | BODY MASS INDEX: 36 KG/M2 | TEMPERATURE: 97.2 F | SYSTOLIC BLOOD PRESSURE: 120 MMHG

## 2020-03-05 DIAGNOSIS — M25.50 POLYARTHRALGIA: ICD-10-CM

## 2020-03-05 DIAGNOSIS — G56.41 COMPLEX REGIONAL PAIN SYNDROME TYPE 2 OF RIGHT UPPER EXTREMITY: Primary | ICD-10-CM

## 2020-03-05 DIAGNOSIS — M79.10 MYALGIA: ICD-10-CM

## 2020-03-05 DIAGNOSIS — M79.18 CERVICAL MYOFASCIAL PAIN SYNDROME: Chronic | ICD-10-CM

## 2020-03-05 PROCEDURE — 99214 OFFICE O/P EST MOD 30 MIN: CPT | Performed by: FAMILY MEDICINE

## 2020-03-05 NOTE — PROGRESS NOTES
Established Patient        Chief Complaint:   Chief Complaint   Patient presents with   • Follow-up     CRPS; symptoms have improved slightly        Becky Garcia is a 29 y.o. female    History of Present Illness:   Presents for follow-up of her complex regional pain syndrome of the right upper extremity, as well as associated cervical myofascial pain syndrome.  Patient states her mood has been significantly improved since last visit.  Denies any new trauma.  She is currently utilizing physical therapy program.  She reports progressive improvement to her symptoms.  She does admit to some myalgias and arthralgias of other extremities in addition to those of the right upper extremity.  She denies any new trauma.  Denies any fever or chills.  No rashes of note.  Does admit to some gravity dependent edematous changes to the lower extremities, improved upon awakening in the morning.  She continues to be seen by behavioral health additionally.  Denies any suicidal or homicidal ideation.    Subjective     The following portions of the patient's history were reviewed and updated as appropriate: allergies, current medications, past family history, past medical history, past social history, past surgical history and problem list.    Allergies   Allergen Reactions   • Zithromax [Azithromycin] Anaphylaxis       Review of Systems  1. Constitutional: Negative for fever. Negative for chills, diaphoresis, fatigue and unexpected weight change.   2. HENT: No dysphagia; no changes to vision/hearing/smell/taste; no epistaxis  3. Eyes: Negative for redness and visual disturbance.   4. Respiratory: negative for shortness of breath. Negative for chest pain . Negative for cough and chest tightness.   5. Cardiovascular: Negative for chest pain and palpitations.   6. Gastrointestinal: Negative for abdominal distention, abdominal pain and blood in stool.   7. Endocrine: Negative for cold intolerance and heat intolerance.  "  8. Genitourinary: Negative for difficulty urinating, dysuria and frequency.   9. Musculoskeletal: Right shoulder pain as per above.  Myalgias/polyarthralgias as above.  10. Skin: Negative for color change, rash and wound.   11. Neurological: Negative for syncope, weakness and headaches.  Myalgia and arthralgia of the right upper extremity and cervical spine.  12. Hematological: Negative for adenopathy. Does not bruise/bleed easily.   13. Psychiatric/Behavioral: Negative for confusion. The patient is not nervous/anxious.    Objective     Physical Exam   Vital Signs: /68   Pulse 97   Temp 97.2 °F (36.2 °C)   Ht 167.6 cm (66\")   Wt 102 kg (224 lb)   SpO2 99%   BMI 36.15 kg/m²     General Appearance: alert, oriented x 3, no acute distress.  Skin: warm and dry.   HEENT: Atraumatic.  pupils round and reactive to light and accommodation, oral mucosa pink and moist.  Nares patent without epistaxis.  External auditory canals are patent tympanic membranes intact.  Neck: supple, no JVD, trachea midline.  No thyromegaly.  Spastic changes noted to the paravertebral musculature of the cervical spine, extending into the right trapezius muscle additionally.  Lungs: CTA, unlabored breathing effort.  Heart: RRR, normal S1 and S2, no S3, no rub.  Abdomen: soft, non-tender, no palpable bladder, present bowel sounds to auscultation ×4.  No guarding or rigidity.  Extremities: no clubbing, cyanosis or edema.  Normal tone and development to bilateral deltoids.  Decreased range of motion to the right shoulder, empty can and Cristina test positive for discomfort.  Pain on palpation along the supraspinatus tendon passively.  Lateral subacromial space tenderness on palpation.  Negative shrug sign, push off is normal.   strength symmetric bilaterally.  Intact supination and pronation of bilateral forearms.  Apprehension sign negative.  [place]'s subjectively pos.  Without findings of winging of the scapula.  Neuro: normal " speech and mental status.  Cranial nerves II through XII intact.  No anosmia. DTR 2+; proprioception intact.  No focal motor/sensory deficits.    Assessment and Plan      Assessment:   Becky was seen today for follow-up.    Diagnoses and all orders for this visit:    Complex regional pain syndrome type 2 of right upper extremity  -     Uric acid  -     Antistreptolysin O screen  -     Rheumatoid factor  -     C-reactive protein  -     WILMAR    Polyarthralgia  -     Uric acid  -     Antistreptolysin O screen  -     Rheumatoid factor  -     C-reactive protein  -     WILMAR    Myalgia  -     Uric acid  -     Antistreptolysin O screen  -     Rheumatoid factor  -     C-reactive protein  -     WILMAR    Cervical myofascial pain syndrome        Plan:  Rheumatoid panel will be ordered for further evaluation of her polymyalgia/polyarthralgias of unknown etiology.    Continue current treatment regimen including amitriptyline/duloxetine/gabapentin.  Continue formal therapy program additionally.    I have encouraged patient to continue with her behavioral health scheduled visits additionally.    Very pleased with patient's dietary/lifestyle/exercise modifications which have yielded significant weight transition to an overall leaner body mass.  Encourage patient to maintain appropriate hydration status.    Discussion Summary:    Discussed plan of care in detail with pt today; pt verb understanding and agrees.    I have reviewed and updated all copied forward information, as appropriate.  I attest to the accuracy and relevance of any unchanged information.    Follow up:  No follow-ups on file.     There are no Patient Instructions on file for this visit.    Reuben Mercado DO  03/05/20  11:54 AM    Please note that portions of this note may have been completed with a voice recognition program. Efforts were made to edit the dictations, but occasionally words are mistranscribed.

## 2020-03-06 LAB
ANA SER QL: NEGATIVE
CRP SERPL-MCNC: 0.61 MG/DL (ref 0–0.5)
URATE SERPL-MCNC: 5.7 MG/DL (ref 2.4–5.7)

## 2020-03-09 ENCOUNTER — TELEPHONE (OUTPATIENT)
Dept: FAMILY MEDICINE CLINIC | Facility: CLINIC | Age: 30
End: 2020-03-09

## 2020-03-09 NOTE — TELEPHONE ENCOUNTER
We are still waiting on a couple of her labs, with only a slight abnormality noted to her CRP level, of no clinical significance.

## 2020-03-09 NOTE — TELEPHONE ENCOUNTER
Rheumatoid factor and Antistreptolysin were added. (They were ordered as [employer] so Sneha could not see them.) Please let me know when you have reviewed the other labs.  Thanks

## 2020-03-10 ENCOUNTER — TREATMENT (OUTPATIENT)
Dept: PHYSICAL THERAPY | Facility: CLINIC | Age: 30
End: 2020-03-10

## 2020-03-10 DIAGNOSIS — G89.4 CHRONIC PAIN SYNDROME: Primary | ICD-10-CM

## 2020-03-10 PROCEDURE — 97110 THERAPEUTIC EXERCISES: CPT | Performed by: PHYSICAL THERAPIST

## 2020-03-10 PROCEDURE — 97140 MANUAL THERAPY 1/> REGIONS: CPT | Performed by: PHYSICAL THERAPIST

## 2020-03-10 NOTE — PROGRESS NOTES
Physical Therapy Daily Progress Note        Becky Garcia reports that she continues to go to the pool classes which have not made her as tired although she has not been sleeping as well as a result. Pt reports that her doctor took blood work although no word yet on the results. Pt states that she hit her leg with the car door which has made her pain flare up today.         Objective Pt present to PT today with no distress at rest.      Pt able to complete all normal activities today without increased pain or excessive fatigue.     Pt lumbar spine very tender today with palpation.       See Exercise, Manual, and Modality Logs for complete treatment.     Assessment/Plan  Pt continues to participate in pool exercise classes which may or may not be beneficial as results after sessions have been mixed. Pt to continue with PT to help encourage light exercise and help manage pain.       Progress per Plan of Care  Progress as tolerated           Manual Therapy:    10     mins  99197;  Therapeutic Exercise:    18     mins  98984;     Neuromuscular Melecio:        mins  88146;    Therapeutic Activity:          mins  30535;     Gait Training:        ___  mins  32623;     Ultrasound:          mins  15775;    Electrical Stimulation:         mins  19824 ( );  Dry Needling          mins self-pay    Timed Treatment:   28   mins   Total Treatment:     57   mins    Mitul Cope, PT  Physical Therapist

## 2020-03-11 LAB
ASO AB SERPL-ACNC: 26.2 IU/ML (ref 0–200)
RHEUMATOID FACT SERPL-ACNC: <10 IU/ML (ref 0–13.9)
WRITTEN AUTHORIZATION: NORMAL

## 2020-03-12 ENCOUNTER — TREATMENT (OUTPATIENT)
Dept: PHYSICAL THERAPY | Facility: CLINIC | Age: 30
End: 2020-03-12

## 2020-03-12 DIAGNOSIS — M79.10 MYALGIA: ICD-10-CM

## 2020-03-12 DIAGNOSIS — G89.4 CHRONIC PAIN SYNDROME: Primary | ICD-10-CM

## 2020-03-12 DIAGNOSIS — M25.50 POLYARTHRALGIA: ICD-10-CM

## 2020-03-12 DIAGNOSIS — G56.41 COMPLEX REGIONAL PAIN SYNDROME TYPE 2 OF RIGHT UPPER EXTREMITY: Primary | ICD-10-CM

## 2020-03-12 DIAGNOSIS — M79.18 CERVICAL MYOFASCIAL PAIN SYNDROME: Chronic | ICD-10-CM

## 2020-03-12 PROCEDURE — 97110 THERAPEUTIC EXERCISES: CPT | Performed by: PHYSICAL THERAPIST

## 2020-03-12 PROCEDURE — 97140 MANUAL THERAPY 1/> REGIONS: CPT | Performed by: PHYSICAL THERAPIST

## 2020-03-12 NOTE — TELEPHONE ENCOUNTER
Spoke with patient regarding her negative labs.  She said she is still interested in seeing a Rheumatologist if you are agreeable. Either way she wants to know what the next step is.  Please advise. Thanks

## 2020-03-12 NOTE — PROGRESS NOTES
Physical Therapy Daily Progress Note        Becky Garcia reports that she is doing better today although had some trouble getting to sleep last night due to discomfort. Pt states that she has been elevating feet at night due to swelling although she is not comfortable on her back for long.         Objective Pt present to PT today with no distress at rest.     Pt comfortable with legs elevated in hooklying position and may benefit from this position to help rest at night.     Pt tolerated exercises well today without increased pain or discomfort.       See Exercise, Manual, and Modality Logs for complete treatment.     Assessment/Plan  Pt continues to do well with PT and light exercise. Pt is getting better rest this week from exercise classes in the pool and will continue with PT to help guide pt through light activities to help improve chronic pain.       Progress per Plan of Care  Assess reaction to workout           Manual Therapy:    10     mins  27799;  Therapeutic Exercise:    17     mins  10974;     Neuromuscular Melecio:        mins  52781;    Therapeutic Activity:          mins  93083;     Gait Training:        ___  mins  43931;     Ultrasound:          mins  33014;    Electrical Stimulation:         mins  02603 ( );  Dry Needling          mins self-pay    Timed Treatment:   27   mins   Total Treatment:     58   mins    Mitul Cope, PT  Physical Therapist

## 2020-03-12 NOTE — TELEPHONE ENCOUNTER
Pt is calling  States she had a missed called thinks its about  Lab results     Please advise and give  Call [phone] (H)

## 2020-03-13 DIAGNOSIS — M79.18 CERVICAL MYOFASCIAL PAIN SYNDROME: Chronic | ICD-10-CM

## 2020-03-13 DIAGNOSIS — G56.41 COMPLEX REGIONAL PAIN SYNDROME TYPE 2 OF RIGHT UPPER EXTREMITY: ICD-10-CM

## 2020-03-13 RX ORDER — GABAPENTIN 300 MG/1
300 CAPSULE ORAL 2 TIMES DAILY
Qty: 60 CAPSULE | Refills: 1 | Status: SHIPPED | OUTPATIENT
Start: 2020-03-13 | End: 2020-05-27

## 2020-03-13 NOTE — TELEPHONE ENCOUNTER
Patient called requesting refills on Gabapentin.     [facility] 1/20/2020    Please obtain Christ.

## 2020-03-17 ENCOUNTER — TREATMENT (OUTPATIENT)
Dept: PHYSICAL THERAPY | Facility: CLINIC | Age: 30
End: 2020-03-17

## 2020-03-17 DIAGNOSIS — G56.41 COMPLEX REGIONAL PAIN SYNDROME TYPE 2 OF RIGHT UPPER EXTREMITY: ICD-10-CM

## 2020-03-17 DIAGNOSIS — G89.4 CHRONIC PAIN SYNDROME: Primary | ICD-10-CM

## 2020-03-17 DIAGNOSIS — M79.18 CERVICAL MYOFASCIAL PAIN SYNDROME: Chronic | ICD-10-CM

## 2020-03-17 PROCEDURE — 97110 THERAPEUTIC EXERCISES: CPT | Performed by: PHYSICAL THERAPIST

## 2020-03-17 RX ORDER — GABAPENTIN 300 MG/1
300 CAPSULE ORAL 2 TIMES DAILY
Qty: 60 CAPSULE | Refills: 1 | OUTPATIENT
Start: 2020-03-17

## 2020-03-17 NOTE — PROGRESS NOTES
Physical Therapy Daily Progress Note        Becky Garcia reports that she is feeling pretty good today. Pt states that she has been unable to get in the pool due to gym being closed down. Pt reports that her rheumatoid factor was negative in the blood work although she still plans to see a rheumatologist.         Objective Pt present to PT today with no distress at rest.     Pt unable to stand out of a chair without UE assistance today due to soreness in the knees.     Pt with soreness in the hip joint with manual stretching today.       See Exercise, Manual, and Modality Logs for complete treatment.     Assessment/Plan  Pt continues to have fatigue with activities and daily living although is having less pain throughout her day overall. Pt with noted decreased LE strength today probably due to inactivity. Pt would benefit from PT to continue to promote light exercise to help control chronic pain.       Progress per Plan of Care  Progress as tolerated           Manual Therapy:    4     mins  77326;  Therapeutic Exercise:    47     mins  73309;     Neuromuscular Melecio:        mins  92236;    Therapeutic Activity:          mins  38375;     Gait Training:        ___  mins  02066;     Ultrasound:          mins  23085;    Electrical Stimulation:        mins  08383 ( );  Dry Needling          mins self-pay    Timed Treatment:   51   mins   Total Treatment:     52   mins    Mitul Cope, PT  Physical Therapist

## 2020-03-19 ENCOUNTER — TREATMENT (OUTPATIENT)
Dept: PHYSICAL THERAPY | Facility: CLINIC | Age: 30
End: 2020-03-19

## 2020-03-19 DIAGNOSIS — G89.4 CHRONIC PAIN SYNDROME: Primary | ICD-10-CM

## 2020-03-19 PROCEDURE — 97110 THERAPEUTIC EXERCISES: CPT | Performed by: PHYSICAL THERAPIST

## 2020-03-19 NOTE — PROGRESS NOTES
Physical Therapy Daily Progress Note        Becky Garcia reports that she feels a little worse than last visit with less energy and soreness in the knees today. Pt states that she is still feeling better overall.         Objective Pt present to PT today with no distress at rest.     Pt tolerated exercises well today without increased pain with activities.       See Exercise, Manual, and Modality Logs for complete treatment.     Assessment/Plan  Pt continues to have trouble accessing pool and workout facilities due to COVID-19 precautions. Pt to continue with light exercise as she can and follow up next week if clinic stays open.       Progress per Plan of Care   Progress as tolerated           Manual Therapy:         mins  87173;  Therapeutic Exercise:    30     mins  18376;     Neuromuscular Melecio:        mins  01293;    Therapeutic Activity:          mins  21608;     Gait Training:        ___  mins  13638;     Ultrasound:          mins  47836;    Electrical Stimulation:         mins  71731 ( );  Dry Needling          mins self-pay    Timed Treatment:   30   mins   Total Treatment:     56   mins    Mitul Cope, PT  Physical Therapist

## 2020-05-25 DIAGNOSIS — M79.18 CERVICAL MYOFASCIAL PAIN SYNDROME: Chronic | ICD-10-CM

## 2020-05-25 DIAGNOSIS — G56.41 COMPLEX REGIONAL PAIN SYNDROME TYPE 2 OF RIGHT UPPER EXTREMITY: ICD-10-CM

## 2020-05-27 RX ORDER — GABAPENTIN 300 MG/1
CAPSULE ORAL
Qty: 60 CAPSULE | Refills: 1 | Status: SHIPPED | OUTPATIENT
Start: 2020-05-27 | End: 2020-06-03 | Stop reason: SDUPTHER

## 2020-06-03 ENCOUNTER — OFFICE VISIT (OUTPATIENT)
Dept: FAMILY MEDICINE CLINIC | Facility: CLINIC | Age: 30
End: 2020-06-03

## 2020-06-03 DIAGNOSIS — M06.9 RHEUMATOID ARTHRITIS INVOLVING MULTIPLE SITES, UNSPECIFIED RHEUMATOID FACTOR PRESENCE: ICD-10-CM

## 2020-06-03 DIAGNOSIS — M79.18 CERVICAL MYOFASCIAL PAIN SYNDROME: Chronic | ICD-10-CM

## 2020-06-03 DIAGNOSIS — F39 MOOD DISORDER (HCC): ICD-10-CM

## 2020-06-03 DIAGNOSIS — G56.41 COMPLEX REGIONAL PAIN SYNDROME TYPE 2 OF RIGHT UPPER EXTREMITY: Primary | ICD-10-CM

## 2020-06-03 PROCEDURE — 99443 PR PHYS/QHP TELEPHONE EVALUATION 21-30 MIN: CPT | Performed by: FAMILY MEDICINE

## 2020-06-03 RX ORDER — PREDNISONE 10 MG/1
TABLET ORAL
COMMUNITY
Start: 2020-06-01 | End: 2022-05-12

## 2020-06-03 RX ORDER — FOLIC ACID 1 MG/1
TABLET ORAL
COMMUNITY
Start: 2020-06-01 | End: 2022-01-31 | Stop reason: ALTCHOICE

## 2020-06-03 RX ORDER — GABAPENTIN 100 MG/1
100 CAPSULE ORAL 2 TIMES DAILY
Qty: 60 CAPSULE | Refills: 0 | Status: SHIPPED | OUTPATIENT
Start: 2020-06-03 | End: 2020-07-02 | Stop reason: SDUPTHER

## 2020-06-03 NOTE — PROGRESS NOTES
You have chosen to receive care through a telephone visit. Do you consent to use a telephone visit for your medical care today? Yes            Established Patient        Chief Complaint:   Chief Complaint   Patient presents with   • Med Management     pt wants advise on discontinuing any medications prior to visit with rheumatologist        Becky Jose Martin Garcia is a 29 y.o. female    History of Present Illness:   Here for scheduled telehealth visit due to the current viral epidemic and healthcare guidelines, concerning her known complex regional pain syndrome of the right upper extremity, as well as mood disorder.  Patient has been diagnosed with rheumatoid arthritis of multiple sites.  She denies any suicidal homicidal ideation.  She is very pleased with her response to medications thus far, she is interested in potential decrease in the dosing of her gabapentin.  She continues to tolerate her methotrexate dosing.  She denies any fever, chills or night sweats.  Tolerating all dietary intake, without aspiration or dysphagia.  Maintains good urine output, denies any hematuria; no reports of bright red blood or black tarry stools.    Subjective     The following portions of the patient's history were reviewed and updated as appropriate: allergies, current medications, past family history, past medical history, past social history, past surgical history and problem list.    Allergies   Allergen Reactions   • Zithromax [Azithromycin] Anaphylaxis   • Mucinex Dm  [Dextromethorphan-Guaifenesin] Hives       Review of Systems  1. Constitutional: Negative for fever. Negative for chills, diaphoresis, fatigue and unexpected weight change.   2. HENT: No dysphagia; no changes to vision/hearing/smell/taste; no epistaxis  3. Eyes: Negative for redness and visual disturbance.   4. Respiratory: negative for shortness of breath. Negative for chest pain . Negative for cough and chest tightness.   5. Cardiovascular: Negative for chest  pain and palpitations.   6. Gastrointestinal: Negative for abdominal distention, abdominal pain and blood in stool.   7. Endocrine: Negative for cold intolerance and heat intolerance.   8. Genitourinary: Negative for difficulty urinating, dysuria and frequency.   9. Musculoskeletal: Chronic arthralgias, back pain and myalgias.   10. Skin: Negative for color change, rash and wound.   11. Neurological: Negative for syncope, weakness and headaches.   12. Hematological: Negative for adenopathy. Does not bruise/bleed easily.   13. Psychiatric/Behavioral: Negative for confusion. The patient is not nervous/anxious.    Objective     Physical Exam   Vital Signs: There were no vitals taken for this visit.    General Appearance: alert, oriented x 3, no acute distress.  Skin: warm and dry.  Denies jaundice  HEENT: Atraumatic.  Oral mucosa pink and moist.  Nares patent without epistaxis.  External auditory canals are patent.  Neck: Without stridor  Lungs: unlabored breathing effort.  Abdomen: soft, non-tender patient mediated self exam.  Extremities: no clubbing, cyanosis or edema.  Symmetric muscle strength and development  Neuro: normal speech and mental status.      Assessment and Plan      Assessment:   Becky was seen today for med management.    Diagnoses and all orders for this visit:    Complex regional pain syndrome type 2 of right upper extremity  -     gabapentin (NEURONTIN) 100 MG capsule; Take 1 capsule by mouth 2 (Two) Times a Day.    Cervical myofascial pain syndrome  -     gabapentin (NEURONTIN) 100 MG capsule; Take 1 capsule by mouth 2 (Two) Times a Day.    Rheumatoid arthritis involving multiple sites, unspecified rheumatoid factor presence (CMS/[facility])    Mood disorder (CMS/[facility])        Plan:  Patient is agreeable to a trial of decreased dosing of gabapentin.  Will be decreased to 100 mg twice daily dosing for the next 4 weeks.  Should she develop any ill effects to the dose adjustment she is notify the office  immediately for potential change to treatment regimen.    Next planned phase would be trial of discontinuation of gabapentin, although we may need to decrease to once daily dosing prior to that.  We will reevaluate at next follow-up visit.  Consideration of discontinuing amitriptyline in the near future if able additionally.    Patient has felt quite well with respect to mood stability since initiation of Cymbalta.  Plan continuation of current dosing.    I have advised patient to keep her scheduled follow-up appointment with rheumatology in early July.  She has tolerated current dosing of methotrexate well.  She is to take folic acid on all days except the day in which she takes her methotrexate.  She does have prednisone prescription as provided by rheumatology for acute flares.  Discussion Summary:  I have spent a total of 23 minutes in direct patient care time today, occluding formulation of treatment plan and electronic prescription sent to her pharmacy of choice.    Discussed plan of care in detail with pt today; pt verb understanding and agrees.  Follow up:  No follow-ups on file.     There are no Patient Instructions on file for this visit.    Reuben Mercado DO  06/03/20  10:25          Please note that portions of this note may have been completed with a voice recognition program. Efforts were made to edit the dictations, but occasionally words are mistranscribed.

## 2020-06-07 DIAGNOSIS — G56.41 COMPLEX REGIONAL PAIN SYNDROME TYPE 2 OF RIGHT UPPER EXTREMITY: ICD-10-CM

## 2020-06-07 DIAGNOSIS — M79.18 CERVICAL MYOFASCIAL PAIN SYNDROME: Chronic | ICD-10-CM

## 2020-06-08 RX ORDER — AMITRIPTYLINE HYDROCHLORIDE 10 MG/1
TABLET, FILM COATED ORAL
Qty: 30 TABLET | Refills: 2 | Status: SHIPPED | OUTPATIENT
Start: 2020-06-08 | End: 2020-08-13 | Stop reason: SDUPTHER

## 2020-06-17 DIAGNOSIS — M79.18 CERVICAL MYOFASCIAL PAIN SYNDROME: Chronic | ICD-10-CM

## 2020-06-17 DIAGNOSIS — G56.41 COMPLEX REGIONAL PAIN SYNDROME TYPE 2 OF RIGHT UPPER EXTREMITY: ICD-10-CM

## 2020-06-17 RX ORDER — DULOXETIN HYDROCHLORIDE 60 MG/1
CAPSULE, DELAYED RELEASE ORAL
Qty: 30 CAPSULE | Refills: 2 | Status: SHIPPED | OUTPATIENT
Start: 2020-06-17 | End: 2020-09-25

## 2020-07-02 ENCOUNTER — OFFICE VISIT (OUTPATIENT)
Dept: FAMILY MEDICINE CLINIC | Facility: CLINIC | Age: 30
End: 2020-07-02

## 2020-07-02 VITALS
HEIGHT: 66 IN | DIASTOLIC BLOOD PRESSURE: 70 MMHG | SYSTOLIC BLOOD PRESSURE: 110 MMHG | TEMPERATURE: 97.7 F | BODY MASS INDEX: 34.39 KG/M2 | OXYGEN SATURATION: 98 % | WEIGHT: 214 LBS | HEART RATE: 68 BPM

## 2020-07-02 DIAGNOSIS — F39 MOOD DISORDER (HCC): ICD-10-CM

## 2020-07-02 DIAGNOSIS — G56.41 COMPLEX REGIONAL PAIN SYNDROME TYPE 2 OF RIGHT UPPER EXTREMITY: ICD-10-CM

## 2020-07-02 DIAGNOSIS — M79.18 CERVICAL MYOFASCIAL PAIN SYNDROME: Primary | Chronic | ICD-10-CM

## 2020-07-02 DIAGNOSIS — M25.511 ARTHRALGIA OF RIGHT SHOULDER REGION: ICD-10-CM

## 2020-07-02 DIAGNOSIS — M06.09 RHEUMATOID ARTHRITIS OF MULTIPLE SITES WITH NEGATIVE RHEUMATOID FACTOR (HCC): ICD-10-CM

## 2020-07-02 PROCEDURE — 99214 OFFICE O/P EST MOD 30 MIN: CPT | Performed by: FAMILY MEDICINE

## 2020-07-02 RX ORDER — GABAPENTIN 100 MG/1
100 CAPSULE ORAL NIGHTLY
Qty: 30 CAPSULE | Refills: 0 | Status: SHIPPED | OUTPATIENT
Start: 2020-07-02 | End: 2020-08-13 | Stop reason: SDUPTHER

## 2020-07-02 NOTE — PROGRESS NOTES
Established Patient        Chief Complaint:   Chief Complaint   Patient presents with   • Follow-up     medication follow up; patient is currently taking Gabapentin 100mg BID        Becky Garcia is a 29 y.o. female    History of Present Illness:   Here for scheduled follow-up visit concerning her complex regional pain syndrome of the right upper extremity, arthralgia of the right shoulder and cervical myofascial pain syndrome.  Patient continues to be followed by rheumatology concerning her rheumatoid arthritis.  Currently on methotrexate, tolerating dosing without difficulty.  She does have prednisone prescribed to her by rheumatology for acute exacerbations.  She denies any fever, chills or night sweats.  States her mood is been relatively stable.  She has noticed substantial weight transition as a result of numerous dietary modifications.  She denies any dysuria or hematuria.  Denies any bright red blood or black tarry stools.  She has tolerated the reduction in dosage of gabapentin since last visit.    Subjective     The following portions of the patient's history were reviewed and updated as appropriate: allergies, current medications, past family history, past medical history, past social history, past surgical history and problem list.    Allergies   Allergen Reactions   • Zithromax [Azithromycin] Anaphylaxis   • Mucinex Dm  [Dextromethorphan-Guaifenesin] Hives       Review of Systems  1. Constitutional: Negative for fever. Negative for chills, diaphoresis, fatigue and unexpected weight change.   2. HENT: No dysphagia; no changes to vision/hearing/smell/taste; no epistaxis  3. Eyes: Negative for redness and visual disturbance.   4. Respiratory: negative for shortness of breath. Negative for chest pain . Negative for cough and chest tightness.   5. Cardiovascular: Negative for chest pain and palpitations.   6. Gastrointestinal: Negative for abdominal distention, abdominal pain and blood in stool.  "  7. Endocrine: Negative for cold intolerance and heat intolerance.   8. Genitourinary: Negative for difficulty urinating, dysuria and frequency.   9. Musculoskeletal: Chronic arthralgias, back pain and myalgias.   10. Skin: Negative for color change, rash and wound.   11. Neurological: Negative for syncope, weakness and headaches.   12. Hematological: Negative for adenopathy. Does not bruise/bleed easily.   13. Psychiatric/Behavioral: Negative for confusion. The patient is not nervous/anxious.    Objective     Physical Exam   Vital Signs: /70   Pulse 68   Temp 97.7 °F (36.5 °C)   Ht 167.6 cm (66\")   Wt 97.1 kg (214 lb)   SpO2 98%   BMI 34.54 kg/m²     General Appearance: alert, oriented x 3, no acute distress.  Skin: warm and dry.   HEENT: Atraumatic.  pupils round and reactive to light and accommodation, oral mucosa pink and moist.  Nares patent without epistaxis.  External auditory canals are patent tympanic membranes intact.  Neck: supple, no JVD, trachea midline.  No thyromegaly  Lungs: CTA, unlabored breathing effort.  Heart: RRR, normal S1 and S2, no S3, no rub.  Abdomen: soft, non-tender, no palpable bladder, present bowel sounds to auscultation ×4.  No guarding or rigidity.  Extremities: no clubbing, cyanosis or edema.  Impaired range of motion actively and passively to right upper extremity at the shoulder.  Symmetric muscle strength and development.  Numerous Keller tender points noted throughout the trapezius muscles bilaterally, extending into the deltoids and the paravertebral musculature of the cervical spine.  Neuro: normal speech and mental status.  Cranial nerves II through XII intact.  No anosmia. DTR 2+; proprioception intact.  No focal motor/sensory deficits.    Assessment and Plan      Assessment:   Becky was seen today for follow-up.    Diagnoses and all orders for this visit:    Cervical myofascial pain syndrome  -     gabapentin (NEURONTIN) 100 MG capsule; Take 1 capsule by " mouth Every Night.    Arthralgia of right shoulder region    Complex regional pain syndrome type 2 of right upper extremity  -     gabapentin (NEURONTIN) 100 MG capsule; Take 1 capsule by mouth Every Night.    Rheumatoid arthritis of multiple sites with negative rheumatoid factor (CMS/HCC)    Mood disorder (CMS/HCC)        Plan:  Plan continuation of titration of her gabapentin.  Decreasing to once daily, to be utilized at night.  Plan to follow clinically with further guidance and changes based on her clinical response.    I have asked that she keep scheduled follow-up appointment with rheumatology.    She will need continued surveillance labs while on methotrexate.    Continue current mood stabilizing treatment regimen.  Discussion Summary:    Discussed plan of care in detail with pt today; pt verb understanding and agrees.  Follow up:  No follow-ups on file.     There are no Patient Instructions on file for this visit.    Reuben Mercado DO  07/02/20  15:27          Please note that portions of this note may have been completed with a voice recognition program. Efforts were made to edit the dictations, but occasionally words are mistranscribed.

## 2020-08-05 ENCOUNTER — TRANSCRIBE ORDERS (OUTPATIENT)
Dept: ADMINISTRATIVE | Facility: HOSPITAL | Age: 30
End: 2020-08-05

## 2020-08-05 ENCOUNTER — LAB (OUTPATIENT)
Dept: LAB | Facility: HOSPITAL | Age: 30
End: 2020-08-05

## 2020-08-05 DIAGNOSIS — M06.09 RHEUMATOID ARTHRITIS OF MULTIPLE SITES WITHOUT RHEUMATOID FACTOR (HCC): Primary | ICD-10-CM

## 2020-08-05 DIAGNOSIS — M79.18 DIFFUSE MYOFASCIAL PAIN SYNDROME: ICD-10-CM

## 2020-08-05 DIAGNOSIS — G90.511 COMPLEX REGIONAL PAIN SYNDROME TYPE 1 OF RIGHT UPPER EXTREMITY: ICD-10-CM

## 2020-08-05 DIAGNOSIS — R21 RASH AND OTHER NONSPECIFIC SKIN ERUPTION: ICD-10-CM

## 2020-08-05 DIAGNOSIS — M06.09 RHEUMATOID ARTHRITIS OF MULTIPLE SITES WITHOUT RHEUMATOID FACTOR (HCC): ICD-10-CM

## 2020-08-05 LAB
ALBUMIN SERPL-MCNC: 4.4 G/DL (ref 3.5–5.2)
ALBUMIN/GLOB SERPL: 1.5 G/DL
ALP SERPL-CCNC: 86 U/L (ref 39–117)
ALT SERPL W P-5'-P-CCNC: 8 U/L (ref 1–33)
ANION GAP SERPL CALCULATED.3IONS-SCNC: 10.1 MMOL/L (ref 5–15)
AST SERPL-CCNC: 16 U/L (ref 1–32)
BASOPHILS # BLD AUTO: 0.06 10*3/MM3 (ref 0–0.2)
BASOPHILS NFR BLD AUTO: 0.8 % (ref 0–1.5)
BILIRUB SERPL-MCNC: 0.4 MG/DL (ref 0–1.2)
BUN SERPL-MCNC: 9 MG/DL (ref 6–20)
BUN/CREAT SERPL: 11.5 (ref 7–25)
CALCIUM SPEC-SCNC: 9.7 MG/DL (ref 8.6–10.5)
CHLORIDE SERPL-SCNC: 101 MMOL/L (ref 98–107)
CO2 SERPL-SCNC: 25.9 MMOL/L (ref 22–29)
CREAT SERPL-MCNC: 0.78 MG/DL (ref 0.57–1)
CRP SERPL-MCNC: 0.52 MG/DL (ref 0–0.5)
DEPRECATED RDW RBC AUTO: 44.6 FL (ref 37–54)
EOSINOPHIL # BLD AUTO: 0.15 10*3/MM3 (ref 0–0.4)
EOSINOPHIL NFR BLD AUTO: 1.9 % (ref 0.3–6.2)
ERYTHROCYTE [DISTWIDTH] IN BLOOD BY AUTOMATED COUNT: 14.5 % (ref 12.3–15.4)
ERYTHROCYTE [SEDIMENTATION RATE] IN BLOOD: 59 MM/HR (ref 0–20)
GFR SERPL CREATININE-BSD FRML MDRD: 88 ML/MIN/1.73
GLOBULIN UR ELPH-MCNC: 2.9 GM/DL
GLUCOSE SERPL-MCNC: 93 MG/DL (ref 65–99)
HCT VFR BLD AUTO: 37.2 % (ref 34–46.6)
HGB BLD-MCNC: 12.4 G/DL (ref 12–15.9)
IMM GRANULOCYTES # BLD AUTO: 0.03 10*3/MM3 (ref 0–0.05)
IMM GRANULOCYTES NFR BLD AUTO: 0.4 % (ref 0–0.5)
LYMPHOCYTES # BLD AUTO: 1.95 10*3/MM3 (ref 0.7–3.1)
LYMPHOCYTES NFR BLD AUTO: 24.4 % (ref 19.6–45.3)
MCH RBC QN AUTO: 28.5 PG (ref 26.6–33)
MCHC RBC AUTO-ENTMCNC: 33.3 G/DL (ref 31.5–35.7)
MCV RBC AUTO: 85.5 FL (ref 79–97)
MONOCYTES # BLD AUTO: 0.43 10*3/MM3 (ref 0.1–0.9)
MONOCYTES NFR BLD AUTO: 5.4 % (ref 5–12)
NEUTROPHILS NFR BLD AUTO: 5.38 10*3/MM3 (ref 1.7–7)
NEUTROPHILS NFR BLD AUTO: 67.1 % (ref 42.7–76)
NRBC BLD AUTO-RTO: 0 /100 WBC (ref 0–0.2)
PLATELET # BLD AUTO: 208 10*3/MM3 (ref 140–450)
PMV BLD AUTO: 10.2 FL (ref 6–12)
POTASSIUM SERPL-SCNC: 4.1 MMOL/L (ref 3.5–5.2)
PROT SERPL-MCNC: 7.3 G/DL (ref 6–8.5)
RBC # BLD AUTO: 4.35 10*6/MM3 (ref 3.77–5.28)
SODIUM SERPL-SCNC: 137 MMOL/L (ref 136–145)
WBC # BLD AUTO: 8 10*3/MM3 (ref 3.4–10.8)

## 2020-08-05 PROCEDURE — 80053 COMPREHEN METABOLIC PANEL: CPT

## 2020-08-05 PROCEDURE — 85652 RBC SED RATE AUTOMATED: CPT

## 2020-08-05 PROCEDURE — 85025 COMPLETE CBC W/AUTO DIFF WBC: CPT

## 2020-08-05 PROCEDURE — 86140 C-REACTIVE PROTEIN: CPT

## 2020-08-05 PROCEDURE — 36415 COLL VENOUS BLD VENIPUNCTURE: CPT

## 2020-08-13 ENCOUNTER — OFFICE VISIT (OUTPATIENT)
Dept: FAMILY MEDICINE CLINIC | Facility: CLINIC | Age: 30
End: 2020-08-13

## 2020-08-13 VITALS
SYSTOLIC BLOOD PRESSURE: 120 MMHG | HEART RATE: 63 BPM | TEMPERATURE: 98.3 F | HEIGHT: 66 IN | BODY MASS INDEX: 34.07 KG/M2 | DIASTOLIC BLOOD PRESSURE: 80 MMHG | OXYGEN SATURATION: 98 % | WEIGHT: 212 LBS

## 2020-08-13 DIAGNOSIS — G56.41 COMPLEX REGIONAL PAIN SYNDROME TYPE 2 OF RIGHT UPPER EXTREMITY: ICD-10-CM

## 2020-08-13 DIAGNOSIS — M79.18 CERVICAL MYOFASCIAL PAIN SYNDROME: Chronic | ICD-10-CM

## 2020-08-13 PROCEDURE — 99213 OFFICE O/P EST LOW 20 MIN: CPT | Performed by: FAMILY MEDICINE

## 2020-08-13 RX ORDER — AMITRIPTYLINE HYDROCHLORIDE 10 MG/1
10 TABLET, FILM COATED ORAL NIGHTLY
Qty: 30 TABLET | Refills: 2 | Status: SHIPPED | OUTPATIENT
Start: 2020-08-13 | End: 2020-12-09

## 2020-08-13 RX ORDER — GABAPENTIN 100 MG/1
200 CAPSULE ORAL NIGHTLY
Qty: 60 CAPSULE | Refills: 2 | Status: SHIPPED | OUTPATIENT
Start: 2020-08-13 | End: 2020-11-12

## 2020-08-13 NOTE — PROGRESS NOTES
Established Patient        Chief Complaint:   Chief Complaint   Patient presents with   • Follow-up     increased pain with decreased Gabapentin         Becky Garcia is a 29 y.o. female    History of Present Illness:   Here for scheduled follow-up visit concerning her complex regional pain syndrome, cervical myofascial pain and mood disorder.  She denies any SI/HI.  She has noticed clearly reduced therapeutic response to the lower dose of gabapentin.  She has recently started occupational therapy as of last week.  Continues to be followed by rheumatology.  Denies any fever, chills or night sweats no new injuries.    Patient reports she is planning to start a new job in the next week or so.  Pleasantly anticipating starting.    Subjective     The following portions of the patient's history were reviewed and updated as appropriate: allergies, current medications, past family history, past medical history, past social history, past surgical history and problem list.    Allergies   Allergen Reactions   • Zithromax [Azithromycin] Anaphylaxis   • Mucinex Dm  [Dextromethorphan-Guaifenesin] Hives       Review of Systems  1. Constitutional: Negative for fever. Negative for chills, diaphoresis, fatigue and unexpected weight change.   2. HENT: No dysphagia; no changes to vision/hearing/smell/taste; no epistaxis  3. Eyes: Negative for redness and visual disturbance.   4. Respiratory: negative for shortness of breath. Negative for chest pain . Negative for cough and chest tightness.   5. Cardiovascular: Negative for chest pain and palpitations.   6. Gastrointestinal: Negative for abdominal distention, abdominal pain and blood in stool.   7. Endocrine: Negative for cold intolerance and heat intolerance.   8. Genitourinary: Negative for difficulty urinating, dysuria and frequency.   9. Musculoskeletal: Chronic arthralgias, back pain and myalgias.   10. Skin: Negative for color change, rash and wound.  "  11. Neurological: Negative for syncope, weakness and headaches.   12. Hematological: Negative for adenopathy. Does not bruise/bleed easily.   13. Psychiatric/Behavioral: Negative for confusion. The patient is not nervous/anxious.    Objective     Physical Exam   Vital Signs: /80   Pulse 63   Temp 98.3 °F (36.8 °C)   Ht 167.6 cm (66\")   Wt 96.2 kg (212 lb)   SpO2 98%   BMI 34.22 kg/m²     General Appearance: alert, oriented x 3, no acute distress.  Skin: warm and dry.   HEENT: Atraumatic.  pupils round and reactive to light and accommodation, oral mucosa pink and moist.  Nares patent without epistaxis.  External auditory canals are patent tympanic membranes intact.  Neck: supple, no JVD, trachea midline.  No thyromegaly  Lungs: CTA, unlabored breathing effort.  Heart: RRR, normal S1 and S2, no S3, no rub.  Abdomen: soft, non-tender, no palpable bladder, present bowel sounds to auscultation ×4.  No guarding or rigidity.  Extremities: no clubbing, cyanosis or edema.  Impaired range of motion actively and passively to right upper extremity at the shoulder.  Symmetric muscle strength and development.  Numerous Keller tender points noted throughout the trapezius muscles bilaterally, extending into the deltoids and the paravertebral musculature of the cervical spine.  Neuro: normal speech and mental status.  Cranial nerves II through XII intact.  No anosmia. DTR 2+; proprioception intact.  No focal motor/sensory deficits.    Assessment and Plan      Assessment:   Becky was seen today for follow-up.    Diagnoses and all orders for this visit:    Cervical myofascial pain syndrome  -     amitriptyline (ELAVIL) 10 MG tablet; Take 1 tablet by mouth Every Night.  -     gabapentin (NEURONTIN) 100 MG capsule; Take 2 capsules by mouth Every Night.    Complex regional pain syndrome type 2 of right upper extremity  -     amitriptyline (ELAVIL) 10 MG tablet; Take 1 tablet by mouth Every Night.  -     gabapentin " (NEURONTIN) 100 MG capsule; Take 2 capsules by mouth Every Night.        Plan:  I recommended a trial of increased dosing of gabapentin to 200 mg nightly.  She will keep us updated as to her clinical response, and if she develops any ill effects to the dose change.    I have asked that she keep her scheduled appointment to be seen by rheumatology.  She will need surveillance labs given her use of methotrexate periodically.    Continue current mood stabilizing treatment regimen.  Discussion Summary:    Discussed plan of care in detail with pt today; pt verb understanding and agrees.  Follow up:  No follow-ups on file.     There are no Patient Instructions on file for this visit.    Reuben Mercado,   08/13/20  09:11          Please note that portions of this note may have been completed with a voice recognition program. Efforts were made to edit the dictations, but occasionally words are mistranscribed.

## 2020-09-14 ENCOUNTER — HOSPITAL ENCOUNTER (EMERGENCY)
Facility: HOSPITAL | Age: 30
Discharge: HOME | End: 2020-09-14
Attending: EMERGENCY MEDICINE | Admitting: EMERGENCY MEDICINE

## 2020-09-14 VITALS
SYSTOLIC BLOOD PRESSURE: 143 MMHG | BODY MASS INDEX: 35.1 KG/M2 | HEART RATE: 69 BPM | HEIGHT: 66 IN | OXYGEN SATURATION: 100 % | WEIGHT: 218.4 LBS | RESPIRATION RATE: 18 BRPM | TEMPERATURE: 98.1 F | DIASTOLIC BLOOD PRESSURE: 81 MMHG

## 2020-09-14 DIAGNOSIS — N30.00 ACUTE CYSTITIS WITHOUT HEMATURIA: Primary | ICD-10-CM

## 2020-09-14 LAB
ALBUMIN SERPL-MCNC: 4.1 G/DL (ref 3.5–5.2)
ALBUMIN/GLOB SERPL: 1.4 G/DL
ALP SERPL-CCNC: 113 U/L (ref 39–117)
ALT SERPL W P-5'-P-CCNC: 13 U/L (ref 1–33)
ANION GAP SERPL CALCULATED.3IONS-SCNC: 9.5 MMOL/L (ref 5–15)
AST SERPL-CCNC: 19 U/L (ref 1–32)
B-HCG UR QL: NEGATIVE
BACTERIA UR QL AUTO: ABNORMAL /HPF
BASOPHILS # BLD AUTO: 0.06 10*3/MM3 (ref 0–0.2)
BASOPHILS NFR BLD AUTO: 0.7 % (ref 0–1.5)
BILIRUB SERPL-MCNC: 0.4 MG/DL (ref 0–1.2)
BILIRUB UR QL STRIP: NEGATIVE
BUN SERPL-MCNC: 8 MG/DL (ref 6–20)
BUN/CREAT SERPL: 11 (ref 7–25)
CALCIUM SPEC-SCNC: 9.2 MG/DL (ref 8.6–10.5)
CHLORIDE SERPL-SCNC: 104 MMOL/L (ref 98–107)
CLARITY UR: CLEAR
CO2 SERPL-SCNC: 25.5 MMOL/L (ref 22–29)
COLOR UR: YELLOW
CREAT SERPL-MCNC: 0.73 MG/DL (ref 0.57–1)
DEPRECATED RDW RBC AUTO: 48.2 FL (ref 37–54)
EOSINOPHIL # BLD AUTO: 0.14 10*3/MM3 (ref 0–0.4)
EOSINOPHIL NFR BLD AUTO: 1.7 % (ref 0.3–6.2)
ERYTHROCYTE [DISTWIDTH] IN BLOOD BY AUTOMATED COUNT: 14.5 % (ref 12.3–15.4)
GFR SERPL CREATININE-BSD FRML MDRD: 95 ML/MIN/1.73
GLOBULIN UR ELPH-MCNC: 3 GM/DL
GLUCOSE SERPL-MCNC: 99 MG/DL (ref 65–99)
GLUCOSE UR STRIP-MCNC: NEGATIVE MG/DL
HCT VFR BLD AUTO: 36.7 % (ref 34–46.6)
HGB BLD-MCNC: 12 G/DL (ref 12–15.9)
HGB UR QL STRIP.AUTO: NEGATIVE
HYALINE CASTS UR QL AUTO: ABNORMAL /LPF
IMM GRANULOCYTES # BLD AUTO: 0.03 10*3/MM3 (ref 0–0.05)
IMM GRANULOCYTES NFR BLD AUTO: 0.4 % (ref 0–0.5)
KETONES UR QL STRIP: NEGATIVE
LEUKOCYTE ESTERASE UR QL STRIP.AUTO: ABNORMAL
LYMPHOCYTES # BLD AUTO: 1.4 10*3/MM3 (ref 0.7–3.1)
LYMPHOCYTES NFR BLD AUTO: 17.1 % (ref 19.6–45.3)
MAGNESIUM SERPL-MCNC: 1.9 MG/DL (ref 1.6–2.6)
MCH RBC QN AUTO: 29.9 PG (ref 26.6–33)
MCHC RBC AUTO-ENTMCNC: 32.7 G/DL (ref 31.5–35.7)
MCV RBC AUTO: 91.3 FL (ref 79–97)
MONOCYTES # BLD AUTO: 0.47 10*3/MM3 (ref 0.1–0.9)
MONOCYTES NFR BLD AUTO: 5.7 % (ref 5–12)
NEUTROPHILS NFR BLD AUTO: 6.08 10*3/MM3 (ref 1.7–7)
NEUTROPHILS NFR BLD AUTO: 74.4 % (ref 42.7–76)
NITRITE UR QL STRIP: NEGATIVE
NRBC BLD AUTO-RTO: 0 /100 WBC (ref 0–0.2)
PH UR STRIP.AUTO: 6.5 [PH] (ref 5–8)
PLATELET # BLD AUTO: 176 10*3/MM3 (ref 140–450)
PMV BLD AUTO: 8.8 FL (ref 6–12)
POTASSIUM SERPL-SCNC: 4.1 MMOL/L (ref 3.5–5.2)
PROT SERPL-MCNC: 7.1 G/DL (ref 6–8.5)
PROT UR QL STRIP: NEGATIVE
RBC # BLD AUTO: 4.02 10*6/MM3 (ref 3.77–5.28)
RBC # UR: ABNORMAL /HPF
REF LAB TEST METHOD: ABNORMAL
SODIUM SERPL-SCNC: 139 MMOL/L (ref 136–145)
SP GR UR STRIP: 1.01 (ref 1–1.03)
SQUAMOUS #/AREA URNS HPF: ABNORMAL /HPF
UROBILINOGEN UR QL STRIP: ABNORMAL
WBC # BLD AUTO: 8.18 10*3/MM3 (ref 3.4–10.8)
WBC UR QL AUTO: ABNORMAL /HPF

## 2020-09-14 PROCEDURE — 85025 COMPLETE CBC W/AUTO DIFF WBC: CPT | Performed by: PHYSICIAN ASSISTANT

## 2020-09-14 PROCEDURE — 81025 URINE PREGNANCY TEST: CPT | Performed by: PHYSICIAN ASSISTANT

## 2020-09-14 PROCEDURE — 83735 ASSAY OF MAGNESIUM: CPT | Performed by: PHYSICIAN ASSISTANT

## 2020-09-14 PROCEDURE — 99283 EMERGENCY DEPT VISIT LOW MDM: CPT

## 2020-09-14 PROCEDURE — 80053 COMPREHEN METABOLIC PANEL: CPT | Performed by: PHYSICIAN ASSISTANT

## 2020-09-14 PROCEDURE — 81001 URINALYSIS AUTO W/SCOPE: CPT | Performed by: PHYSICIAN ASSISTANT

## 2020-09-14 RX ORDER — CEPHALEXIN 500 MG/1
500 CAPSULE ORAL 2 TIMES DAILY
Qty: 10 CAPSULE | Refills: 0 | Status: SHIPPED | OUTPATIENT
Start: 2020-09-14 | End: 2020-09-19

## 2020-09-14 RX ORDER — SODIUM CHLORIDE 0.9 % (FLUSH) 0.9 %
10 SYRINGE (ML) INJECTION AS NEEDED
Status: DISCONTINUED | OUTPATIENT
Start: 2020-09-14 | End: 2020-09-14 | Stop reason: HOSPADM

## 2020-09-14 NOTE — ED PROVIDER NOTES
Subjective   This patient is a 29-year-old female with a history of depression, migraines, rheumatoid arthritis and complex regional pain syndrome.  She states since about midnight she has had numbness and heaviness from her upper back all the way down to her feet.  She denies any symptoms on the anterior aspect of her body.  No chest pain short of breath headache abdominal pain nausea vomiting or diarrhea.  She denies any pain at this time only numbness and heaviness of the back side of her body.          Review of Systems   Constitutional: Negative.  Negative for fever.   HENT: Negative.    Eyes: Negative.    Respiratory: Negative.  Negative for shortness of breath.    Cardiovascular: Negative.  Negative for chest pain.   Gastrointestinal: Negative.  Negative for abdominal pain.   Genitourinary: Negative.    Musculoskeletal: Negative.    Skin: Negative.    Neurological: Positive for numbness. Negative for dizziness and headaches.   Psychiatric/Behavioral: Negative.        Past Medical History:   Diagnosis Date   • Depression    • Hx of migraine headaches    • Rheumatoid arthritis (CMS/HCC)        Allergies   Allergen Reactions   • Zithromax [Azithromycin] Anaphylaxis   • Mucinex Dm  [Dextromethorphan-Guaifenesin] Hives       Past Surgical History:   Procedure Laterality Date   • EYE SURGERY  2007    upper eyelid    • EYE SURGERY  2015    upper eyelid       Family History   Problem Relation Age of Onset   • Hypertension Mother    • Hypertension Father    • Cancer Maternal Grandmother    • Arthritis Paternal Grandmother    • Cancer Paternal Grandfather        Social History     Socioeconomic History   • Marital status: Single     Spouse name: Not on file   • Number of children: Not on file   • Years of education: Not on file   • Highest education level: Not on file   Tobacco Use   • Smoking status: Never Smoker   • Smokeless tobacco: Never Used   Substance and Sexual Activity   • Alcohol use: No     Frequency: Never    • Drug use: No   • Sexual activity: Defer           Objective   Physical Exam  Vitals signs and nursing note reviewed.   Constitutional:       Appearance: Normal appearance.   HENT:      Head: Normocephalic and atraumatic.      Mouth/Throat:      Mouth: Mucous membranes are moist.   Eyes:      Extraocular Movements: Extraocular movements intact.   Neck:      Musculoskeletal: Normal range of motion.   Cardiovascular:      Rate and Rhythm: Normal rate and regular rhythm.   Pulmonary:      Effort: Pulmonary effort is normal.      Breath sounds: Normal breath sounds.   Abdominal:      Palpations: Abdomen is soft.   Musculoskeletal: Normal range of motion.   Skin:     General: Skin is warm and dry.   Neurological:      General: No focal deficit present.      Mental Status: She is alert. Mental status is at baseline.      Motor: No weakness.      Coordination: Coordination normal.      Comments: 5 out of 5 strength in all 4 extremities.   Psychiatric:         Mood and Affect: Mood normal.         Behavior: Behavior normal.         Procedures           ED Course  ED Course as of Sep 14 1122   Mon Sep 14, 2020   1120 WBC, UA(!): 21-30 [TM]   1121 Bacteria, UA(!): Trace [TM]      ED Course User Index  [TM] Pedro Bob PA-C                                           MDM  Noted urinary tract infection.  Will treat with antibiotics.  Lab work otherwise reassuring and vital signs stable.  Final diagnoses:   Acute cystitis without hematuria            Pedro Bob PA-C  09/14/20 1122       Pedro Bob PA-C  09/14/20 1122

## 2020-09-25 DIAGNOSIS — M79.18 CERVICAL MYOFASCIAL PAIN SYNDROME: Chronic | ICD-10-CM

## 2020-09-25 DIAGNOSIS — G56.41 COMPLEX REGIONAL PAIN SYNDROME TYPE 2 OF RIGHT UPPER EXTREMITY: ICD-10-CM

## 2020-09-25 RX ORDER — DULOXETIN HYDROCHLORIDE 60 MG/1
CAPSULE, DELAYED RELEASE ORAL
Qty: 30 CAPSULE | Refills: 1 | Status: SHIPPED | OUTPATIENT
Start: 2020-09-25 | End: 2020-11-25 | Stop reason: SDUPTHER

## 2020-11-11 DIAGNOSIS — G56.41 COMPLEX REGIONAL PAIN SYNDROME TYPE 2 OF RIGHT UPPER EXTREMITY: ICD-10-CM

## 2020-11-11 DIAGNOSIS — M79.18 CERVICAL MYOFASCIAL PAIN SYNDROME: Chronic | ICD-10-CM

## 2020-11-12 RX ORDER — GABAPENTIN 100 MG/1
CAPSULE ORAL
Qty: 60 CAPSULE | Refills: 1 | Status: SHIPPED | OUTPATIENT
Start: 2020-11-12 | End: 2021-01-11

## 2020-11-25 ENCOUNTER — OFFICE VISIT (OUTPATIENT)
Dept: FAMILY MEDICINE CLINIC | Facility: CLINIC | Age: 30
End: 2020-11-25

## 2020-11-25 VITALS
DIASTOLIC BLOOD PRESSURE: 70 MMHG | HEIGHT: 66 IN | OXYGEN SATURATION: 99 % | HEART RATE: 96 BPM | TEMPERATURE: 97.8 F | BODY MASS INDEX: 36.32 KG/M2 | SYSTOLIC BLOOD PRESSURE: 112 MMHG | WEIGHT: 226 LBS

## 2020-11-25 DIAGNOSIS — G56.41 COMPLEX REGIONAL PAIN SYNDROME TYPE 2 OF RIGHT UPPER EXTREMITY: Primary | ICD-10-CM

## 2020-11-25 DIAGNOSIS — Z23 FLU VACCINE NEED: ICD-10-CM

## 2020-11-25 DIAGNOSIS — M79.18 CERVICAL MYOFASCIAL PAIN SYNDROME: Chronic | ICD-10-CM

## 2020-11-25 DIAGNOSIS — M25.511 TRIGGER POINT OF SHOULDER REGION, RIGHT: ICD-10-CM

## 2020-11-25 DIAGNOSIS — F39 MOOD DISORDER (HCC): ICD-10-CM

## 2020-11-25 PROCEDURE — 90471 IMMUNIZATION ADMIN: CPT | Performed by: FAMILY MEDICINE

## 2020-11-25 PROCEDURE — 99213 OFFICE O/P EST LOW 20 MIN: CPT | Performed by: FAMILY MEDICINE

## 2020-11-25 PROCEDURE — 20552 NJX 1/MLT TRIGGER POINT 1/2: CPT | Performed by: FAMILY MEDICINE

## 2020-11-25 PROCEDURE — 90686 IIV4 VACC NO PRSV 0.5 ML IM: CPT | Performed by: FAMILY MEDICINE

## 2020-11-25 RX ORDER — DULOXETIN HYDROCHLORIDE 60 MG/1
60 CAPSULE, DELAYED RELEASE ORAL DAILY
Qty: 90 CAPSULE | Refills: 1 | Status: SHIPPED | OUTPATIENT
Start: 2020-11-25 | End: 2021-06-04 | Stop reason: SDUPTHER

## 2020-12-08 DIAGNOSIS — G56.41 COMPLEX REGIONAL PAIN SYNDROME TYPE 2 OF RIGHT UPPER EXTREMITY: ICD-10-CM

## 2020-12-08 DIAGNOSIS — M79.18 CERVICAL MYOFASCIAL PAIN SYNDROME: Chronic | ICD-10-CM

## 2020-12-09 RX ORDER — AMITRIPTYLINE HYDROCHLORIDE 10 MG/1
TABLET, FILM COATED ORAL
Qty: 90 TABLET | Refills: 1 | Status: SHIPPED | OUTPATIENT
Start: 2020-12-09 | End: 2021-06-04 | Stop reason: SDUPTHER

## 2020-12-22 NOTE — PROGRESS NOTES
Established Patient        Chief Complaint:   Chief Complaint   Patient presents with   • Follow-up     right shoulder pain x several months; pain scale 7 today in officce        Becky Garcia is a 29 y.o. female    History of Present Illness:   Here for scheduled follow-up visit concerning right shoulder pain for the last several months.  She describes the pain is varying in severity, denies any new trauma or injuries.  Denies any fever or chills.  She states that the discomfort does affect her ability to sleep on that side, as well as generalized use of her otherwise right dominant upper extremity.  She denies any paresthesias.  She states that the pain seems out of proportion to expected nature at times additionally.    Subjective     The following portions of the patient's history were reviewed and updated as appropriate: allergies, current medications, past family history, past medical history, past social history, past surgical history and problem list.    Allergies   Allergen Reactions   • Zithromax [Azithromycin] Anaphylaxis       Review of Systems  1. Constitutional: Negative for fever. Negative for chills, diaphoresis, fatigue and unexpected weight change.   2. HENT: No dysphagia; no changes to vision/hearing/smell/taste; no epistaxis  3. Eyes: Negative for redness and visual disturbance.   4. Respiratory: negative for shortness of breath. Negative for chest pain . Negative for cough and chest tightness.   5. Cardiovascular: Negative for chest pain and palpitations.   6. Gastrointestinal: Negative for abdominal distention, abdominal pain and blood in stool.   7. Endocrine: Negative for cold intolerance and heat intolerance.   8. Genitourinary: Negative for difficulty urinating, dysuria and frequency.   9. Musculoskeletal: Right shoulder pain as per above.  10. Skin: Negative for color change, rash and wound.   11. Neurological: Negative for syncope, weakness and headaches.   12. Hematological:  "Negative for adenopathy. Does not bruise/bleed easily.   13. Psychiatric/Behavioral: Negative for confusion. The patient is not nervous/anxious.    Objective     Physical Exam   Vital Signs: /80   Pulse 72   Temp 98.8 °F (37.1 °C)   Ht 167.6 cm (66\")   Wt 109 kg (241 lb)   SpO2 98%   BMI 38.90 kg/m²     General Appearance: alert, oriented x 3, no acute distress.  Skin: warm and dry.   HEENT: Atraumatic.  pupils round and reactive to light and accommodation, oral mucosa pink and moist.  Nares patent without epistaxis.  External auditory canals are patent tympanic membranes intact.  Neck: supple, no JVD, trachea midline.  No thyromegaly  Lungs: CTA, unlabored breathing effort.  Heart: RRR, normal S1 and S2, no S3, no rub.  Abdomen: soft, non-tender, no palpable bladder, present bowel sounds to auscultation ×4.  No guarding or rigidity.  Extremities: no clubbing, cyanosis or edema.  Normal tone and development to bilateral deltoids.  Decreased range of motion to the right shoulder, empty can and Cristina test positive for discomfort.  Pain on palpation along the supraspinatus tendon passively.  Lateral subacromial space tenderness on palpation.  Negative shrug sign, push off is normal.   strength symmetric bilaterally.  Intact supination and pronation of bilateral forearms.  Apprehension sign negative.  [facility] subjectively pos.  Without findings of winging of the scapula.  Neuro: normal speech and mental status.  Cranial nerves II through XII intact.  No anosmia. DTR 2+; proprioception intact.  No focal motor/sensory deficits.    Assessment and Plan      Assessment:   Becky was seen today for follow-up.    Diagnoses and all orders for this visit:    Cervical myofascial pain syndrome  -     gabapentin (NEURONTIN) 100 MG capsule; Take 1 capsule by mouth 2 (Two) Times a Day.  -     DULoxetine (CYMBALTA) 20 MG capsule; Take 1 capsule by mouth Daily.    Complex regional pain syndrome type 2 of right " upper extremity  -     gabapentin (NEURONTIN) 100 MG capsule; Take 1 capsule by mouth 2 (Two) Times a Day.  -     DULoxetine (CYMBALTA) 20 MG capsule; Take 1 capsule by mouth Daily.        Plan:  She demonstrates continued difficulties as a result of her multiplanar instability of the right shoulder.  I do suspect that patient is suffering from a complex regional pain syndrome, type II, the right upper extremity, as well as associated cervical myofascial pain syndrome.  I recommended addition of duloxetine, starting with 20 mg once daily dosing, as well as addition of gabapentin 100 mg twice daily dosing.  Effects of both new medications can be monitored clinically, with dosing adjustment as needed based on her clinical response.    Discussion Summary:    Discussed plan of care in detail with pt today; pt verb understanding and agrees.  Follow up:  Return in about 4 weeks (around 1/17/2020) for Recheck, Med Change/New Meds.     Patient Instructions   Complex Regional Pain Syndrome  Complex regional pain syndrome (CRPS) is a nerve disorder that causes long-term (chronic) pain. This is usually in a hand, arm, foot, or leg. CRPS usually occurs after an injury or trauma, such as a fracture or sprain.  There are two types of CRPS:  · Type 1. This type occurs after an injury with no known damage to a nerve.  · Type 2. This type occurs after an injury that damages a nerve.  There are three stages of the condition:  · Stage 1. This stage, called the acute stage, may last for up to 3 months.  · Stage 2. This stage, called the dystrophic stage, may last for 3-12 months.  · Stage 3. This stage, called the atrophic stage, may start after one year.  CRPS ranges from mild to severe. For most people, CRPS is mild and recovery happens over time. For others, CRPS lasts for a very long time and makes everyday tasks hard to do.  What are the causes?  The exact cause of this condition is not known. It is usually triggered by an  injury.  What increases the risk?  You are more likely to develop this condition if:  · You are female.  · You have any of the following injuries:  ? A wrist fracture that involves a lower arm bone (distal radius fracture).  ? Ankle dislocation or fracture.  ? A long surgery time.  ? Possible nerve injury during surgery.  What are the signs or symptoms?  Signs and symptoms in the affected hand, arm, foot, or leg are different for each stage.  Signs and symptoms of stage 1 include:  · Burning pain.  · A prickling, tingling feeling (pins and needles sensation).  · Extremely sensitive skin.  · Swelling.  · Joint stiffness.  · Warmth and redness.  · Excessive sweating.  · Hair and nail growth that is faster than normal.  Signs and symptoms of stage 2 include:  · Spreading of pain to the whole arm or leg.  · Increased skin sensitivity.  · Increased swelling and stiffness.  · Coolness of the skin.  · Blue discoloration of skin.  · Loss of skin wrinkles.  · Brittle fingernails.  Signs and symptoms of stage 3 include:  · Pain that spreads to other areas of the body but becomes less severe.  · More stiffness, leading to loss of motion.  · Skin that is pale, dry, shiny, or tightly stretched.  How is this diagnosed?  This condition may be diagnosed based on:  · Your signs and symptoms.   · A physical exam.  There is no test to diagnose CRPS, but you may have tests:  · To check for bone changes that might indicate CRPS. These tests may include an MRI or bone scan.  · To rule out other possible causes of your symptoms.  How is this treated?  Early treatment may prevent CRPS from advancing past stage 1. There is not one treatment that works for everyone. Treatment options may include:  · Medicines, which may include:  ? NSAIDs, such as ibuprofen.  ? Steroids.  ? Blood pressure drugs.  ? Antidepressants.  ? Anti-seizure drugs.  ? Pain relievers.  · Exercise.  · Occupational therapy (OT) and physical therapy  (PT).  · Biofeedback.  · Mental health counseling.  · Numbing injections.  · Spinal surgery to implant a spinal cord stimulator or a pain pump.  Follow these instructions at home:  Medicines  · Take over-the-counter and prescription medicines only as told by your health care provider.  · Do not drive or use heavy machinery while taking prescription pain medicine.  · If you are taking prescription pain medicine, take actions to prevent or treat constipation. Your health care provider may recommend that you:  ? Drink enough fluid to keep your urine pale yellow.  ? Eat foods that are high in fiber, such as fresh fruits and vegetables, whole grains, and beans.  ? Limit foods that are high in fat and processed sugars, such as fried or sweet foods.  ? Take an over-the-counter or prescription medicine for constipation.  General instructions  · Do not use any products that contain nicotine or tobacco, such as cigarettes and e-cigarettes. If you need help quitting, ask your health care provider.  · Maintain a healthy weight.  · Return to your normal activities as told by your health care provider. Ask your health care provider what activities are safe for you.  · Follow an exercise program as directed by your health care provider.  · Keep all follow-up visits as told by your health care provider. This is important.  Contact a health care provider if:  · Your symptoms change.  · Your symptoms get worse.  · You develop anxiety or depression.  Summary  · Complex regional pain syndrome (CRPS) is a nerve disorder that causes long-term (chronic) pain, usually in a hand, arm, leg, or foot.  · CRPS usually occurs after an injury or trauma, such as a fracture or sprain.  · CRPS ranges from mild to severe. Early treatment may prevent CRPS from advancing to more severe stages.  This information is not intended to replace advice given to you by your health care provider. Make sure you discuss any questions you have with your health care  provider.  Document Released: 12/08/2003 Document Revised: 11/12/2018 Document Reviewed: 11/12/2018  [employer] Patient Education © 2019 "[employer]        Reuben Mercado DO  12/23/19  5:36 PM    Please note that portions of this note may have been completed with a voice recognition program. Efforts were made to edit the dictations, but occasionally words are mistranscribed.   none

## 2021-01-09 DIAGNOSIS — M79.18 CERVICAL MYOFASCIAL PAIN SYNDROME: Chronic | ICD-10-CM

## 2021-01-09 DIAGNOSIS — G56.41 COMPLEX REGIONAL PAIN SYNDROME TYPE 2 OF RIGHT UPPER EXTREMITY: ICD-10-CM

## 2021-01-11 RX ORDER — GABAPENTIN 100 MG/1
CAPSULE ORAL
Qty: 60 CAPSULE | Refills: 2 | Status: SHIPPED | OUTPATIENT
Start: 2021-01-11 | End: 2021-04-13

## 2021-02-25 ENCOUNTER — TELEPHONE (OUTPATIENT)
Dept: FAMILY MEDICINE CLINIC | Facility: CLINIC | Age: 31
End: 2021-02-25

## 2021-02-25 NOTE — TELEPHONE ENCOUNTER
PATIENT CALLED TO REQUEST A MEDICATION LIST ON OFFICE LETTER HEAD.       PATIENT NEEDS THIS FOR JOB INTERVIEW BY TOMORROW AFTERNOON  FEB. 26TH, AT LATEST.     PLEASE CALL PATIENT WHEN READY TO : [phone]

## 2021-04-12 DIAGNOSIS — M79.18 CERVICAL MYOFASCIAL PAIN SYNDROME: Chronic | ICD-10-CM

## 2021-04-12 DIAGNOSIS — G56.41 COMPLEX REGIONAL PAIN SYNDROME TYPE 2 OF RIGHT UPPER EXTREMITY: ICD-10-CM

## 2021-04-13 RX ORDER — GABAPENTIN 100 MG/1
CAPSULE ORAL
Qty: 60 CAPSULE | Refills: 1 | Status: SHIPPED | OUTPATIENT
Start: 2021-04-13 | End: 2021-06-16

## 2021-05-17 ENCOUNTER — HOSPITAL ENCOUNTER (EMERGENCY)
Facility: HOSPITAL | Age: 31
Discharge: HOME | End: 2021-05-17
Attending: EMERGENCY MEDICINE | Admitting: EMERGENCY MEDICINE

## 2021-05-17 VITALS
OXYGEN SATURATION: 100 % | RESPIRATION RATE: 16 BRPM | SYSTOLIC BLOOD PRESSURE: 137 MMHG | HEIGHT: 66 IN | DIASTOLIC BLOOD PRESSURE: 97 MMHG | HEART RATE: 79 BPM | WEIGHT: 230 LBS | TEMPERATURE: 98.1 F | BODY MASS INDEX: 36.96 KG/M2

## 2021-05-17 DIAGNOSIS — M54.40 BACK PAIN OF LUMBAR REGION WITH SCIATICA: Primary | ICD-10-CM

## 2021-05-17 PROCEDURE — 99282 EMERGENCY DEPT VISIT SF MDM: CPT

## 2021-05-17 PROCEDURE — 96372 THER/PROPH/DIAG INJ SC/IM: CPT

## 2021-05-17 PROCEDURE — 25010000002 KETOROLAC TROMETHAMINE PER 15 MG: Performed by: PHYSICIAN ASSISTANT

## 2021-05-17 PROCEDURE — 25010000002 METHYLPREDNISOLONE PER 125 MG: Performed by: PHYSICIAN ASSISTANT

## 2021-05-17 RX ORDER — KETOROLAC TROMETHAMINE 30 MG/ML
30 INJECTION, SOLUTION INTRAMUSCULAR; INTRAVENOUS ONCE
Status: COMPLETED | OUTPATIENT
Start: 2021-05-17 | End: 2021-05-17

## 2021-05-17 RX ORDER — METHYLPREDNISOLONE SODIUM SUCCINATE 125 MG/2ML
125 INJECTION, POWDER, LYOPHILIZED, FOR SOLUTION INTRAMUSCULAR; INTRAVENOUS ONCE
Status: COMPLETED | OUTPATIENT
Start: 2021-05-17 | End: 2021-05-17

## 2021-05-17 RX ORDER — KETOROLAC TROMETHAMINE 10 MG/1
10 TABLET, FILM COATED ORAL EVERY 6 HOURS PRN
Qty: 20 TABLET | Refills: 0 | Status: SHIPPED | OUTPATIENT
Start: 2021-05-17 | End: 2021-06-29

## 2021-05-17 RX ORDER — PREDNISONE 20 MG/1
20 TABLET ORAL 3 TIMES DAILY
Qty: 15 TABLET | Refills: 0 | Status: SHIPPED | OUTPATIENT
Start: 2021-05-17 | End: 2021-05-22

## 2021-05-17 RX ORDER — ORPHENADRINE CITRATE 100 MG/1
100 TABLET, EXTENDED RELEASE ORAL 2 TIMES DAILY
Qty: 20 TABLET | Refills: 0 | Status: SHIPPED | OUTPATIENT
Start: 2021-05-17 | End: 2021-06-29

## 2021-05-17 RX ADMIN — METHYLPREDNISOLONE SODIUM SUCCINATE 125 MG: 125 INJECTION, POWDER, FOR SOLUTION INTRAMUSCULAR; INTRAVENOUS at 12:46

## 2021-05-17 RX ADMIN — KETOROLAC TROMETHAMINE 30 MG: 30 INJECTION, SOLUTION INTRAMUSCULAR; INTRAVENOUS at 12:46

## 2021-06-03 DIAGNOSIS — G56.41 COMPLEX REGIONAL PAIN SYNDROME TYPE 2 OF RIGHT UPPER EXTREMITY: ICD-10-CM

## 2021-06-03 DIAGNOSIS — M79.18 CERVICAL MYOFASCIAL PAIN SYNDROME: Chronic | ICD-10-CM

## 2021-06-04 RX ORDER — AMITRIPTYLINE HYDROCHLORIDE 10 MG/1
TABLET, FILM COATED ORAL
Qty: 90 TABLET | Refills: 0 | Status: SHIPPED | OUTPATIENT
Start: 2021-06-04 | End: 2021-09-10

## 2021-06-04 RX ORDER — DULOXETIN HYDROCHLORIDE 60 MG/1
CAPSULE, DELAYED RELEASE ORAL
Qty: 90 CAPSULE | Refills: 0 | Status: SHIPPED | OUTPATIENT
Start: 2021-06-04 | End: 2021-09-01

## 2021-06-09 NOTE — PROGRESS NOTES
Physical Therapy Daily Progress Note        Becky Garcia reports 2-3/10 pain today at rest.  Pt reports that her pain began to decrease this Monday and has been feeling pretty good since then. Pt states that most of her pain has been in the UT/levator and through the anterior shoulder recently.         Objective Pt present to PT today with no distress at rest.     Pt able to perform all exercises well today although had some irritation in the levator scapulae on the R side following session.       See Exercise, Manual, and Modality Logs for complete treatment.     Assessment/Plan  Pt continues to be very tender through the periscapular muscles and latissimus dorsi. Pt very tender through the pectorals as well. Pt would benefit from PT to help improve shoulder strength, stability, and motor control in order to return to pain free daily function.      Progress per Plan of Care  Progress as tolerated           Manual Therapy:    17     mins  20787;  Therapeutic Exercise:         mins  31181;     Neuromuscular Melecio:    14    mins  07832;    Therapeutic Activity:          mins  40414;     Gait Training:        ___  mins  92926;     Ultrasound:          mins  05079;    Electrical Stimulation:         mins  03309 ( );  Dry Needling          mins self-pay    Timed Treatment:   31   mins   Total Treatment:     64   mins    Mitul Cope, PT  Physical Therapist         RN called mom who is looking for expertise for a full work up of pt's reason for visit. 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 Pt was then referred by therapist to this clinic for a full work up. Mom wanted to make sure that if pt comes to clinic that MD is willing to do imaging/diagnostic studies for this diagnosis along with other symptoms (behavioral issues). Mom states currently pt only has had labs done- no eeg or any kind of imaging.   Please advise.

## 2021-06-15 DIAGNOSIS — M79.18 CERVICAL MYOFASCIAL PAIN SYNDROME: Chronic | ICD-10-CM

## 2021-06-15 DIAGNOSIS — G56.41 COMPLEX REGIONAL PAIN SYNDROME TYPE 2 OF RIGHT UPPER EXTREMITY: ICD-10-CM

## 2021-06-16 RX ORDER — GABAPENTIN 100 MG/1
CAPSULE ORAL
Qty: 60 CAPSULE | Refills: 0 | Status: SHIPPED | OUTPATIENT
Start: 2021-06-16 | End: 2021-07-19

## 2021-06-29 ENCOUNTER — TELEMEDICINE (OUTPATIENT)
Dept: FAMILY MEDICINE CLINIC | Facility: CLINIC | Age: 31
End: 2021-06-29

## 2021-06-29 DIAGNOSIS — M25.50 POLYARTHRALGIA: ICD-10-CM

## 2021-06-29 DIAGNOSIS — G56.41 COMPLEX REGIONAL PAIN SYNDROME TYPE 2 OF RIGHT UPPER EXTREMITY: ICD-10-CM

## 2021-06-29 DIAGNOSIS — M05.79 RHEUMATOID ARTHRITIS INVOLVING MULTIPLE SITES WITH POSITIVE RHEUMATOID FACTOR (HCC): Primary | ICD-10-CM

## 2021-06-29 PROCEDURE — 99213 OFFICE O/P EST LOW 20 MIN: CPT | Performed by: FAMILY MEDICINE

## 2021-06-29 NOTE — PROGRESS NOTES
Established Patient        Chief Complaint: No chief complaint on file.       Becky Garcia is a 30 y.o. female    History of Present Illness:   Here for requested virtual visit utilizing audio and video components, due to the viral epidemic.  Patient has had continued difficulties with her rheumatoid arthritis, including myalgias and polyarthralgias.  She has not been able to see her previous rheumatologist for approximately 8 months.  She would like a referral to rheumatology Center in [place] if able.  She denies any fever, chills or night sweats.  No new rashes.  She has returned to work, now back to full-time.  She continues to have periods of morning stiffness as well as myalgias and arthralgias as per above.  Maintains an active lifestyle, balanced dietary intake.  She denies any urinary or bowel changes.    Subjective     The following portions of the patient's history were reviewed and updated as appropriate: allergies, current medications, past family history, past medical history, past social history, past surgical history and problem list.    Allergies   Allergen Reactions   • Zithromax [Azithromycin] Anaphylaxis   • Mucinex Dm  [Dextromethorphan-Guaifenesin] Hives       Review of Systems  1. Constitutional: Negative for fever. Negative for chills, diaphoresis, fatigue and unexpected weight change.   2. HENT: No dysphagia; no changes to vision/hearing/smell/taste; no epistaxis.  Increased nasal congestion, without epistaxis.  Postnasal drainage with occasional cough.  Mild hoarseness to voice upon awakening in the morning.  3. Eyes: Negative for redness and visual disturbance.   4. Respiratory: negative for shortness of breath. Negative for chest pain . Negative for cough and chest tightness.   5. Cardiovascular: Negative for chest pain and palpitations.   6. Gastrointestinal: Negative for abdominal distention, abdominal pain and blood in stool.   7. Endocrine: Negative for cold intolerance  and heat intolerance.   8. Genitourinary: Negative for difficulty urinating, dysuria and frequency.   9. Musculoskeletal: Chronic arthralgias, back pain and myalgias.   10. Skin: Negative for color change, rash and wound.   11. Neurological: Negative for syncope, weakness and headaches.   12. Hematological: Negative for adenopathy. Does not bruise/bleed easily.   13. Psychiatric/Behavioral: Negative for confusion. The patient is not nervous/anxious.    Objective     Physical Exam   Vital Signs: There were no vitals taken for this visit.    General Appearance: alert, oriented x 3, no acute distress.  Pleasant and interactive during questioning.  Skin: warm and dry.  Without obvious rash or jaundice.  HEENT: Atraumatic.  Oral mucosa pink and moist.  Nares patent without epistaxis.  External auditory canals are patent.  Neck: supple, no JVD, trachea midline.  No thyromegaly  Lungs: unlabored breathing effort.  Extremities: Good range of motion actively and passively.  Moves all 4 extremities.  Neuro: normal speech and mental status.     Assessment and Plan      Assessment:   Diagnoses and all orders for this visit:    1. Rheumatoid arthritis involving multiple sites with positive rheumatoid factor (CMS/[facility]) (Primary)  -     Ambulatory Referral to Rheumatology    2. Complex regional pain syndrome type 2 of right upper extremity  -     Ambulatory Referral to Rheumatology    3. Polyarthralgia  -     Ambulatory Referral to Rheumatology        Plan:  Pt will call if nasal congestion and seasonal allergy symptoms do not improve, or if they worsen.    Referral placed to see rheumatology at [facility] per pt request.    Discussed need for reduction in sodium/salt/caffeine intake; improve sleep habits as able; inc formal CV exercise program with goal of vigorous activity most, if not all, days of the week; goal of 50 min of sustained HR CV exercise.      Discussion Summary:    I spent 25 minutes caring for Becky on this  date of service. This time includes time spent by me in the following activities:preparing for the visit, performing a medically appropriate examination and/or evaluation , counseling and educating the patient/family/caregiver, ordering medications, tests, or procedures, documenting information in the medical record and care coordination     Discussed plan of care in detail with pt today; pt verb understanding and agrees.    Follow up:  No follow-ups on file.     There are no Patient Instructions on file for this visit.    Rueben Mercado DO  06/29/21  08:54 EDT          Please note that portions of this note may have been completed with a voice recognition program. Efforts were made to edit the dictations, but occasionally words are mistranscribed.

## 2021-07-18 DIAGNOSIS — G56.41 COMPLEX REGIONAL PAIN SYNDROME TYPE 2 OF RIGHT UPPER EXTREMITY: ICD-10-CM

## 2021-07-18 DIAGNOSIS — M79.18 CERVICAL MYOFASCIAL PAIN SYNDROME: Chronic | ICD-10-CM

## 2021-07-19 RX ORDER — GABAPENTIN 100 MG/1
CAPSULE ORAL
Qty: 60 CAPSULE | Refills: 0 | Status: SHIPPED | OUTPATIENT
Start: 2021-07-19 | End: 2021-08-16

## 2021-07-28 ENCOUNTER — TRANSCRIBE ORDERS (OUTPATIENT)
Dept: ADMINISTRATIVE | Facility: HOSPITAL | Age: 31
End: 2021-07-28

## 2021-07-28 DIAGNOSIS — M54.16 LUMBAR RADICULOPATHY: ICD-10-CM

## 2021-07-28 DIAGNOSIS — M51.36 DEGENERATION OF LUMBAR INTERVERTEBRAL DISC: Primary | ICD-10-CM

## 2021-08-15 DIAGNOSIS — G56.41 COMPLEX REGIONAL PAIN SYNDROME TYPE 2 OF RIGHT UPPER EXTREMITY: ICD-10-CM

## 2021-08-15 DIAGNOSIS — M79.18 CERVICAL MYOFASCIAL PAIN SYNDROME: Chronic | ICD-10-CM

## 2021-08-16 RX ORDER — GABAPENTIN 100 MG/1
CAPSULE ORAL
Qty: 60 CAPSULE | Refills: 2 | Status: SHIPPED | OUTPATIENT
Start: 2021-08-16 | End: 2021-09-21 | Stop reason: SDUPTHER

## 2021-08-19 ENCOUNTER — HOSPITAL ENCOUNTER (OUTPATIENT)
Dept: MRI IMAGING | Facility: HOSPITAL | Age: 31
Discharge: HOME | End: 2021-08-19
Admitting: ORTHOPAEDIC SURGERY

## 2021-08-19 DIAGNOSIS — M51.36 DEGENERATION OF LUMBAR INTERVERTEBRAL DISC: ICD-10-CM

## 2021-08-19 DIAGNOSIS — M54.16 LUMBAR RADICULOPATHY: ICD-10-CM

## 2021-08-19 PROCEDURE — 72148 MRI LUMBAR SPINE W/O DYE: CPT

## 2021-08-31 DIAGNOSIS — M79.18 CERVICAL MYOFASCIAL PAIN SYNDROME: Chronic | ICD-10-CM

## 2021-08-31 DIAGNOSIS — G56.41 COMPLEX REGIONAL PAIN SYNDROME TYPE 2 OF RIGHT UPPER EXTREMITY: ICD-10-CM

## 2021-09-01 RX ORDER — DULOXETIN HYDROCHLORIDE 60 MG/1
CAPSULE, DELAYED RELEASE ORAL
Qty: 90 CAPSULE | Refills: 0 | Status: SHIPPED | OUTPATIENT
Start: 2021-09-01 | End: 2021-12-20

## 2021-09-08 DIAGNOSIS — M79.18 CERVICAL MYOFASCIAL PAIN SYNDROME: Chronic | ICD-10-CM

## 2021-09-08 DIAGNOSIS — G56.41 COMPLEX REGIONAL PAIN SYNDROME TYPE 2 OF RIGHT UPPER EXTREMITY: ICD-10-CM

## 2021-09-10 RX ORDER — AMITRIPTYLINE HYDROCHLORIDE 10 MG/1
TABLET, FILM COATED ORAL
Qty: 90 TABLET | Refills: 0 | Status: SHIPPED | OUTPATIENT
Start: 2021-09-10 | End: 2021-12-20

## 2021-09-20 ENCOUNTER — LAB (OUTPATIENT)
Dept: LAB | Facility: HOSPITAL | Age: 31
End: 2021-09-20

## 2021-09-20 ENCOUNTER — TRANSCRIBE ORDERS (OUTPATIENT)
Dept: LAB | Facility: HOSPITAL | Age: 31
End: 2021-09-20

## 2021-09-20 DIAGNOSIS — Z20.822 COVID-19 RULED OUT: Primary | ICD-10-CM

## 2021-09-20 DIAGNOSIS — Z20.822 COVID-19 RULED OUT: ICD-10-CM

## 2021-09-20 PROCEDURE — U0004 COV-19 TEST NON-CDC HGH THRU: HCPCS

## 2021-09-21 DIAGNOSIS — M79.18 CERVICAL MYOFASCIAL PAIN SYNDROME: Chronic | ICD-10-CM

## 2021-09-21 DIAGNOSIS — G56.41 COMPLEX REGIONAL PAIN SYNDROME TYPE 2 OF RIGHT UPPER EXTREMITY: ICD-10-CM

## 2021-09-21 LAB — SARS-COV-2 RNA NOSE QL NAA+PROBE: NOT DETECTED

## 2021-09-21 RX ORDER — GABAPENTIN 100 MG/1
200 CAPSULE ORAL NIGHTLY
Qty: 60 CAPSULE | Refills: 2 | Status: SHIPPED | OUTPATIENT
Start: 2021-09-21 | End: 2022-01-31 | Stop reason: SDUPTHER

## 2021-12-18 DIAGNOSIS — M79.18 CERVICAL MYOFASCIAL PAIN SYNDROME: Chronic | ICD-10-CM

## 2021-12-18 DIAGNOSIS — G56.41 COMPLEX REGIONAL PAIN SYNDROME TYPE 2 OF RIGHT UPPER EXTREMITY: ICD-10-CM

## 2021-12-20 RX ORDER — DULOXETIN HYDROCHLORIDE 60 MG/1
CAPSULE, DELAYED RELEASE ORAL
Qty: 90 CAPSULE | Refills: 0 | Status: SHIPPED | OUTPATIENT
Start: 2021-12-20 | End: 2022-03-28

## 2021-12-20 RX ORDER — AMITRIPTYLINE HYDROCHLORIDE 10 MG/1
TABLET, FILM COATED ORAL
Qty: 90 TABLET | Refills: 0 | Status: SHIPPED | OUTPATIENT
Start: 2021-12-20 | End: 2022-03-28

## 2021-12-23 ENCOUNTER — APPOINTMENT (OUTPATIENT)
Dept: CT IMAGING | Facility: HOSPITAL | Age: 31
End: 2021-12-23

## 2021-12-23 ENCOUNTER — HOSPITAL ENCOUNTER (EMERGENCY)
Facility: HOSPITAL | Age: 31
Discharge: HOME | End: 2021-12-23
Attending: FAMILY MEDICINE | Admitting: FAMILY MEDICINE

## 2021-12-23 ENCOUNTER — APPOINTMENT (OUTPATIENT)
Dept: GENERAL RADIOLOGY | Facility: HOSPITAL | Age: 31
End: 2021-12-23

## 2021-12-23 VITALS
HEIGHT: 66 IN | BODY MASS INDEX: 41.91 KG/M2 | HEART RATE: 69 BPM | WEIGHT: 260.8 LBS | RESPIRATION RATE: 18 BRPM | SYSTOLIC BLOOD PRESSURE: 142 MMHG | TEMPERATURE: 98.3 F | OXYGEN SATURATION: 100 % | DIASTOLIC BLOOD PRESSURE: 87 MMHG

## 2021-12-23 DIAGNOSIS — N39.0 ACUTE UTI: Primary | ICD-10-CM

## 2021-12-23 DIAGNOSIS — N83.209 CYST OF OVARY, UNSPECIFIED LATERALITY: ICD-10-CM

## 2021-12-23 LAB
ALBUMIN SERPL-MCNC: 4 G/DL (ref 3.5–5.2)
ALBUMIN/GLOB SERPL: 1.1 G/DL
ALP SERPL-CCNC: 108 U/L (ref 39–117)
ALT SERPL W P-5'-P-CCNC: 8 U/L (ref 1–33)
ANION GAP SERPL CALCULATED.3IONS-SCNC: 10.1 MMOL/L (ref 5–15)
AST SERPL-CCNC: 15 U/L (ref 1–32)
BACTERIA UR QL AUTO: ABNORMAL /HPF
BASOPHILS # BLD AUTO: 0.09 10*3/MM3 (ref 0–0.2)
BASOPHILS NFR BLD AUTO: 0.9 % (ref 0–1.5)
BILIRUB SERPL-MCNC: 0.3 MG/DL (ref 0–1.2)
BILIRUB UR QL STRIP: NEGATIVE
BUN SERPL-MCNC: 9 MG/DL (ref 6–20)
BUN/CREAT SERPL: 11.3 (ref 7–25)
CALCIUM SPEC-SCNC: 9.1 MG/DL (ref 8.6–10.5)
CHLORIDE SERPL-SCNC: 101 MMOL/L (ref 98–107)
CLARITY UR: ABNORMAL
CO2 SERPL-SCNC: 23.9 MMOL/L (ref 22–29)
COLOR UR: YELLOW
CREAT SERPL-MCNC: 0.8 MG/DL (ref 0.57–1)
DEPRECATED RDW RBC AUTO: 42.8 FL (ref 37–54)
EOSINOPHIL # BLD AUTO: 0.25 10*3/MM3 (ref 0–0.4)
EOSINOPHIL NFR BLD AUTO: 2.6 % (ref 0.3–6.2)
ERYTHROCYTE [DISTWIDTH] IN BLOOD BY AUTOMATED COUNT: 14 % (ref 12.3–15.4)
GFR SERPL CREATININE-BSD FRML MDRD: 84 ML/MIN/1.73
GLOBULIN UR ELPH-MCNC: 3.5 GM/DL
GLUCOSE SERPL-MCNC: 101 MG/DL (ref 65–99)
GLUCOSE UR STRIP-MCNC: NEGATIVE MG/DL
HCG SERPL QL: NEGATIVE
HCT VFR BLD AUTO: 39 % (ref 34–46.6)
HGB BLD-MCNC: 12.3 G/DL (ref 12–15.9)
HGB UR QL STRIP.AUTO: ABNORMAL
HOLD SPECIMEN: NORMAL
HYALINE CASTS UR QL AUTO: ABNORMAL /LPF
IMM GRANULOCYTES # BLD AUTO: 0.02 10*3/MM3 (ref 0–0.05)
IMM GRANULOCYTES NFR BLD AUTO: 0.2 % (ref 0–0.5)
KETONES UR QL STRIP: NEGATIVE
LEUKOCYTE ESTERASE UR QL STRIP.AUTO: ABNORMAL
LYMPHOCYTES # BLD AUTO: 2.11 10*3/MM3 (ref 0.7–3.1)
LYMPHOCYTES NFR BLD AUTO: 21.9 % (ref 19.6–45.3)
MCH RBC QN AUTO: 26.5 PG (ref 26.6–33)
MCHC RBC AUTO-ENTMCNC: 31.5 G/DL (ref 31.5–35.7)
MCV RBC AUTO: 84.1 FL (ref 79–97)
MONOCYTES # BLD AUTO: 0.65 10*3/MM3 (ref 0.1–0.9)
MONOCYTES NFR BLD AUTO: 6.7 % (ref 5–12)
NEUTROPHILS NFR BLD AUTO: 6.51 10*3/MM3 (ref 1.7–7)
NEUTROPHILS NFR BLD AUTO: 67.7 % (ref 42.7–76)
NITRITE UR QL STRIP: NEGATIVE
NRBC BLD AUTO-RTO: 0 /100 WBC (ref 0–0.2)
PH UR STRIP.AUTO: 5.5 [PH] (ref 5–8)
PLATELET # BLD AUTO: 233 10*3/MM3 (ref 140–450)
PMV BLD AUTO: 9.4 FL (ref 6–12)
POTASSIUM SERPL-SCNC: 3.9 MMOL/L (ref 3.5–5.2)
PROT SERPL-MCNC: 7.5 G/DL (ref 6–8.5)
PROT UR QL STRIP: NEGATIVE
RBC # BLD AUTO: 4.64 10*6/MM3 (ref 3.77–5.28)
RBC # UR STRIP: ABNORMAL /HPF
REF LAB TEST METHOD: ABNORMAL
SODIUM SERPL-SCNC: 135 MMOL/L (ref 136–145)
SP GR UR STRIP: 1.01 (ref 1–1.03)
SQUAMOUS #/AREA URNS HPF: ABNORMAL /HPF
TROPONIN T SERPL-MCNC: <0.01 NG/ML (ref 0–0.03)
UROBILINOGEN UR QL STRIP: ABNORMAL
WBC # UR STRIP: ABNORMAL /HPF
WBC NRBC COR # BLD: 9.63 10*3/MM3 (ref 3.4–10.8)
WHOLE BLOOD HOLD SPECIMEN: NORMAL
WHOLE BLOOD HOLD SPECIMEN: NORMAL

## 2021-12-23 PROCEDURE — 84703 CHORIONIC GONADOTROPIN ASSAY: CPT | Performed by: FAMILY MEDICINE

## 2021-12-23 PROCEDURE — 99283 EMERGENCY DEPT VISIT LOW MDM: CPT

## 2021-12-23 PROCEDURE — 81001 URINALYSIS AUTO W/SCOPE: CPT | Performed by: PHYSICIAN ASSISTANT

## 2021-12-23 PROCEDURE — 74176 CT ABD & PELVIS W/O CONTRAST: CPT

## 2021-12-23 PROCEDURE — 80053 COMPREHEN METABOLIC PANEL: CPT | Performed by: FAMILY MEDICINE

## 2021-12-23 PROCEDURE — 87086 URINE CULTURE/COLONY COUNT: CPT | Performed by: PHYSICIAN ASSISTANT

## 2021-12-23 PROCEDURE — 96365 THER/PROPH/DIAG IV INF INIT: CPT

## 2021-12-23 PROCEDURE — 85025 COMPLETE CBC W/AUTO DIFF WBC: CPT | Performed by: FAMILY MEDICINE

## 2021-12-23 PROCEDURE — 96375 TX/PRO/DX INJ NEW DRUG ADDON: CPT

## 2021-12-23 PROCEDURE — 93005 ELECTROCARDIOGRAM TRACING: CPT | Performed by: FAMILY MEDICINE

## 2021-12-23 PROCEDURE — 84484 ASSAY OF TROPONIN QUANT: CPT | Performed by: FAMILY MEDICINE

## 2021-12-23 PROCEDURE — 71045 X-RAY EXAM CHEST 1 VIEW: CPT

## 2021-12-23 PROCEDURE — 25010000002 CEFTRIAXONE SODIUM-DEXTROSE 1-3.74 GM-%(50ML) RECONSTITUTED SOLUTION: Performed by: PHYSICIAN ASSISTANT

## 2021-12-23 RX ORDER — FAMOTIDINE 10 MG/ML
20 INJECTION, SOLUTION INTRAVENOUS ONCE
Status: COMPLETED | OUTPATIENT
Start: 2021-12-23 | End: 2021-12-23

## 2021-12-23 RX ORDER — ASPIRIN 81 MG/1
324 TABLET, CHEWABLE ORAL ONCE
Status: COMPLETED | OUTPATIENT
Start: 2021-12-23 | End: 2021-12-23

## 2021-12-23 RX ORDER — SODIUM CHLORIDE 0.9 % (FLUSH) 0.9 %
10 SYRINGE (ML) INJECTION AS NEEDED
Status: DISCONTINUED | OUTPATIENT
Start: 2021-12-23 | End: 2021-12-24 | Stop reason: HOSPADM

## 2021-12-23 RX ORDER — ONDANSETRON 4 MG/1
4 TABLET, ORALLY DISINTEGRATING ORAL EVERY 8 HOURS PRN
Qty: 12 TABLET | Refills: 0 | OUTPATIENT
Start: 2021-12-23 | End: 2022-05-12

## 2021-12-23 RX ORDER — ASPIRIN 325 MG
325 TABLET ORAL ONCE
Status: DISCONTINUED | OUTPATIENT
Start: 2021-12-23 | End: 2021-12-23

## 2021-12-23 RX ORDER — HYDROXYCHLOROQUINE SULFATE 200 MG/1
400 TABLET, FILM COATED ORAL DAILY
COMMUNITY
End: 2022-05-12

## 2021-12-23 RX ORDER — AMOXICILLIN 875 MG/1
875 TABLET, COATED ORAL 2 TIMES DAILY
Qty: 10 TABLET | Refills: 0 | Status: SHIPPED | OUTPATIENT
Start: 2021-12-23 | End: 2021-12-28

## 2021-12-23 RX ORDER — CEFTRIAXONE 1 G/50ML
1 INJECTION, SOLUTION INTRAVENOUS ONCE
Status: COMPLETED | OUTPATIENT
Start: 2021-12-23 | End: 2021-12-23

## 2021-12-23 RX ADMIN — ASPIRIN 81 MG CHEWABLE TABLET 324 MG: 81 TABLET CHEWABLE at 19:44

## 2021-12-23 RX ADMIN — LIDOCAINE HYDROCHLORIDE: 20 SOLUTION ORAL; TOPICAL at 19:43

## 2021-12-23 RX ADMIN — FAMOTIDINE 20 MG: 10 INJECTION INTRAVENOUS at 19:43

## 2021-12-23 RX ADMIN — CEFTRIAXONE 1 G: 1 INJECTION, SOLUTION INTRAVENOUS at 22:01

## 2021-12-23 RX ADMIN — SODIUM CHLORIDE 1000 ML: 9 INJECTION, SOLUTION INTRAVENOUS at 19:50

## 2021-12-24 NOTE — ED PROVIDER NOTES
Subjective   History of Present Illness   Patient is a 31-year-old female with history of depression, rheumatoid arthritis, and migraines presenting to the ER with complaints of chest pain that began 3 hours ago.  She describes the pain as achy and tight.  She denies associated shortness of breath.  She states that she is nauseous but has been dealing with a UTI for a month.  Patient states that she was placed on cefdinir and the infection was resistant to this.  She states that she recently was prescribed amoxicillin and states that her symptoms are still there.  She is concerned that she still has a UTI.  She states that she has mild tenderness to her lower abdomen and painful urination.  She denies fevers, recent sick contacts, and additional symptoms or complaints at this time.  She denies over-the-counter medications prior to arrival.  She denies recent injuries or strenuous activity that could have caused chest wall pain.    Review of Systems   Respiratory: Positive for chest tightness.    Gastrointestinal: Positive for abdominal pain.   Genitourinary: Positive for dysuria.   All other systems reviewed and are negative.      Past Medical History:   Diagnosis Date   • Depression    • Hx of migraine headaches    • Rheumatoid arthritis (CMS/HCC)        Allergies   Allergen Reactions   • Zithromax [Azithromycin] Anaphylaxis   • Mucinex Dm  [Dextromethorphan-Guaifenesin] Hives       Past Surgical History:   Procedure Laterality Date   • EYE SURGERY  2007    upper eyelid    • EYE SURGERY  2015    upper eyelid       Family History   Problem Relation Age of Onset   • Hypertension Mother    • Hypertension Father    • Cancer Maternal Grandmother    • Arthritis Paternal Grandmother    • Cancer Paternal Grandfather        Social History     Socioeconomic History   • Marital status: Single   Tobacco Use   • Smoking status: Never Smoker   • Smokeless tobacco: Never Used   Substance and Sexual Activity   • Alcohol use: No    • Drug use: No   • Sexual activity: Defer           Objective   Physical Exam  Vitals and nursing note reviewed.   Constitutional:       General: She is not in acute distress.     Appearance: She is not toxic-appearing.   HENT:      Head: Normocephalic and atraumatic.   Cardiovascular:      Rate and Rhythm: Tachycardia present.      Heart sounds: Normal heart sounds.   Pulmonary:      Effort: Pulmonary effort is normal.      Breath sounds: Normal breath sounds.   Abdominal:      General: Bowel sounds are normal.      Palpations: Abdomen is soft.      Tenderness: There is abdominal tenderness (suprapubic). There is no guarding or rebound.   Musculoskeletal:         General: Normal range of motion.      Cervical back: Normal range of motion and neck supple.      Comments: TTP of chest wall   Skin:     General: Skin is warm and dry.   Neurological:      General: No focal deficit present.      Mental Status: She is alert and oriented to person, place, and time.   Psychiatric:         Mood and Affect: Mood normal.         Behavior: Behavior normal.         Procedures           ED Course  ED Course as of 12/23/21 2239   u Dec 23, 2021   1922 EKG interpretation time is 1922 normal sinus rhythm 85 bpm QRS durations 84 QT is 364 QTC is 407 no evidence of acute ST elevation or depression. [MH]   2217 HCG Qualitative: Negative [AP]   2217 Troponin T: <0.010 [AP]   2217 WBC: 9.63 [AP]   2217 RBC: 4.64 [AP]   2217 Hemoglobin: 12.3 [AP]   2217 Hematocrit: 39.0 [AP]   2217 Platelets: 233 [AP]   2217 Color, UA: Yellow [AP]   2217 Appearance, UA(!): Cloudy [AP]   2217 pH, UA: 5.5 [AP]   2217 Specific Gravity, UA: 1.007 [AP]   2217 Glucose: Negative [AP]   2217 Ketones, UA: Negative [AP]   2217 Bilirubin, UA: Negative [AP]   2217 Blood, UA(!): Trace [AP]   2217 Protein, UA: Negative [AP]   2217 Leukocytes, UA(!): Large (3+) [AP]   2217 Nitrite, UA: Negative [AP]   2217 Urobilinogen, UA: 0.2 E.U./dL [AP]   2217 RBC, UA(!): 0-2  [AP]   2217 WBC, UA(!): 6-12 [AP]   2217 Bacteria, UA(!): Trace [AP]   2217 Squamous Epithelial Cells, UA(!): 3-6 [AP]   2217 Hyaline Casts, UA: 0-2 [AP]   2217 Methodology:: Manual Light Microscopy [AP]   2217 Glucose(!): 101 [AP]   2217 BUN: 9 [AP]   2217 Creatinine: 0.80 [AP]   2217 Sodium(!): 135 [AP]   2217 Potassium: 3.9 [AP]   2217 Chloride: 101 [AP]   2217 CO2: 23.9 [AP]   2217 Calcium: 9.1 [AP]   2217 Total Protein: 7.5 [AP]   2217 Albumin: 4.00 [AP]   2217 ALT (SGPT): 8 [AP]   2217 AST (SGOT): 15 [AP]   2217 Alkaline Phosphatase: 108 [AP]   2217 Total Bilirubin: 0.3 [AP]   2217 eGFR Non  Am: 84 [AP]   2217 Globulin: 3.5 [AP]   2217 A/G Ratio: 1.1 [AP]   2217 BUN/Creatinine Ratio: 11.3 [AP]   2217 Anion Gap: 10.1 [AP]      ED Course User Index  [AP] Brook Herrera PA-C  [] Maria R Lockhart DO                                                 [facility]   Patient was evaluated in the ER for chest pain, abdominal pain, and nausea.  Patient is hemodynamically stable, nontoxic-appearing on exam.  Troponin is normal.  EKG with no ischemic findings.  Patient's heart score is 1.  Patient's chest is TTP.  Patient has been diagnosed with UTI and been on 2 different antibiotics.  She was given Cefdinir and is currently on Amoxicillin with 1 day of antibiotic left.  She states that urine culture was performed at the clinic she went to and grew group B strep.  Patient states she is still having urinary symptoms.  Urinalysis revealed large leukocytes and bacteriuria.  Urine culture was ordered.  CT of abdomen and pelvis was performed and revealed an ovarian cyst but was otherwise unremarkable.  Patient was advised to follow-up with OB/GYN regarding the ovarian cyst.  She was given 1 g IV Rocephin in the ER as well as Pepcid, aspirin, and GI cocktail.  She was given 5 days of additional amoxicillin treatment.  She was advised to follow-up with her PCP as soon as possible.  Precautions were given for return to  the ER for any new or worsening symptoms.    Final diagnoses:   Acute UTI   Cyst of ovary, unspecified laterality       ED Disposition  ED Disposition     ED Disposition Condition Comment    Discharge Stable           Reuben Mercado, DO  [address] [place] [address]  [phone]    Schedule an appointment as soon as possible for a visit   for re-evaluation of today's complaint    [facility] Emergency Department  [address] [facility] [address] [phone]  Go to   As needed, If symptoms worsen    Vasquez Lo MD  [address] [facility]  [address]  [facility] [address]  [phone]    Schedule an appointment as soon as possible for a visit   for re-evaluation of ovarian cyst         Medication List      New Prescriptions    amoxicillin 875 MG tablet  Commonly known as: AMOXIL  Take 1 tablet by mouth 2 (Two) Times a Day for 5 days.     ondansetron ODT 4 MG disintegrating tablet  Commonly known as: ZOFRAN-ODT  Place 1 tablet on the tongue Every 8 (Eight) Hours As Needed for Nausea or Vomiting.           Where to Get Your Medications      These medications were sent to [facility] [address] [place] [address] - [address] [facility]. - [phone]  - [phone] [address] [facility] [address]    Phone: [phone]   · amoxicillin 875 MG tablet  · ondansetron ODT 4 MG disintegrating tablet          Brook Herrera PA-C  12/23/21 8843

## 2021-12-25 LAB — BACTERIA SPEC AEROBE CULT: NORMAL

## 2022-01-23 PROCEDURE — U0004 COV-19 TEST NON-CDC HGH THRU: HCPCS | Performed by: NURSE PRACTITIONER

## 2022-01-31 ENCOUNTER — TELEMEDICINE (OUTPATIENT)
Dept: FAMILY MEDICINE CLINIC | Facility: CLINIC | Age: 32
End: 2022-01-31

## 2022-01-31 DIAGNOSIS — G56.41 COMPLEX REGIONAL PAIN SYNDROME TYPE 2 OF RIGHT UPPER EXTREMITY: Primary | ICD-10-CM

## 2022-01-31 DIAGNOSIS — M79.18 CERVICAL MYOFASCIAL PAIN SYNDROME: Chronic | ICD-10-CM

## 2022-01-31 DIAGNOSIS — F39 MOOD DISORDER: ICD-10-CM

## 2022-01-31 PROCEDURE — 99213 OFFICE O/P EST LOW 20 MIN: CPT | Performed by: FAMILY MEDICINE

## 2022-01-31 RX ORDER — GABAPENTIN 400 MG/1
400 CAPSULE ORAL NIGHTLY
Qty: 60 CAPSULE | Refills: 0 | Status: SHIPPED | OUTPATIENT
Start: 2022-01-31 | End: 2022-04-05

## 2022-01-31 NOTE — PROGRESS NOTES
Established Patient        Chief Complaint: CRPS/mood disorder.       Becky Garcia is a 31 y.o. female    History of Present Illness:   Answers for HPI/ROS submitted by the patient on 1/31/2022  Please describe your symptoms.: Pain in right shoulder and right leg  Have you had these symptoms before?: Yes  How long have you been having these symptoms?: Greater than 2 weeks  Please list any medications you are currently taking for this condition.: Gabapentin. 200mg  Please describe any probable cause for these symptoms. : Nerve pain  What is the primary reason for your visit?: Other    Here today for a scheduled/requested virtual visit utilizing both audio and video components secondary to the viral pandemic and current healthcare guidelines.    Patient denies any problems with her current medications.  Currently being seen by rheumatologist at the [facility], discontinued use of methotrexate and folic acid supplementation, instead now only utilizing hydroxychloroquine, 400 mg once daily dosing.  She reports significant improvement to her myalgias and arthralgias with this transition, she does state she has noticed decreased effectiveness of her gabapentin however.    She denies any new injuries or rashes.  Denies fever, chills or night sweats.  She has recently changed jobs, which she thinks has been helpful with her mood stability and overall feeling of wellbeing.    Subjective     The following portions of the patient's history were reviewed and updated as appropriate: allergies, current medications, past family history, past medical history, past social history, past surgical history and problem list.    Allergies   Allergen Reactions   • Zithromax [Azithromycin] Anaphylaxis   • Mucinex Dm  [Dextromethorphan-Guaifenesin] Hives       Review of Systems  1. Constitutional: Negative for fever. Negative for chills, diaphoresis, fatigue and unexpected weight change.   2. HENT: No  dysphagia; no changes to vision/hearing/smell/taste; no epistaxis  3. Eyes: Negative for redness and visual disturbance.   4. Respiratory: negative for shortness of breath. Negative for chest pain . Negative for cough and chest tightness.   5. Cardiovascular: Negative for chest pain and palpitations.   6. Gastrointestinal: Negative for abdominal distention, abdominal pain and blood in stool.   7. Endocrine: Negative for cold intolerance and heat intolerance.   8. Genitourinary: Negative for difficulty urinating, dysuria and frequency.   9. Musculoskeletal: Chronic arthralgias and myalgias.   10. Skin: Negative for color change, rash and wound.   11. Neurological: Negative for syncope, weakness and headaches.   12. Hematological: Negative for adenopathy. Does not bruise/bleed easily.   13. Psychiatric/Behavioral: Negative for confusion. The patient is not nervous/anxious.    Objective     Physical Exam   Vital Signs: There were no vitals taken for this visit.    General Appearance: alert, oriented x 3, no acute distress.  Skin: warm and dry.   HEENT: Atraumatic.  pupils round and reactive to light and accommodation, oral mucosa pink and moist.  Nares patent without epistaxis.  External auditory canals are patent tympanic membranes intact.  Neck: supple, no JVD, trachea midline.  No thyromegaly  Lungs: CTA, unlabored breathing effort.  Heart: RRR, normal S1 and S2, no S3, no rub.  Abdomen: soft, non-tender, no palpable bladder, present bowel sounds to auscultation ×4.  No guarding or rigidity.  Extremities: no clubbing, cyanosis or edema.  Good range of motion actively and passively.  Symmetric muscle strength and development  Neuro: normal speech and mental status.  Cranial nerves II through XII intact.  No anosmia. DTR 2+; proprioception intact.  No focal motor/sensory deficits.    Assessment and Plan      Assessment/Plan:   Diagnoses and all orders for this visit:    1. Complex regional pain syndrome type 2 of  right upper extremity (Primary)  -     gabapentin (NEURONTIN) 400 MG capsule; Take 1 capsule by mouth Every Night.  Dispense: 60 capsule; Refill: 0    2. Mood disorder (HCC)    3. Cervical myofascial pain syndrome  -     gabapentin (NEURONTIN) 400 MG capsule; Take 1 capsule by mouth Every Night.  Dispense: 60 capsule; Refill: 0      Keep scheduled f/u appt with rheumatology later in February; had scheduled/surveillance eye exam approx 3 weeks ago, without abnml findings; continue HCQ    Planned inc in dosing of gabapentin; will titrate to 400 mg bid dosing and follow clinically.    Discussed need for stress/anxiety reducing techniques such as prayer/meditation/breathing and counting exercises and avoidance of stress producing environments/situations; will follow clinically.      Discussion Summary:     I spent 25 minutes caring for Becky on this date of service. This time includes time spent by me in the following activities:preparing for the visit, performing a medically appropriate examination and/or evaluation , counseling and educating the patient/family/caregiver, ordering medications, tests, or procedures, documenting information in the medical record and care coordination    Discussed plan of care in detail with pt today; pt verb understanding and agrees.  Follow up:  Return in about 3 months (around 4/30/2022) for Recheck, Med Change/New Meds.     There are no Patient Instructions on file for this visit.    Reuben Mercado,   01/31/22  15:57 EST          Please note that portions of this note may have been completed with a voice recognition program. Efforts were made to edit the dictations, but occasionally words are mistranscribed.

## 2022-03-26 DIAGNOSIS — M79.18 CERVICAL MYOFASCIAL PAIN SYNDROME: Chronic | ICD-10-CM

## 2022-03-26 DIAGNOSIS — G56.41 COMPLEX REGIONAL PAIN SYNDROME TYPE 2 OF RIGHT UPPER EXTREMITY: ICD-10-CM

## 2022-03-28 RX ORDER — AMITRIPTYLINE HYDROCHLORIDE 10 MG/1
TABLET, FILM COATED ORAL
Qty: 90 TABLET | Refills: 0 | Status: SHIPPED | OUTPATIENT
Start: 2022-03-28 | End: 2022-08-16

## 2022-03-28 RX ORDER — DULOXETIN HYDROCHLORIDE 60 MG/1
CAPSULE, DELAYED RELEASE ORAL
Qty: 90 CAPSULE | Refills: 0 | Status: SHIPPED | OUTPATIENT
Start: 2022-03-28 | End: 2022-05-12

## 2022-03-29 PROCEDURE — U0004 COV-19 TEST NON-CDC HGH THRU: HCPCS | Performed by: PHYSICIAN ASSISTANT

## 2022-04-05 DIAGNOSIS — M79.18 CERVICAL MYOFASCIAL PAIN SYNDROME: Chronic | ICD-10-CM

## 2022-04-05 DIAGNOSIS — G56.41 COMPLEX REGIONAL PAIN SYNDROME TYPE 2 OF RIGHT UPPER EXTREMITY: ICD-10-CM

## 2022-04-05 RX ORDER — GABAPENTIN 400 MG/1
CAPSULE ORAL
Qty: 60 CAPSULE | Refills: 2 | Status: SHIPPED | OUTPATIENT
Start: 2022-04-05 | End: 2022-12-14 | Stop reason: SDUPTHER

## 2022-05-23 ENCOUNTER — HOSPITAL ENCOUNTER (EMERGENCY)
Facility: HOSPITAL | Age: 32
Discharge: HOME | End: 2022-05-23
Attending: EMERGENCY MEDICINE | Admitting: EMERGENCY MEDICINE

## 2022-05-23 VITALS
DIASTOLIC BLOOD PRESSURE: 81 MMHG | RESPIRATION RATE: 16 BRPM | HEIGHT: 66 IN | SYSTOLIC BLOOD PRESSURE: 117 MMHG | HEART RATE: 90 BPM | OXYGEN SATURATION: 97 % | BODY MASS INDEX: 38.57 KG/M2 | TEMPERATURE: 97.8 F | WEIGHT: 240 LBS

## 2022-05-23 DIAGNOSIS — S23.9XXA THORACIC SPRAIN: Primary | ICD-10-CM

## 2022-05-23 PROCEDURE — 99283 EMERGENCY DEPT VISIT LOW MDM: CPT

## 2022-05-23 PROCEDURE — 63710000001 ONDANSETRON ODT 4 MG TABLET DISPERSIBLE: Performed by: EMERGENCY MEDICINE

## 2022-05-23 RX ORDER — TRAMADOL HYDROCHLORIDE 50 MG/1
50 TABLET ORAL ONCE
Status: COMPLETED | OUTPATIENT
Start: 2022-05-23 | End: 2022-05-23

## 2022-05-23 RX ORDER — ONDANSETRON 4 MG/1
4 TABLET, ORALLY DISINTEGRATING ORAL ONCE
Status: COMPLETED | OUTPATIENT
Start: 2022-05-23 | End: 2022-05-23

## 2022-05-23 RX ORDER — ACETAMINOPHEN AND CODEINE PHOSPHATE 300; 30 MG/1; MG/1
1 TABLET ORAL EVERY 6 HOURS PRN
Qty: 12 TABLET | Refills: 0 | Status: SHIPPED | OUTPATIENT
Start: 2022-05-23 | End: 2023-01-31

## 2022-05-23 RX ADMIN — TRAMADOL HYDROCHLORIDE 50 MG: 50 TABLET, COATED ORAL at 07:50

## 2022-05-23 RX ADMIN — ONDANSETRON 4 MG: 4 TABLET, ORALLY DISINTEGRATING ORAL at 07:50

## 2022-05-23 NOTE — ED PROVIDER NOTES
Subjective   History of Present Illness    Chief Complaint: Back pain  History of Present Illness: 31-year-old female presents with mid thoracic posterior back pain status post fall 2 weeks ago.  States she is nauseated, seen and evaluated by orthopedist who added plain films, family advised her x-rays were reported to have no fracture, some scoliosis.  Reports she was advised to start PT, is anti-inflammatories and muscle relaxers, persistent symptoms.  History of RA.  No weakness no incontinence  Onset: 2 weeks  Duration: Persistent  Exacerbating / Alleviating factors: Worse with range of motion  Associated symptoms: None      Nurses Notes reviewed and agree, including vitals, allergies, social history and prior medical history.     REVIEW OF SYSTEMS: All systems reviewed and not pertinent unless noted.    Positive for: Back pain, nausea status post fall    Negative for: Chest pain shortness of breath cough hemoptysis syncope  Review of Systems    Past Medical History:   Diagnosis Date   • Depression    • Hx of migraine headaches    • Rheumatoid arthritis (HCC)        Allergies   Allergen Reactions   • Zithromax [Azithromycin] Anaphylaxis   • Mucinex Dm  [Dextromethorphan-Guaifenesin] Hives       Past Surgical History:   Procedure Laterality Date   • EYE SURGERY  2007    upper eyelid    • EYE SURGERY  2015    upper eyelid       Family History   Problem Relation Age of Onset   • Hypertension Mother    • Hypertension Father    • Cancer Maternal Grandmother    • Arthritis Paternal Grandmother    • Cancer Paternal Grandfather        Social History     Socioeconomic History   • Marital status: Single   Tobacco Use   • Smoking status: Never Smoker   • Smokeless tobacco: Never Used   Substance and Sexual Activity   • Alcohol use: No   • Drug use: No   • Sexual activity: Defer           Objective   Physical Exam    CONSTITUTIONAL: Well developed, nontoxic obese 31-year-old  female,  in no acute distress.  VITAL  SIGNS: per nursing, reviewed and noted  SKIN: exposed skin with no rashes, ulcerations or petechiae.  EYES: perrla. EOMI.  ENT: Normal voice.  Patient maintained wearing a mask throughout patient encounter due to coronavirus pandemic  RESPIRATORY:  No increased work of breathing. No retractions.   CARDIOVASCULAR:  regular rate and rhythm, no murmurs.  Good Peripheral pulses. Good cap refill to extremities.   GI: Abdomen soft, nontender, normal bowel sounds. No hernia. No ascites.  MUSCULOSKELETAL: Reproducible tenderness palpation of the midline thoracic spine associated paraspinal spasm.  No focal weakness no saddle anesthesia  NEUROLOGIC: Alert, oriented x 3. No gross deficits. GCS 15.   PSYCH: appropriate affect.  : no bladder tenderness or distention, no CVA tenderness      Procedures     none      ED Course                                   NICHOLAS reviewed by [place],              MDM  31-year-old female presents with ongoing back pain status post fall 2 weeks prior.  Continue discomfort with muscle relaxers and anti-inflammatories, takes Neurontin.  Patient is neurovascularly intact.  Reports negative plain films per orthopedist visit.  No indications for emergent MRI.  We will add short course tylenol 3, Zofran, outpatient follow-up, continue recommendations per her orthopedist.  Final diagnoses:   Thoracic sprain       ED Disposition  ED Disposition     ED Disposition   Discharge    Condition   Stable    Comment   --             Reuben Mercado,   [address] [place] [address] [phone]          Your orthopedist               Medication List      New Prescriptions    acetaminophen-codeine 300-30 MG per tablet  Commonly known as: TYLENOL #3  Take 1 tablet by mouth Every 6 (Six) Hours As Needed for Moderate Pain .           Where to Get Your Medications      These medications were sent to [facility]  [place], [place] - [address] [facility] - [phone]  -  [phone] FX  [address] [facility] [address]    Phone: [phone]   · acetaminophen-codeine 300-30 MG per tablet          [place] DO  05/23/22 0805

## 2022-08-15 DIAGNOSIS — M79.18 CERVICAL MYOFASCIAL PAIN SYNDROME: Chronic | ICD-10-CM

## 2022-08-15 DIAGNOSIS — G56.41 COMPLEX REGIONAL PAIN SYNDROME TYPE 2 OF RIGHT UPPER EXTREMITY: ICD-10-CM

## 2022-08-16 RX ORDER — AMITRIPTYLINE HYDROCHLORIDE 10 MG/1
TABLET, FILM COATED ORAL
Qty: 90 TABLET | Refills: 0 | Status: SHIPPED | OUTPATIENT
Start: 2022-08-16

## 2022-08-18 DIAGNOSIS — G56.41 COMPLEX REGIONAL PAIN SYNDROME TYPE 2 OF RIGHT UPPER EXTREMITY: ICD-10-CM

## 2022-08-18 DIAGNOSIS — M79.18 CERVICAL MYOFASCIAL PAIN SYNDROME: Chronic | ICD-10-CM

## 2022-08-19 RX ORDER — AMITRIPTYLINE HYDROCHLORIDE 10 MG/1
TABLET, FILM COATED ORAL
Qty: 90 TABLET | Refills: 0 | OUTPATIENT
Start: 2022-08-19

## 2022-08-25 ENCOUNTER — TELEPHONE (OUTPATIENT)
Dept: FAMILY MEDICINE CLINIC | Facility: CLINIC | Age: 32
End: 2022-08-25

## 2022-08-25 NOTE — TELEPHONE ENCOUNTER
Caller: Becky Garcia    Relationship to patient: Self    Best call back number: [phone]    Date of exposure: UNKNOWN    Date of positive COVID19 test: 08/25/22    Date if possible COVID19 exposure: UNKNOWN    COVID19 symptoms: SHORTNESS OF BREATH, BRONCHITIS    Date of initial quarantine: UNKNOWN    Additional information or concerns: PLEASE ADVISE PATIENT IF NEEDED    HUB [employer]ARM TRANSFERRED TO OFFICE    What is the patients preferred pharmacy:     [facility]   [place], [place] - [address] AT [facility] [phone] [facility] [phone]

## 2022-08-28 ENCOUNTER — APPOINTMENT (OUTPATIENT)
Dept: CT IMAGING | Facility: HOSPITAL | Age: 32
End: 2022-08-28

## 2022-08-28 ENCOUNTER — APPOINTMENT (OUTPATIENT)
Dept: GENERAL RADIOLOGY | Facility: HOSPITAL | Age: 32
End: 2022-08-28

## 2022-08-28 ENCOUNTER — HOSPITAL ENCOUNTER (EMERGENCY)
Facility: HOSPITAL | Age: 32
Discharge: HOME | End: 2022-08-28
Attending: EMERGENCY MEDICINE | Admitting: EMERGENCY MEDICINE

## 2022-08-28 VITALS
SYSTOLIC BLOOD PRESSURE: 138 MMHG | HEIGHT: 66 IN | HEART RATE: 89 BPM | OXYGEN SATURATION: 100 % | BODY MASS INDEX: 40.18 KG/M2 | RESPIRATION RATE: 20 BRPM | DIASTOLIC BLOOD PRESSURE: 105 MMHG | WEIGHT: 250 LBS | TEMPERATURE: 98 F

## 2022-08-28 DIAGNOSIS — R00.2 PALPITATIONS: ICD-10-CM

## 2022-08-28 DIAGNOSIS — R09.1 PLEURISY: ICD-10-CM

## 2022-08-28 DIAGNOSIS — U07.1 COVID-19: Primary | ICD-10-CM

## 2022-08-28 LAB
ALBUMIN SERPL-MCNC: 4.1 G/DL (ref 3.5–5.2)
ALBUMIN/GLOB SERPL: 1.3 G/DL
ALP SERPL-CCNC: 98 U/L (ref 39–117)
ALT SERPL W P-5'-P-CCNC: 10 U/L (ref 1–33)
ANION GAP SERPL CALCULATED.3IONS-SCNC: 9.9 MMOL/L (ref 5–15)
AST SERPL-CCNC: 12 U/L (ref 1–32)
BASOPHILS # BLD AUTO: 0.04 10*3/MM3 (ref 0–0.2)
BASOPHILS NFR BLD AUTO: 0.3 % (ref 0–1.5)
BILIRUB SERPL-MCNC: 0.3 MG/DL (ref 0–1.2)
BUN SERPL-MCNC: 13 MG/DL (ref 6–20)
BUN/CREAT SERPL: 16 (ref 7–25)
CALCIUM SPEC-SCNC: 9.5 MG/DL (ref 8.6–10.5)
CHLORIDE SERPL-SCNC: 101 MMOL/L (ref 98–107)
CO2 SERPL-SCNC: 28.1 MMOL/L (ref 22–29)
CREAT SERPL-MCNC: 0.81 MG/DL (ref 0.57–1)
D-LACTATE SERPL-SCNC: 0.7 MMOL/L (ref 0.5–2)
DEPRECATED RDW RBC AUTO: 40.7 FL (ref 37–54)
EGFRCR SERPLBLD CKD-EPI 2021: 100.3 ML/MIN/1.73
EOSINOPHIL # BLD AUTO: 0.35 10*3/MM3 (ref 0–0.4)
EOSINOPHIL NFR BLD AUTO: 2.7 % (ref 0.3–6.2)
ERYTHROCYTE [DISTWIDTH] IN BLOOD BY AUTOMATED COUNT: 13 % (ref 12.3–15.4)
GLOBULIN UR ELPH-MCNC: 3.1 GM/DL
GLUCOSE SERPL-MCNC: 107 MG/DL (ref 65–99)
HCT VFR BLD AUTO: 40.1 % (ref 34–46.6)
HGB BLD-MCNC: 13.2 G/DL (ref 12–15.9)
HOLD SPECIMEN: NORMAL
HOLD SPECIMEN: NORMAL
IMM GRANULOCYTES # BLD AUTO: 0.04 10*3/MM3 (ref 0–0.05)
IMM GRANULOCYTES NFR BLD AUTO: 0.3 % (ref 0–0.5)
LYMPHOCYTES # BLD AUTO: 2.73 10*3/MM3 (ref 0.7–3.1)
LYMPHOCYTES NFR BLD AUTO: 21.1 % (ref 19.6–45.3)
MCH RBC QN AUTO: 28.1 PG (ref 26.6–33)
MCHC RBC AUTO-ENTMCNC: 32.9 G/DL (ref 31.5–35.7)
MCV RBC AUTO: 85.5 FL (ref 79–97)
MONOCYTES # BLD AUTO: 0.72 10*3/MM3 (ref 0.1–0.9)
MONOCYTES NFR BLD AUTO: 5.6 % (ref 5–12)
NEUTROPHILS NFR BLD AUTO: 70 % (ref 42.7–76)
NEUTROPHILS NFR BLD AUTO: 9.04 10*3/MM3 (ref 1.7–7)
NRBC BLD AUTO-RTO: 0 /100 WBC (ref 0–0.2)
PLATELET # BLD AUTO: 247 10*3/MM3 (ref 140–450)
PMV BLD AUTO: 8.7 FL (ref 6–12)
POTASSIUM SERPL-SCNC: 4.1 MMOL/L (ref 3.5–5.2)
PROCALCITONIN SERPL-MCNC: 0.03 NG/ML (ref 0–0.25)
PROT SERPL-MCNC: 7.2 G/DL (ref 6–8.5)
RBC # BLD AUTO: 4.69 10*6/MM3 (ref 3.77–5.28)
SODIUM SERPL-SCNC: 139 MMOL/L (ref 136–145)
TROPONIN T SERPL-MCNC: <0.01 NG/ML (ref 0–0.03)
WBC NRBC COR # BLD: 12.92 10*3/MM3 (ref 3.4–10.8)
WHOLE BLOOD HOLD COAG: NORMAL
WHOLE BLOOD HOLD SPECIMEN: NORMAL

## 2022-08-28 PROCEDURE — 99283 EMERGENCY DEPT VISIT LOW MDM: CPT

## 2022-08-28 PROCEDURE — 71275 CT ANGIOGRAPHY CHEST: CPT

## 2022-08-28 PROCEDURE — 84145 PROCALCITONIN (PCT): CPT | Performed by: EMERGENCY MEDICINE

## 2022-08-28 PROCEDURE — 83605 ASSAY OF LACTIC ACID: CPT | Performed by: EMERGENCY MEDICINE

## 2022-08-28 PROCEDURE — 85025 COMPLETE CBC W/AUTO DIFF WBC: CPT | Performed by: EMERGENCY MEDICINE

## 2022-08-28 PROCEDURE — 71045 X-RAY EXAM CHEST 1 VIEW: CPT

## 2022-08-28 PROCEDURE — 84484 ASSAY OF TROPONIN QUANT: CPT | Performed by: EMERGENCY MEDICINE

## 2022-08-28 PROCEDURE — 80053 COMPREHEN METABOLIC PANEL: CPT | Performed by: EMERGENCY MEDICINE

## 2022-08-28 PROCEDURE — 96360 HYDRATION IV INFUSION INIT: CPT

## 2022-08-28 PROCEDURE — 25010000002 IOPAMIDOL 61 % SOLUTION: Performed by: EMERGENCY MEDICINE

## 2022-08-28 RX ORDER — SODIUM CHLORIDE 0.9 % (FLUSH) 0.9 %
10 SYRINGE (ML) INJECTION AS NEEDED
Status: DISCONTINUED | OUTPATIENT
Start: 2022-08-28 | End: 2022-08-29 | Stop reason: HOSPADM

## 2022-08-28 RX ORDER — IBUPROFEN 600 MG/1
600 TABLET ORAL EVERY 6 HOURS PRN
Qty: 30 TABLET | Refills: 0 | Status: SHIPPED | OUTPATIENT
Start: 2022-08-28 | End: 2023-01-31

## 2022-08-28 RX ORDER — ASPIRIN 325 MG
325 TABLET ORAL ONCE
Status: DISCONTINUED | OUTPATIENT
Start: 2022-08-28 | End: 2022-08-28

## 2022-08-28 RX ADMIN — IOPAMIDOL 100 ML: 612 INJECTION, SOLUTION INTRAVENOUS at 21:44

## 2022-08-28 RX ADMIN — SODIUM CHLORIDE 1000 ML: 9 INJECTION, SOLUTION INTRAVENOUS at 21:51

## 2022-09-06 ENCOUNTER — APPOINTMENT (OUTPATIENT)
Dept: GENERAL RADIOLOGY | Facility: HOSPITAL | Age: 32
End: 2022-09-06

## 2022-09-06 ENCOUNTER — HOSPITAL ENCOUNTER (EMERGENCY)
Facility: HOSPITAL | Age: 32
Discharge: HOME | End: 2022-09-06
Attending: EMERGENCY MEDICINE | Admitting: EMERGENCY MEDICINE

## 2022-09-06 VITALS
WEIGHT: 250 LBS | TEMPERATURE: 97.7 F | DIASTOLIC BLOOD PRESSURE: 82 MMHG | HEART RATE: 79 BPM | RESPIRATION RATE: 16 BRPM | SYSTOLIC BLOOD PRESSURE: 124 MMHG | BODY MASS INDEX: 40.18 KG/M2 | OXYGEN SATURATION: 100 % | HEIGHT: 66 IN

## 2022-09-06 DIAGNOSIS — R53.1 WEAKNESS: ICD-10-CM

## 2022-09-06 DIAGNOSIS — U07.1 COVID: Primary | ICD-10-CM

## 2022-09-06 DIAGNOSIS — E86.0 DEHYDRATION: ICD-10-CM

## 2022-09-06 DIAGNOSIS — R42 DIZZINESS: ICD-10-CM

## 2022-09-06 LAB
ALBUMIN SERPL-MCNC: 3.8 G/DL (ref 3.5–5.2)
ALBUMIN/GLOB SERPL: 1.3 G/DL
ALP SERPL-CCNC: 86 U/L (ref 39–117)
ALT SERPL W P-5'-P-CCNC: 9 U/L (ref 1–33)
ANION GAP SERPL CALCULATED.3IONS-SCNC: 3.4 MMOL/L (ref 5–15)
AST SERPL-CCNC: 11 U/L (ref 1–32)
BACTERIA UR QL AUTO: ABNORMAL /HPF
BASOPHILS # BLD AUTO: 0.05 10*3/MM3 (ref 0–0.2)
BASOPHILS NFR BLD AUTO: 0.3 % (ref 0–1.5)
BILIRUB SERPL-MCNC: 0.3 MG/DL (ref 0–1.2)
BILIRUB UR QL STRIP: NEGATIVE
BUN SERPL-MCNC: 20 MG/DL (ref 6–20)
BUN/CREAT SERPL: 27.8 (ref 7–25)
CALCIUM SPEC-SCNC: 8.9 MG/DL (ref 8.6–10.5)
CHLORIDE SERPL-SCNC: 104 MMOL/L (ref 98–107)
CLARITY UR: ABNORMAL
CO2 SERPL-SCNC: 32.6 MMOL/L (ref 22–29)
COLOR UR: YELLOW
CREAT SERPL-MCNC: 0.72 MG/DL (ref 0.57–1)
DEPRECATED RDW RBC AUTO: 40.6 FL (ref 37–54)
EGFRCR SERPLBLD CKD-EPI 2021: 115.5 ML/MIN/1.73
EOSINOPHIL # BLD AUTO: 0.1 10*3/MM3 (ref 0–0.4)
EOSINOPHIL NFR BLD AUTO: 0.7 % (ref 0.3–6.2)
ERYTHROCYTE [DISTWIDTH] IN BLOOD BY AUTOMATED COUNT: 13.1 % (ref 12.3–15.4)
GLOBULIN UR ELPH-MCNC: 3 GM/DL
GLUCOSE SERPL-MCNC: 96 MG/DL (ref 65–99)
GLUCOSE UR STRIP-MCNC: NEGATIVE MG/DL
HCT VFR BLD AUTO: 37.8 % (ref 34–46.6)
HGB BLD-MCNC: 12.4 G/DL (ref 12–15.9)
HGB UR QL STRIP.AUTO: ABNORMAL
HOLD SPECIMEN: NORMAL
HOLD SPECIMEN: NORMAL
HYALINE CASTS UR QL AUTO: ABNORMAL /LPF
IMM GRANULOCYTES # BLD AUTO: 0.1 10*3/MM3 (ref 0–0.05)
IMM GRANULOCYTES NFR BLD AUTO: 0.7 % (ref 0–0.5)
KETONES UR QL STRIP: NEGATIVE
LEUKOCYTE ESTERASE UR QL STRIP.AUTO: ABNORMAL
LYMPHOCYTES # BLD AUTO: 3.43 10*3/MM3 (ref 0.7–3.1)
LYMPHOCYTES NFR BLD AUTO: 23.6 % (ref 19.6–45.3)
MAGNESIUM SERPL-MCNC: 1.9 MG/DL (ref 1.6–2.6)
MCH RBC QN AUTO: 27.9 PG (ref 26.6–33)
MCHC RBC AUTO-ENTMCNC: 32.8 G/DL (ref 31.5–35.7)
MCV RBC AUTO: 85.1 FL (ref 79–97)
MONOCYTES # BLD AUTO: 0.91 10*3/MM3 (ref 0.1–0.9)
MONOCYTES NFR BLD AUTO: 6.3 % (ref 5–12)
NEUTROPHILS NFR BLD AUTO: 68.4 % (ref 42.7–76)
NEUTROPHILS NFR BLD AUTO: 9.93 10*3/MM3 (ref 1.7–7)
NITRITE UR QL STRIP: NEGATIVE
NRBC BLD AUTO-RTO: 0 /100 WBC (ref 0–0.2)
PH UR STRIP.AUTO: <=5 [PH] (ref 5–8)
PLATELET # BLD AUTO: 262 10*3/MM3 (ref 140–450)
PMV BLD AUTO: 9.3 FL (ref 6–12)
POTASSIUM SERPL-SCNC: 3.9 MMOL/L (ref 3.5–5.2)
PROT SERPL-MCNC: 6.8 G/DL (ref 6–8.5)
PROT UR QL STRIP: NEGATIVE
RBC # BLD AUTO: 4.44 10*6/MM3 (ref 3.77–5.28)
RBC # UR STRIP: ABNORMAL /HPF
REF LAB TEST METHOD: ABNORMAL
SODIUM SERPL-SCNC: 140 MMOL/L (ref 136–145)
SP GR UR STRIP: 1.02 (ref 1–1.03)
SQUAMOUS #/AREA URNS HPF: ABNORMAL /HPF
TROPONIN T SERPL-MCNC: <0.01 NG/ML (ref 0–0.03)
UROBILINOGEN UR QL STRIP: ABNORMAL
WBC # UR STRIP: ABNORMAL /HPF
WBC NRBC COR # BLD: 14.52 10*3/MM3 (ref 3.4–10.8)
WHOLE BLOOD HOLD COAG: NORMAL
WHOLE BLOOD HOLD SPECIMEN: NORMAL

## 2022-09-06 PROCEDURE — 81001 URINALYSIS AUTO W/SCOPE: CPT | Performed by: EMERGENCY MEDICINE

## 2022-09-06 PROCEDURE — 93005 ELECTROCARDIOGRAM TRACING: CPT | Performed by: EMERGENCY MEDICINE

## 2022-09-06 PROCEDURE — 99283 EMERGENCY DEPT VISIT LOW MDM: CPT

## 2022-09-06 PROCEDURE — 84484 ASSAY OF TROPONIN QUANT: CPT | Performed by: EMERGENCY MEDICINE

## 2022-09-06 PROCEDURE — 80053 COMPREHEN METABOLIC PANEL: CPT | Performed by: EMERGENCY MEDICINE

## 2022-09-06 PROCEDURE — 85025 COMPLETE CBC W/AUTO DIFF WBC: CPT | Performed by: EMERGENCY MEDICINE

## 2022-09-06 PROCEDURE — 87086 URINE CULTURE/COLONY COUNT: CPT | Performed by: PHYSICIAN ASSISTANT

## 2022-09-06 PROCEDURE — 83735 ASSAY OF MAGNESIUM: CPT | Performed by: EMERGENCY MEDICINE

## 2022-09-06 PROCEDURE — 71045 X-RAY EXAM CHEST 1 VIEW: CPT

## 2022-09-06 RX ORDER — SODIUM CHLORIDE 0.9 % (FLUSH) 0.9 %
10 SYRINGE (ML) INJECTION AS NEEDED
Status: DISCONTINUED | OUTPATIENT
Start: 2022-09-06 | End: 2022-09-06 | Stop reason: HOSPADM

## 2022-09-06 RX ADMIN — SODIUM CHLORIDE 1000 ML: 9 INJECTION, SOLUTION INTRAVENOUS at 09:01

## 2022-09-06 NOTE — ED PROVIDER NOTES
Subjective   History of Present Illness   Patient is a 31-year-old female with history of depression, migraines, rheumatoid arthritis presenting to the ER with complaints of generalized weakness and dizziness that started this morning.  Patient states that she was diagnosed with COVID on 8-.  She states that this is the first time she has had a positive COVID test.  She has been vaccinated and has a booster.  She states that she has had a rough COVID experience.  She states that she was supposed to go back to work today and woke up feeling extremely weak and dizzy so came to the ER for further evaluation.  She denies chest pain but states that her chest does feel tight.  Denies shortness of breath, nausea, vomiting, abdominal pain, and additional symptoms or complaints at this time.     Review of Systems   Respiratory: Positive for chest tightness.    Neurological: Positive for dizziness and weakness.   All other systems reviewed and are negative.      Past Medical History:   Diagnosis Date   • Depression    • Hx of migraine headaches    • Rheumatoid arthritis (HCC)        Allergies   Allergen Reactions   • Zithromax [Azithromycin] Anaphylaxis   • Mucinex Dm  [Dextromethorphan-Guaifenesin] Hives       Past Surgical History:   Procedure Laterality Date   • EYE SURGERY  2007    upper eyelid    • EYE SURGERY  2015    upper eyelid       Family History   Problem Relation Age of Onset   • Hypertension Mother    • Hypertension Father    • Cancer Maternal Grandmother    • Arthritis Paternal Grandmother    • Cancer Paternal Grandfather        Social History     Socioeconomic History   • Marital status: Single   Tobacco Use   • Smoking status: Never Smoker   • Smokeless tobacco: Never Used   Vaping Use   • Vaping Use: Never used   Substance and Sexual Activity   • Alcohol use: No   • Drug use: No   • Sexual activity: Defer           Objective   Physical Exam  Vitals and nursing note reviewed.   Constitutional:        General: She is not in acute distress.     Appearance: She is not toxic-appearing.   HENT:      Head: Normocephalic and atraumatic.      Right Ear: Tympanic membrane, ear canal and external ear normal.      Left Ear: Tympanic membrane, ear canal and external ear normal.      Nose: Nose normal.   Eyes:      Extraocular Movements: Extraocular movements intact.      Conjunctiva/sclera: Conjunctivae normal.      Pupils: Pupils are equal, round, and reactive to light.   Cardiovascular:      Rate and Rhythm: Normal rate.      Heart sounds: Normal heart sounds.   Pulmonary:      Effort: Pulmonary effort is normal.      Breath sounds: Normal breath sounds.   Abdominal:      General: There is no distension.      Palpations: Abdomen is soft.      Tenderness: There is no abdominal tenderness.   Musculoskeletal:         General: Normal range of motion.      Cervical back: Normal range of motion and neck supple.   Skin:     General: Skin is warm and dry.   Neurological:      General: No focal deficit present.      Mental Status: She is alert and oriented to person, place, and time.   Psychiatric:         Mood and Affect: Mood normal.         Behavior: Behavior normal.         Procedures           ED Course  ED Course as of 09/06/22 1031   Tue Sep 06, 2022   0846 EKG interpreted by me.  Sinus rhythm.  Rate of 71.  No ST segment or T wave changes.  Normal EKG [CG]      ED Course User Index  [CG] Jeff Gordon DO           Lab Results (last 24 hours)     Procedure Component Value Units Date/Time    Comprehensive Metabolic Panel [[id number]]  (Abnormal) Collected: 09/06/22 0802    Specimen: Blood Updated: 09/06/22 0828     Glucose 96 mg/dL      BUN 20 mg/dL      Creatinine 0.72 mg/dL      Sodium 140 mmol/L      Potassium 3.9 mmol/L      Chloride 104 mmol/L      CO2 32.6 mmol/L      Calcium 8.9 mg/dL      Total Protein 6.8 g/dL      Albumin 3.80 g/dL      ALT (SGPT) 9 U/L      AST (SGOT) 11 U/L      Alkaline Phosphatase 86 U/L       Total Bilirubin 0.3 mg/dL      Globulin 3.0 gm/dL      A/G Ratio 1.3 g/dL      BUN/Creatinine Ratio 27.8     Anion Gap 3.4 mmol/L      eGFR 115.5 mL/min/1.73      Comment: National Kidney Foundation and American Society of Nephrology (ASN) Task Force recommended calculation based on the Chronic Kidney Disease Epidemiology Collaboration (CKD-EPI) equation refit without adjustment for race.       Narrative:      GFR Normal >60  Chronic Kidney Disease <60  Kidney Failure <15      Troponin [[id number]]  (Normal) Collected: 09/06/22 0802    Specimen: Blood Updated: 09/06/22 0830     Troponin T <0.010 ng/mL     Narrative:      Troponin T Reference Range:  <= 0.03 ng/mL-   Negative for AMI  >0.03 ng/mL-     Abnormal for myocardial necrosis.  Clinicians would have to utilize clinical acumen, EKG, Troponin and serial changes to determine if it is an Acute Myocardial Infarction or myocardial injury due to an underlying chronic condition.       Results may be falsely decreased if patient taking Biotin.      Magnesium [[id number]]  (Normal) Collected: 09/06/22 0802    Specimen: Blood Updated: 09/06/22 0828     Magnesium 1.9 mg/dL     CBC & Differential [[id number]]  (Abnormal) Collected: 09/06/22 0803    Specimen: Blood Updated: 09/06/22 0812    Narrative:      The following orders were created for panel order CBC & Differential.  Procedure                               Abnormality         Status                     ---------                               -----------         ------                     CBC Auto Differential[[id number]]        Abnormal            Final result                 Please view results for these tests on the individual orders.    CBC Auto Differential [[id number]]  (Abnormal) Collected: 09/06/22 0803    Specimen: Blood Updated: 09/06/22 0812     WBC 14.52 10*3/mm3      RBC 4.44 10*6/mm3      Hemoglobin 12.4 g/dL      Hematocrit 37.8 %      MCV 85.1 fL      MCH 27.9 pg      MCHC 32.8 g/dL      RDW 13.1 %       RDW-SD 40.6 fl      MPV 9.3 fL      Platelets 262 10*3/mm3      Neutrophil % 68.4 %      Lymphocyte % 23.6 %      Monocyte % 6.3 %      Eosinophil % 0.7 %      Basophil % 0.3 %      Immature Grans % 0.7 %      Neutrophils, Absolute 9.93 10*3/mm3      Lymphocytes, Absolute 3.43 10*3/mm3      Monocytes, Absolute 0.91 10*3/mm3      Eosinophils, Absolute 0.10 10*3/mm3      Basophils, Absolute 0.05 10*3/mm3      Immature Grans, Absolute 0.10 10*3/mm3      nRBC 0.0 /100 WBC     Urinalysis With Microscopic If Indicated (No Culture) - Urine, Clean Catch [[id number]]  (Abnormal) Collected: 09/06/22 0904    Specimen: Urine, Clean Catch Updated: 09/06/22 0926     Color, UA Yellow     Appearance, UA Cloudy     pH, UA <=5.0     Specific Gravity, UA 1.023     Glucose, UA Negative     Ketones, UA Negative     Bilirubin, UA Negative     Blood, UA Large (3+)     Protein, UA Negative     Leuk Esterase, UA Trace     Nitrite, UA Negative     Urobilinogen, UA 0.2 E.U./dL    Urinalysis, Microscopic Only - Urine, Clean Catch [[id number]]  (Abnormal) Collected: 09/06/22 0904    Specimen: Urine, Clean Catch Updated: 09/06/22 1006     RBC, UA Too Numerous to Count /HPF      WBC, UA 3-5 /HPF      Bacteria, UA Trace /HPF      Squamous Epithelial Cells, UA 3-6 /HPF      Hyaline Casts, UA None Seen /LPF      Methodology Manual Light Microscopy    Urine Culture - Urine, Urine, Clean Catch [[id number]] Collected: 09/06/22 0904    Specimen: Urine, Clean Catch Updated: 09/06/22 1012             XR Chest 1 View    Result Date: 9/6/2022  CLINICAL INDICATION:  Weak/Dizzy/AMS triage protocol  EXAMINATION TECHNIQUE: XR CHEST 1 VW-  COMPARISON: Radiographs 09/01/2022. CT chest 08/28/2022.  FINDINGS: Lungs are clear without evidence of focal airspace consolidation. No pneumothorax. No pleural effusion. Heart and mediastinal contours are within normal limits. No acute osseous or soft tissue abnormalities. No free air under the hemidiaphragms.      Impression:  No acute cardiopulmonary findings.  Images personally reviewed, interpreted and dictated by MAYRA Vargas.       This report was signed and finalized on 9/6/2022 8:54 AM by MAYRA Vargas.                               MDM   Patient was evaluated in the ER for dizziness and weakness after testing positive for COVID on 8-.  Patient is mildly hypertensive but otherwise hemodynamically stable and nontoxic-appearing on exam.  Labs are nonactionable with leukocytosis and mildly elevated BUN to creatinine ratio.  Patient likely dehydrated.  Urinalysis is not convincing of a UTI though it did show trace bacteria and trace leukocytes.  Urine culture was sent for confirmation.  She was given 1 L IV fluids.  Patient states she was supposed to go back to work today after COVID but did not feel up to it.  She was given a work excuse.  She was advised to follow-up with her PCP for further outpatient evaluation if symptoms persist.  Precautions were given for return to the ER for any new or worsening symptoms.    Final diagnoses:   COVID   Weakness   Dizziness   Dehydration       ED Disposition  ED Disposition     ED Disposition   Discharge    Condition   Stable    Comment   --             Reuben Mercado, DO  [address] [place] [address] [phone]    Schedule an appointment as soon as possible for a visit   for further outpatient evaluation if symptoms persist    [facility] Emergency Department  [address] [place] [id number] [phone]  Go to   As needed, If symptoms worsen         Medication List      No changes were made to your prescriptions during this visit.          Brook Herrera PA-C  09/06/22 1036

## 2022-09-06 NOTE — DISCHARGE INSTRUCTIONS
Continue taking Zofran as needed as prescribed for nausea and vomiting.  Drink plenty of fluids at home to stay hydrated.  Follow-up with your PCP for further outpatient evaluation if symptoms persist.  Return to the ER for new or worsening symptoms or acute concerns.

## 2022-09-07 LAB — BACTERIA SPEC AEROBE CULT: NORMAL

## 2022-09-15 ENCOUNTER — OFFICE VISIT (OUTPATIENT)
Dept: FAMILY MEDICINE CLINIC | Facility: CLINIC | Age: 32
End: 2022-09-15

## 2022-09-15 VITALS
OXYGEN SATURATION: 97 % | RESPIRATION RATE: 16 BRPM | WEIGHT: 266.6 LBS | TEMPERATURE: 97.9 F | SYSTOLIC BLOOD PRESSURE: 148 MMHG | HEIGHT: 66 IN | HEART RATE: 91 BPM | BODY MASS INDEX: 42.85 KG/M2 | DIASTOLIC BLOOD PRESSURE: 112 MMHG

## 2022-09-15 DIAGNOSIS — R05.3 POST-COVID-19 SYNDROME MANIFESTING AS CHRONIC COUGH: Primary | ICD-10-CM

## 2022-09-15 DIAGNOSIS — R09.1 PLEURISY: ICD-10-CM

## 2022-09-15 DIAGNOSIS — M05.79 RHEUMATOID ARTHRITIS INVOLVING MULTIPLE SITES WITH POSITIVE RHEUMATOID FACTOR: ICD-10-CM

## 2022-09-15 DIAGNOSIS — F41.9 ANXIETY: ICD-10-CM

## 2022-09-15 DIAGNOSIS — R30.0 DYSURIA: ICD-10-CM

## 2022-09-15 DIAGNOSIS — R06.02 SHORTNESS OF BREATH: ICD-10-CM

## 2022-09-15 DIAGNOSIS — U09.9 POST-COVID-19 SYNDROME MANIFESTING AS CHRONIC COUGH: Primary | ICD-10-CM

## 2022-09-15 LAB
BILIRUB BLD-MCNC: NEGATIVE MG/DL
CLARITY, POC: CLEAR
COLOR UR: YELLOW
EXPIRATION DATE: ABNORMAL
GLUCOSE UR STRIP-MCNC: NEGATIVE MG/DL
KETONES UR QL: NEGATIVE
LEUKOCYTE EST, POC: ABNORMAL
Lab: ABNORMAL
NITRITE UR-MCNC: NEGATIVE MG/ML
PH UR: 5.5 [PH] (ref 5–8)
PROT UR STRIP-MCNC: NEGATIVE MG/DL
RBC # UR STRIP: NEGATIVE /UL
SP GR UR: 1.03 (ref 1–1.03)
UROBILINOGEN UR QL: NORMAL

## 2022-09-15 PROCEDURE — 81003 URINALYSIS AUTO W/O SCOPE: CPT | Performed by: NURSE PRACTITIONER

## 2022-09-15 PROCEDURE — 99213 OFFICE O/P EST LOW 20 MIN: CPT | Performed by: NURSE PRACTITIONER

## 2022-09-15 RX ORDER — HYDROXYZINE HYDROCHLORIDE 10 MG/1
10 TABLET, FILM COATED ORAL 3 TIMES DAILY PRN
Qty: 10 TABLET | Refills: 0 | Status: SHIPPED | OUTPATIENT
Start: 2022-09-15 | End: 2023-01-31

## 2022-09-15 RX ORDER — HYDROXYCHLOROQUINE SULFATE 200 MG/1
400 TABLET, FILM COATED ORAL DAILY
COMMUNITY
End: 2023-02-27 | Stop reason: SDUPTHER

## 2022-09-15 NOTE — PROGRESS NOTES
Established Patient        Chief Complaint:   Chief Complaint   Patient presents with   • Cough   • Fever     ER FOLLOW UP          History of Present Illness:    Becky Garcia is a 31 y.o. female who presents today for hospital follow-up. Patient states that she was diagnosed with COVID about 3 weeks ago which then turned into Pneumonia and pleurisy. Patient has been dehydrated, fatigued, weak. Reports that her chest still feels tight and still has non-productive cough when she lays down at night. Patient also reports dysuria that started this morning. Denies frequency, urgency.     Subjective     The following portions of the patient's history were reviewed and updated as appropriate: allergies, current medications, past family history, past medical history, past social history, past surgical history and problem list.    ALLERGIES  Allergies   Allergen Reactions   • Zithromax [Azithromycin] Anaphylaxis   • Mucinex Dm  [Dextromethorphan-Guaifenesin] Hives       ROS  Review of Systems  1. Constitutional: fatigue  2. HENT: No dysphagia; no changes to vision/hearing/smell/taste; no epistaxis  3. Eyes: Negative for redness and visual disturbance.   4. Respiratory: chest tightness, non-productive cough, SOA with exertion  5. Cardiovascular: Negative for chest pain and palpitations.   6. Gastrointestinal: Negative for abdominal distention, abdominal pain and blood in stool.   7. Endocrine: Negative for cold intolerance and heat intolerance.   8. Genitourinary: dysuria  9. Musculoskeletal: generalized weakness  10. Skin: Negative for color change, rash and wound.   11. Neurological: Negative for syncope, weakness and headaches.   12. Hematological: Negative for adenopathy. Does not bruise/bleed easily.   13. Psychiatric/Behavioral: Negative for confusion. The patient is not nervous/anxious.    Objective     Vital Signs:   BP (!) 148/112   Pulse 91   Temp 97.9 °F (36.6 °C)   Resp 16   Ht  "167.6 cm (66\")   Wt 121 kg (266 lb 9.6 oz)   LMP 09/06/2022 (Exact Date)   SpO2 97%   BMI 43.03 kg/m²     Physical Exam   Physical Exam  General Appearance: alert, oriented x 3, no acute distress.  Skin: warm and dry.   HEENT: Atraumatic.  pupils round and reactive to light and accommodation, oral mucosa pink and moist.  Nares patent without epistaxis.  External auditory canals are patent tympanic membranes intact.  Neck: supple, no JVD, trachea midline.  No thyromegaly  Lungs: CTA, unlabored breathing effort.  Heart: RRR, normal S1 and S2, no S3, no rub.  Abdomen: soft, non-tender, no palpable bladder, present bowel sounds to auscultation ×4.  No guarding or rigidity.  Extremities: no clubbing, cyanosis or edema.  Good range of motion actively and passively.  Symmetric muscle strength and development  Neuro: normal speech and mental status.  Cranial nerves II through XII intact.  No anosmia. DTR 2+; proprioception intact.  No focal motor/sensory deficits.    Assessment and Plan      Assessment/Plan:   Diagnoses and all orders for this visit:    1. 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 Dysuria  -     POCT urinalysis dipstick, automated    3. Shortness of breath  -     CBC w AUTO Differential; Future  -     Comprehensive metabolic panel; Future    4. Anxiety  -     hydrOXYzine (ATARAX) 10 MG tablet; Take 1 tablet by mouth 3 (Three) Times a Day As Needed for Anxiety.  Dispense: 10 tablet; Refill: 0    5. Pleurisy    6. Rheumatoid arthritis involving multiple sites with positive rheumatoid factor (HCC)        Discussion Summary:  Discussed plan of care in detail with pt today; pt verb understanding and agrees.    Follow up:  Return if symptoms worsen or fail to improve.     Patient Education:  There are no Patient Instructions on file for this visit.    BEATRIZ Schuler  09/22/22  16:53 " EDT          Please note that portions of this note may have been completed with a voice recognition program. Efforts were made to edit the dictations, but occasionally words are mistranscribed.

## 2022-11-28 RX ORDER — DULOXETIN HYDROCHLORIDE 60 MG/1
CAPSULE, DELAYED RELEASE ORAL
Qty: 90 CAPSULE | Refills: 0 | Status: SHIPPED | OUTPATIENT
Start: 2022-11-28

## 2022-12-11 DIAGNOSIS — G56.41 COMPLEX REGIONAL PAIN SYNDROME TYPE 2 OF RIGHT UPPER EXTREMITY: ICD-10-CM

## 2022-12-11 DIAGNOSIS — M79.18 CERVICAL MYOFASCIAL PAIN SYNDROME: Chronic | ICD-10-CM

## 2022-12-12 RX ORDER — GABAPENTIN 400 MG/1
CAPSULE ORAL
Qty: 60 CAPSULE | OUTPATIENT
Start: 2022-12-12

## 2022-12-14 DIAGNOSIS — M79.18 CERVICAL MYOFASCIAL PAIN SYNDROME: Chronic | ICD-10-CM

## 2022-12-14 DIAGNOSIS — G56.41 COMPLEX REGIONAL PAIN SYNDROME TYPE 2 OF RIGHT UPPER EXTREMITY: ICD-10-CM

## 2022-12-15 RX ORDER — GABAPENTIN 400 MG/1
400 CAPSULE ORAL NIGHTLY
Qty: 60 CAPSULE | Refills: 2 | Status: SHIPPED | OUTPATIENT
Start: 2022-12-15 | End: 2023-01-31 | Stop reason: DRUGHIGH

## 2022-12-15 NOTE — TELEPHONE ENCOUNTER
Rx Refill Note  Requested Prescriptions     Pending Prescriptions Disp Refills   • gabapentin (NEURONTIN) 400 MG capsule 60 capsule 2     Sig: Take 1 capsule by mouth Every Night.      Last office visit with prescribing clinician: Visit date not found   Last telemedicine visit with prescribing clinician: Visit date not found   Next office visit with prescribing clinician: Visit date not found                         Would you like a call back once the refill request has been completed: [] Yes [] No    If the office needs to give you a call back, can they leave a voicemail: [] Yes [] No    Adele Alexandre MA  12/15/22, 07:51 EST

## 2023-01-31 ENCOUNTER — OFFICE VISIT (OUTPATIENT)
Dept: FAMILY MEDICINE CLINIC | Facility: CLINIC | Age: 33
End: 2023-01-31
Payer: MEDICAID

## 2023-01-31 VITALS
BODY MASS INDEX: 43.23 KG/M2 | HEIGHT: 66 IN | HEART RATE: 85 BPM | WEIGHT: 269 LBS | DIASTOLIC BLOOD PRESSURE: 80 MMHG | TEMPERATURE: 98 F | SYSTOLIC BLOOD PRESSURE: 130 MMHG | OXYGEN SATURATION: 99 %

## 2023-01-31 DIAGNOSIS — G56.41 COMPLEX REGIONAL PAIN SYNDROME TYPE 2 OF RIGHT UPPER EXTREMITY: Primary | ICD-10-CM

## 2023-01-31 DIAGNOSIS — M79.18 CERVICAL MYOFASCIAL PAIN SYNDROME: Chronic | ICD-10-CM

## 2023-01-31 DIAGNOSIS — M05.79 RHEUMATOID ARTHRITIS INVOLVING MULTIPLE SITES WITH POSITIVE RHEUMATOID FACTOR: ICD-10-CM

## 2023-01-31 PROCEDURE — 99214 OFFICE O/P EST MOD 30 MIN: CPT | Performed by: FAMILY MEDICINE

## 2023-01-31 RX ORDER — GABAPENTIN 400 MG/1
400 CAPSULE ORAL 2 TIMES DAILY
Qty: 60 CAPSULE | Refills: 2 | Status: SHIPPED | OUTPATIENT
Start: 2023-01-31

## 2023-01-31 NOTE — PROGRESS NOTES
Established Patient        Chief Complaint: CRPS/mood disorder.       Becky Garcia is a 32 y.o. female    History of Present Illness:   Here today in scheduled follow-up visit of her CRPS right upper extremity, cervical myofascial pain syndrome and rheumatoid arthritis.    Patient reports decreased effectiveness of current dosing gabapentin.  She would like to see how she does with twice daily dosing.  She also needs a referral to a new rheumatologist, as her previous has retired and moved out of the area.    Most ADLs continues to be bothered by her CRPS, as well as cervical myofascial pain syndrome.  Denies any new falls or injuries.  Denies fever, chills or night sweats.  Denies any new onset rashes.  Maintains adequate nutritional intake, appropriate hydration status.    Subjective     The following portions of the patient's history were reviewed and updated as appropriate: allergies, current medications, past family history, past medical history, past social history, past surgical history and problem list.    Allergies   Allergen Reactions   • Zithromax [Azithromycin] Anaphylaxis   • Mucinex Dm  [Dextromethorphan-Guaifenesin] Hives       Review of Systems  Constitutional: Negative for fever. Negative for chills, diaphoresis, fatigue and unexpected weight change.   HENT: No dysphagia; no changes to vision/hearing/smell/taste; no epistaxis  Eyes: Negative for redness and visual disturbance.   Respiratory: negative for shortness of breath. Negative for chest pain . Negative for cough and chest tightness.   Cardiovascular: Negative for chest pain and palpitations.   Gastrointestinal: Negative for abdominal distention, abdominal pain and blood in stool.   Endocrine: Negative for cold intolerance and heat intolerance.   Genitourinary: Negative for difficulty urinating, dysuria and frequency.   Musculoskeletal: Chronic arthralgias and myalgias.   Skin: Negative for color change, rash and  "wound.   Neurological: Negative for syncope, weakness and headaches.   Hematological: Negative for adenopathy. Does not bruise/bleed easily.   Psychiatric/Behavioral: Negative for confusion. The patient is not nervous/anxious.    Objective     Physical Exam   Vital Signs: /80   Pulse 85   Temp 98 °F (36.7 °C)   Ht 167.6 cm (66\")   Wt 122 kg (269 lb)   SpO2 99%   BMI 43.42 kg/m²     General Appearance: alert, oriented x 3, no acute distress.  Skin: warm and dry.   HEENT: Atraumatic.  pupils round and reactive to light and accommodation, oral mucosa pink and moist.  Nares patent without epistaxis.  External auditory canals are patent tympanic membranes intact.  Neck: supple, no JVD, trachea midline.  No thyromegaly  Lungs: CTA, unlabored breathing effort.  Heart: RRR, normal S1 and S2, no S3, no rub.  Abdomen: soft, non-tender, no palpable bladder, present bowel sounds to auscultation ×4.  No guarding or rigidity.  Extremities: no clubbing, cyanosis or edema.  Good range of motion actively and passively.  Symmetric muscle strength and development  Neuro: normal speech and mental status.  Cranial nerves II through XII intact.  No anosmia. DTR 2+; proprioception intact.  No focal motor/sensory deficits.    Advance Care Planning   ACP discussion was held with the patient during this visit. Patient does not have an advance directive, information provided.       Assessment and Plan      Assessment/Plan:   Diagnoses and all orders for this visit:    1. Complex regional pain syndrome type 2 of right upper extremity (Primary)  -     Ambulatory Referral to Rheumatology  -     gabapentin (NEURONTIN) 400 MG capsule; Take 1 capsule by mouth 2 (Two) Times a Day.  Dispense: 60 capsule; Refill: 2    2. Cervical myofascial pain syndrome  -     gabapentin (NEURONTIN) 400 MG capsule; Take 1 capsule by mouth 2 (Two) Times a Day.  Dispense: 60 capsule; Refill: 2    3. Rheumatoid arthritis involving multiple sites with " positive rheumatoid factor (HCC)  -     Ambulatory Referral to Rheumatology      Planned increase in gabapentin to twice daily dosing.  I have asked that she notify the office should she develop any ill effects with the dose adjustment.    Referral has been placed to rheumatology.    Vital signs demonstrate hemodynamic stability, blood pressure is at goal.    Discussed need for stress/anxiety reducing techniques such as prayer/meditation/breathing and counting exercises and avoidance of stress producing environments/situations; will follow clinically.    Discussed need for reduction in sodium/salt/caffeine intake; improve sleep habits as able; inc formal CV exercise program with goal of vigorous activity most, if not all, days of the week; goal of 250 min of sustained HR CV exercise total per week.    Discussion Summary:    Discussed plan of care in detail with pt today; pt verb understanding and agrees.    I spent 30 minutes caring for Becky on this date of service. This time includes time spent by me in the following activities:preparing for the visit, performing a medically appropriate examination and/or evaluation , counseling and educating the patient/family/caregiver, ordering medications, tests, or procedures, referring and communicating with other health care professionals , documenting information in the medical record and care coordination    I have reviewed and updated all copied forward information, as appropriate.  I attest to the accuracy and relevance of any unchanged information.    Follow up:  No follow-ups on file.     There are no Patient Instructions on file for this visit.    Reuben Mercado DO  01/31/23  15:06 EST

## 2023-02-27 ENCOUNTER — OFFICE VISIT (OUTPATIENT)
Dept: FAMILY MEDICINE CLINIC | Facility: CLINIC | Age: 33
End: 2023-02-27
Payer: MEDICAID

## 2023-02-27 VITALS
DIASTOLIC BLOOD PRESSURE: 76 MMHG | HEIGHT: 66 IN | WEIGHT: 266 LBS | OXYGEN SATURATION: 99 % | TEMPERATURE: 97 F | RESPIRATION RATE: 12 BRPM | BODY MASS INDEX: 42.75 KG/M2 | SYSTOLIC BLOOD PRESSURE: 122 MMHG | HEART RATE: 62 BPM

## 2023-02-27 DIAGNOSIS — M06.09 RHEUMATOID ARTHRITIS OF MULTIPLE SITES WITH NEGATIVE RHEUMATOID FACTOR: Primary | ICD-10-CM

## 2023-02-27 PROCEDURE — 99213 OFFICE O/P EST LOW 20 MIN: CPT | Performed by: FAMILY MEDICINE

## 2023-02-27 RX ORDER — HYDROXYCHLOROQUINE SULFATE 200 MG/1
400 TABLET, FILM COATED ORAL DAILY
Qty: 180 TABLET | Refills: 1 | Status: SHIPPED | OUTPATIENT
Start: 2023-02-27 | End: 2023-08-26

## 2023-02-27 NOTE — PROGRESS NOTES
Established Patient        Chief Complaint: CRPS/mood disorder.       Becky Garcia is a 32 y.o. female    History of Present Illness:   Here today in scheduled follow-up visit of her CRPS/rheumatoid arthritis.    She denies any problems with her current medication regimen.  She continues to realize therapeutic benefit of increased dosing of gabapentin at previous visit.  Denies fever, chills or night sweats.  Denies chest pain, syncope, palpitations or vertigo.  She has utilized hydroxychloroquine with profound improvement to her clinical symptoms.  She needs a refill on medications today, as she is currently in between establishing with rheumatologist.    Subjective     The following portions of the patient's history were reviewed and updated as appropriate: allergies, current medications, past family history, past medical history, past social history, past surgical history and problem list.    Allergies   Allergen Reactions   • Zithromax [Azithromycin] Anaphylaxis   • Mucinex Dm  [Dextromethorphan-Guaifenesin] Hives       Review of Systems  1. Constitutional: Negative for fever. Negative for chills, diaphoresis, fatigue and unexpected weight change.   2. HENT: No dysphagia; no changes to vision/hearing/smell/taste; no epistaxis  3. Eyes: Negative for redness and visual disturbance.   4. Respiratory: negative for shortness of breath. Negative for chest pain . Negative for cough and chest tightness.   5. Cardiovascular: Negative for chest pain and palpitations.   6. Gastrointestinal: Negative for abdominal distention, abdominal pain and blood in stool.   7. Endocrine: Negative for cold intolerance and heat intolerance.   8. Genitourinary: Negative for difficulty urinating, dysuria and frequency.   9. Musculoskeletal: Chronic arthralgias and myalgias.   10. Skin: Negative for color change, rash and wound.   11. Neurological: Negative for syncope, weakness and headaches.  "  12. Hematological: Negative for adenopathy. Does not bruise/bleed easily.   13. Psychiatric/Behavioral: Negative for confusion. The patient is not nervous/anxious.    Objective     Physical Exam   Vital Signs: /94   Pulse 62   Temp 97 °F (36.1 °C)   Resp 12   Ht 167.6 cm (66\")   Wt 121 kg (266 lb)   SpO2 99%   BMI 42.93 kg/m²     General Appearance: alert, oriented x 3, no acute distress.  Skin: warm and dry.   HEENT: Atraumatic.  pupils round and reactive to light and accommodation, oral mucosa pink and moist.  Nares patent without epistaxis.  External auditory canals are patent tympanic membranes intact.  Neck: supple, no JVD, trachea midline.  No thyromegaly  Lungs: CTA, unlabored breathing effort.  Heart: RRR, normal S1 and S2, no S3, no rub.  Abdomen: soft, non-tender, no palpable bladder, present bowel sounds to auscultation ×4.  No guarding or rigidity.  Extremities: no clubbing, cyanosis or edema.  Good range of motion actively and passively.  Symmetric muscle strength and development  Neuro: normal speech and mental status.  Cranial nerves II through XII intact.  No anosmia. DTR 2+; proprioception intact.  No focal motor/sensory deficits.        Assessment and Plan      Assessment/Plan:   Diagnoses and all orders for this visit:    1. Rheumatoid arthritis of multiple sites with negative rheumatoid factor (HCC) (Primary)  -     hydroxychloroquine (PLAQUENIL) 200 MG tablet; Take 2 tablets by mouth Daily for 180 days. Indications: Rheumatoid Arthritis  Dispense: 180 tablet; Refill: 1      Keep appt with rheumatology as scheduled.  I have refilled patient's hydroxychloroquine today.    Last eye exam earlier this month.    Will need surveillance labs at next visit if not already completed.    Vital signs demonstrate hemodynamic stability.    Discussion Summary:    Discussed plan of care in detail with pt today; pt verb understanding and agrees.    I spent 25 minutes caring for Becky on this date " of service. This time includes time spent by me in the following activities:preparing for the visit, performing a medically appropriate examination and/or evaluation , counseling and educating the patient/family/caregiver, ordering medications, tests, or procedures, documenting information in the medical record and care coordination    I have reviewed and updated all copied forward information, as appropriate.  I attest to the accuracy and relevance of any unchanged information.    Follow up:  Return for Next scheduled follow up.     There are no Patient Instructions on file for this visit.    Reuben Mercado DO  02/27/23  12:21 EST

## 2023-05-01 ENCOUNTER — OFFICE VISIT (OUTPATIENT)
Dept: FAMILY MEDICINE CLINIC | Facility: CLINIC | Age: 33
End: 2023-05-01
Payer: MEDICAID

## 2023-05-01 VITALS
TEMPERATURE: 98.3 F | SYSTOLIC BLOOD PRESSURE: 122 MMHG | DIASTOLIC BLOOD PRESSURE: 82 MMHG | WEIGHT: 265 LBS | BODY MASS INDEX: 42.59 KG/M2 | HEART RATE: 88 BPM | RESPIRATION RATE: 16 BRPM | OXYGEN SATURATION: 98 % | HEIGHT: 66 IN

## 2023-05-01 DIAGNOSIS — M79.18 CERVICAL MYOFASCIAL PAIN SYNDROME: Chronic | ICD-10-CM

## 2023-05-01 DIAGNOSIS — G56.41 COMPLEX REGIONAL PAIN SYNDROME TYPE 2 OF RIGHT UPPER EXTREMITY: ICD-10-CM

## 2023-05-01 DIAGNOSIS — M79.10 MYALGIA: ICD-10-CM

## 2023-05-01 DIAGNOSIS — M25.50 POLYARTHRALGIA: ICD-10-CM

## 2023-05-01 DIAGNOSIS — M05.79 RHEUMATOID ARTHRITIS INVOLVING MULTIPLE SITES WITH POSITIVE RHEUMATOID FACTOR: Primary | ICD-10-CM

## 2023-05-01 RX ORDER — GABAPENTIN 600 MG/1
600 TABLET ORAL 2 TIMES DAILY
Qty: 60 TABLET | Refills: 3 | Status: SHIPPED | OUTPATIENT
Start: 2023-05-01

## 2023-05-01 RX ORDER — IBUPROFEN 200 MG
200 TABLET ORAL
COMMUNITY

## 2023-05-01 RX ORDER — SULFASALAZINE 500 MG/1
TABLET ORAL
COMMUNITY
Start: 2023-03-21

## 2023-05-01 RX ORDER — SULFASALAZINE 500 MG/1
500 TABLET ORAL
COMMUNITY
Start: 2023-03-20 | End: 2024-03-19

## 2023-05-01 RX ORDER — ETANERCEPT 50 MG/ML
50 SOLUTION SUBCUTANEOUS
COMMUNITY
Start: 2023-04-19

## 2023-05-01 RX ORDER — SENNOSIDES 8.6 MG
650 CAPSULE ORAL
COMMUNITY

## 2023-05-01 RX ORDER — ETANERCEPT 50 MG/ML
SOLUTION SUBCUTANEOUS
COMMUNITY
Start: 2023-04-24

## 2023-05-01 RX ORDER — CHLORHEXIDINE GLUCONATE 4 %
LIQUID (ML) TOPICAL
COMMUNITY

## 2023-05-01 NOTE — PROGRESS NOTES
Established Patient        Chief Complaint: CRPS/mood disorder.       Becky Garcia is a 32 y.o. female    History of Present Illness:   Here today in scheduled follow-up visit of her CRPS, polyarthralgia, myalgia, cervical myofascial pain syndrome and rheumatoid arthritis involving multiple sites.    She denies fever, chills or night sweats.  Continues to be bothered by the myalgias and polyarthralgias, although has responded well thus far to her current medication regimen.  Being followed by rheumatology.  She denies chest pain, syncope, palpitations or vertigo.  She does continue to demonstrate some adjustment disorder effects, most notably as she has a strong desire and is very well motivated for return to normal physical activity and work-related abilities.    Subjective     The following portions of the patient's history were reviewed and updated as appropriate: allergies, current medications, past family history, past medical history, past social history, past surgical history and problem list.    Allergies   Allergen Reactions   • Zithromax [Azithromycin] Anaphylaxis   • Mucinex Dm  [Dextromethorphan-Guaifenesin] Hives       Review of Systems  1. Constitutional: Negative for fever. Negative for chills, diaphoresis, fatigue and unexpected weight change.   2. HENT: No dysphagia; no changes to vision/hearing/smell/taste; no epistaxis  3. Eyes: Negative for redness and visual disturbance.   4. Respiratory: negative for shortness of breath. Negative for chest pain . Negative for cough and chest tightness.   5. Cardiovascular: Negative for chest pain and palpitations.   6. Gastrointestinal: Negative for abdominal distention, abdominal pain and blood in stool.   7. Endocrine: Negative for cold intolerance and heat intolerance.   8. Genitourinary: Negative for difficulty urinating, dysuria and frequency.   9. Musculoskeletal: Chronic arthralgias and myalgias.   10. Skin: Negative for  "color change, rash and wound.   11. Neurological: Negative for syncope, weakness and headaches.   12. Hematological: Negative for adenopathy. Does not bruise/bleed easily.   13. Psychiatric/Behavioral: Negative for confusion. The patient is not nervous/anxious.    Objective     Physical Exam   Vital Signs: /82   Pulse 88   Temp 98.3 °F (36.8 °C)   Resp 16   Ht 167.6 cm (66\")   Wt 120 kg (265 lb)   SpO2 98%   BMI 42.77 kg/m²     General Appearance: alert, oriented x 3, no acute distress.  Skin: warm and dry.   HEENT: Atraumatic.  pupils round and reactive to light and accommodation, oral mucosa pink and moist.  Nares patent without epistaxis.  External auditory canals are patent tympanic membranes intact.  Neck: supple, no JVD, trachea midline.  No thyromegaly  Lungs: CTA, unlabored breathing effort.  Heart: RRR, normal S1 and S2, no S3, no rub.  Abdomen: soft, non-tender, no palpable bladder, present bowel sounds to auscultation ×4.  No guarding or rigidity.  Extremities: no clubbing, cyanosis or edema.  Good range of motion actively and passively.  Symmetric muscle strength and development  Neuro: normal speech and mental status.  Cranial nerves II through XII intact.  No anosmia. DTR 2+; proprioception intact.  No focal motor/sensory deficits.        Assessment and Plan      Assessment/Plan:   Diagnoses and all orders for this visit:    1. Rheumatoid arthritis involving multiple sites with positive rheumatoid factor (Primary)  -     gabapentin (NEURONTIN) 600 MG tablet; Take 1 tablet by mouth 2 (Two) Times a Day.  Dispense: 60 tablet; Refill: 3    2. Complex regional pain syndrome type 2 of right upper extremity  -     gabapentin (NEURONTIN) 600 MG tablet; Take 1 tablet by mouth 2 (Two) Times a Day.  Dispense: 60 tablet; Refill: 3    3. Polyarthralgia    4. Myalgia    5. Cervical myofascial pain syndrome      Dose adjustment made today for gabapentin; will follow clinically.    Keep scheduled f/u appt " with Dr. Dave, rheumatology.    Continue Enbrel.    Vital signs demonstrate hemodynamic stability.      Discussion Summary:    Discussed plan of care in detail with pt today; pt verb understanding and agrees.    I spent 30 minutes caring for Becky on this date of service. This time includes time spent by me in the following activities:preparing for the visit, performing a medically appropriate examination and/or evaluation , counseling and educating the patient/family/caregiver, ordering medications, tests, or procedures, documenting information in the medical record and care coordination    I have reviewed and updated all copied forward information, as appropriate.  I attest to the accuracy and relevance of any unchanged information.    Follow up:  Return in about 4 months (around 9/1/2023) for Recheck, Med Change/New Meds.     There are no Patient Instructions on file for this visit.    Reuben Mercado DO  05/22/23  13:33 EDT

## 2023-07-24 DIAGNOSIS — M79.18 CERVICAL MYOFASCIAL PAIN SYNDROME: Chronic | ICD-10-CM

## 2023-07-24 DIAGNOSIS — G56.41 COMPLEX REGIONAL PAIN SYNDROME TYPE 2 OF RIGHT UPPER EXTREMITY: ICD-10-CM

## 2023-07-24 RX ORDER — AMITRIPTYLINE HYDROCHLORIDE 10 MG/1
TABLET, FILM COATED ORAL
Qty: 90 TABLET | Refills: 0 | Status: SHIPPED | OUTPATIENT
Start: 2023-07-24

## 2023-09-12 DIAGNOSIS — G56.41 COMPLEX REGIONAL PAIN SYNDROME TYPE 2 OF RIGHT UPPER EXTREMITY: ICD-10-CM

## 2023-09-12 DIAGNOSIS — M05.79 RHEUMATOID ARTHRITIS INVOLVING MULTIPLE SITES WITH POSITIVE RHEUMATOID FACTOR: ICD-10-CM

## 2023-09-12 RX ORDER — GABAPENTIN 600 MG/1
TABLET ORAL
Qty: 60 TABLET | Refills: 3 | Status: SHIPPED | OUTPATIENT
Start: 2023-09-12

## 2023-09-12 NOTE — TELEPHONE ENCOUNTER
Rx Refill Note  Requested Prescriptions     Pending Prescriptions Disp Refills    gabapentin (NEURONTIN) 600 MG tablet [Pharmacy Med Name: GABAPENTIN 600 MG TABLET] 60 tablet      Sig: TAKE ONE TABLET BY MOUTH TWICE A DAY      Last office visit with prescribing clinician: 5/1/2023   Last telemedicine visit with prescribing clinician: Visit date not found   Next office visit with prescribing clinician: 9/19/2023                         Would you like a call back once the refill request has been completed: [] Yes [] No    If the office needs to give you a call back, can they leave a voicemail: [] Yes [] No    Georgina Xavier MA  09/12/23, 10:38 EDT

## 2023-09-19 ENCOUNTER — OFFICE VISIT (OUTPATIENT)
Dept: FAMILY MEDICINE CLINIC | Facility: CLINIC | Age: 33
End: 2023-09-19
Payer: MEDICAID

## 2023-09-19 VITALS
RESPIRATION RATE: 18 BRPM | SYSTOLIC BLOOD PRESSURE: 116 MMHG | BODY MASS INDEX: 41.14 KG/M2 | HEART RATE: 107 BPM | WEIGHT: 256 LBS | TEMPERATURE: 97 F | HEIGHT: 66 IN | OXYGEN SATURATION: 100 % | DIASTOLIC BLOOD PRESSURE: 78 MMHG

## 2023-09-19 DIAGNOSIS — K21.9 GERD WITHOUT ESOPHAGITIS: ICD-10-CM

## 2023-09-19 DIAGNOSIS — F39 MOOD DISORDER: ICD-10-CM

## 2023-09-19 DIAGNOSIS — M79.18 CERVICAL MYOFASCIAL PAIN SYNDROME: Chronic | ICD-10-CM

## 2023-09-19 DIAGNOSIS — M06.9 RHEUMATOID ARTHRITIS INVOLVING MULTIPLE SITES, UNSPECIFIED WHETHER RHEUMATOID FACTOR PRESENT: Primary | ICD-10-CM

## 2023-09-19 DIAGNOSIS — G56.41 COMPLEX REGIONAL PAIN SYNDROME TYPE 2 OF RIGHT UPPER EXTREMITY: ICD-10-CM

## 2023-09-19 PROCEDURE — 99214 OFFICE O/P EST MOD 30 MIN: CPT | Performed by: FAMILY MEDICINE

## 2023-09-19 RX ORDER — PANTOPRAZOLE SODIUM 20 MG/1
20 TABLET, DELAYED RELEASE ORAL DAILY
Qty: 90 TABLET | Refills: 1 | Status: SHIPPED | OUTPATIENT
Start: 2023-09-19

## 2023-09-19 NOTE — PROGRESS NOTES
Established Patient        Chief Complaint: CRPS/mood disorder.       Becky Garcia is a 32 y.o. female    History of Present Illness:   Here today in scheduled follow-up visit of her rheumatoid arthritis, complex regional pain syndrome of the right upper extremity, cervical myofascial pain syndrome, mood disorder.    Patient reports noticeable improvement to all aspects of her wellbeing with current treatment regimen.  She has continued to utilize ibuprofen periodically, now only taking a total of 800 mg daily.  Continues to be followed by rheumatology.  Mood has been well controlled with current treatment regimen.  She has a planned trip to [facility] with her family in the next few weeks.    Patient does report periodic nausea, does seem to be associated with ibuprofen use.  Denies aspiration/dysphagia.  Denies any BRB/BTS.  Denies any significant dietary changes.    Subjective     The following portions of the patient's history were reviewed and updated as appropriate: allergies, current medications, past family history, past medical history, past social history, past surgical history and problem list.    Allergies   Allergen Reactions    Zithromax [Azithromycin] Anaphylaxis    Mucinex Dm  [Dextromethorphan-Guaifenesin] Hives       Review of Systems  Constitutional: Negative for fever. Negative for chills, diaphoresis, fatigue and unexpected weight change.   HENT: No dysphagia; no changes to vision/hearing/smell/taste; no epistaxis  Eyes: Negative for redness and visual disturbance.   Respiratory: negative for shortness of breath. Negative for chest pain . Negative for cough and chest tightness.   Cardiovascular: Negative for chest pain and palpitations.   Gastrointestinal: As per above.  Endocrine: Negative for cold intolerance and heat intolerance.   Genitourinary: Negative for difficulty urinating, dysuria and frequency.   Musculoskeletal: Chronic arthralgias and myalgias.  "  Skin: Negative for color change, rash and wound.   Neurological: Negative for syncope, weakness and headaches.   Hematological: Negative for adenopathy. Does not bruise/bleed easily.   Psychiatric/Behavioral: Negative for confusion. The patient is not nervous/anxious.    Objective     Physical Exam   Vital Signs: /78   Pulse 107   Temp 97 °F (36.1 °C)   Resp 18   Ht 167.6 cm (66\")   Wt 116 kg (256 lb)   SpO2 100%   BMI 41.32 kg/m²     General Appearance: alert, oriented x 3, no acute distress.  Skin: warm and dry.   HEENT: Atraumatic.  pupils round and reactive to light and accommodation, oral mucosa pink and moist.  Nares patent without epistaxis.  External auditory canals are patent tympanic membranes intact.  Neck: supple, no JVD, trachea midline.  No thyromegaly  Lungs: CTA, unlabored breathing effort.  Heart: RRR, normal S1 and S2, no S3, no rub.  Abdomen: soft, non-tender, no palpable bladder, present bowel sounds to auscultation ×4.  No guarding or rigidity.  Extremities: no clubbing, cyanosis or edema.  Good range of motion actively and passively.  Symmetric muscle strength and development  Neuro: normal speech and mental status.  Cranial nerves II through XII intact.  No anosmia. DTR 2+; proprioception intact.  No focal motor/sensory deficits.        Assessment and Plan      Assessment/Plan:   Diagnoses and all orders for this visit:    1. Rheumatoid arthritis involving multiple sites, unspecified whether rheumatoid factor present (Primary)    2. Complex regional pain syndrome type 2 of right upper extremity    3. Cervical myofascial pain syndrome    4. Mood disorder    5. GERD without esophagitis  -     pantoprazole (Protonix) 20 MG EC tablet; Take 1 tablet by mouth Daily.  Dispense: 90 tablet; Refill: 1      Keep scheduled follow-up appointment with rheumatology.    Vital signs demonstrate hemodynamic stability, blood pressure is at goal.    Discussed need for stress/anxiety reducing " techniques such as prayer/meditation/breathing and counting exercises and avoidance of stress producing environments/situations; will follow clinically.    Discussed need for reduction in sodium/salt/caffeine intake; improve sleep habits as able; inc formal CV exercise program with goal of vigorous activity most, if not all, days of the week; goal of 150 min of sustained HR CV exercise total per week.    Anti - reflux measures, trigger foods and drinks to avoid: Fatty foods, alcohol, chocolate, coffee, tea, caffeinated soft drinks (decaffeinated coffee still has some caffeine), peppermint and spearmint, spices and vinegar, citrus fruits and juices, tomatoes and tomato sauces, and smoking. Other antireflux measures include weight reduction if overweight, avoid tight clothing around the abdomen, elevate the head of her bed 6 inches (May use a bed wedge which is placed between the mattress in box [place]) or blocks under the head of the bed, don't drink or eat for 2 hours before going to bed and avoid lying down immediately after meals.  Begin low-dose PPI therapy.  Planned duration of therapy at least 6 weeks, I have asked her to notify the office should she develop any ill effects to the medication.  May extend to 3 months if needed.          Discussion Summary:    Discussed plan of care in detail with pt today; pt verb understanding and agrees.    I spent 30 minutes caring for Becky on this date of service. This time includes time spent by me in the following activities:preparing for the visit, performing a medically appropriate examination and/or evaluation , counseling and educating the patient/family/caregiver, ordering medications, tests, or procedures, documenting information in the medical record, and care coordination    I have reviewed and updated all copied forward information, as appropriate.  I attest to the accuracy and relevance of any unchanged information.    Follow up:  Return in about 3 months  (around 12/19/2023) for Recheck, Med Change/New Meds.     There are no Patient Instructions on file for this visit.    Reuben Mercado,   09/19/23  21:18 EDT

## 2023-12-19 ENCOUNTER — HOSPITAL ENCOUNTER (EMERGENCY)
Facility: HOSPITAL | Age: 33
Discharge: HOME | End: 2023-12-20
Attending: STUDENT IN AN ORGANIZED HEALTH CARE EDUCATION/TRAINING PROGRAM | Admitting: STUDENT IN AN ORGANIZED HEALTH CARE EDUCATION/TRAINING PROGRAM
Payer: MEDICAID

## 2023-12-19 DIAGNOSIS — R07.9 CHEST PAIN, UNSPECIFIED TYPE: Primary | ICD-10-CM

## 2023-12-19 DIAGNOSIS — N30.00 ACUTE CYSTITIS WITHOUT HEMATURIA: ICD-10-CM

## 2023-12-19 PROCEDURE — 80053 COMPREHEN METABOLIC PANEL: CPT | Performed by: STUDENT IN AN ORGANIZED HEALTH CARE EDUCATION/TRAINING PROGRAM

## 2023-12-19 PROCEDURE — 93005 ELECTROCARDIOGRAM TRACING: CPT | Performed by: STUDENT IN AN ORGANIZED HEALTH CARE EDUCATION/TRAINING PROGRAM

## 2023-12-19 PROCEDURE — 84484 ASSAY OF TROPONIN QUANT: CPT | Performed by: STUDENT IN AN ORGANIZED HEALTH CARE EDUCATION/TRAINING PROGRAM

## 2023-12-19 PROCEDURE — 99284 EMERGENCY DEPT VISIT MOD MDM: CPT

## 2023-12-19 PROCEDURE — 85025 COMPLETE CBC W/AUTO DIFF WBC: CPT | Performed by: STUDENT IN AN ORGANIZED HEALTH CARE EDUCATION/TRAINING PROGRAM

## 2023-12-19 RX ORDER — SODIUM CHLORIDE 0.9 % (FLUSH) 0.9 %
10 SYRINGE (ML) INJECTION AS NEEDED
Status: DISCONTINUED | OUTPATIENT
Start: 2023-12-19 | End: 2023-12-20 | Stop reason: HOSPADM

## 2023-12-19 NOTE — Clinical Note
[facility] EMERGENCY DEPARTMENT  [address] [address]  Phone: [phone]    Becky Garcia was seen and treated in our emergency department on 12/19/2023.  She may return to work on 12/23/2023.         Thank you for choosing [facility].    Ezra Marie MD

## 2023-12-20 ENCOUNTER — APPOINTMENT (OUTPATIENT)
Dept: GENERAL RADIOLOGY | Facility: HOSPITAL | Age: 33
End: 2023-12-20
Payer: MEDICAID

## 2023-12-20 VITALS
OXYGEN SATURATION: 100 % | WEIGHT: 250 LBS | DIASTOLIC BLOOD PRESSURE: 86 MMHG | TEMPERATURE: 97.1 F | HEART RATE: 76 BPM | RESPIRATION RATE: 18 BRPM | BODY MASS INDEX: 40.18 KG/M2 | HEIGHT: 66 IN | SYSTOLIC BLOOD PRESSURE: 125 MMHG

## 2023-12-20 LAB
ALBUMIN SERPL-MCNC: 4.1 G/DL (ref 3.5–5.2)
ALBUMIN/GLOB SERPL: 1.1 G/DL
ALP SERPL-CCNC: 107 U/L (ref 39–117)
ALT SERPL W P-5'-P-CCNC: 6 U/L (ref 1–33)
ANION GAP SERPL CALCULATED.3IONS-SCNC: 8.8 MMOL/L (ref 5–15)
AST SERPL-CCNC: 23 U/L (ref 1–32)
B-HCG UR QL: NEGATIVE
BACTERIA UR QL AUTO: ABNORMAL /HPF
BASOPHILS # BLD AUTO: 0.05 10*3/MM3 (ref 0–0.2)
BASOPHILS NFR BLD AUTO: 0.5 % (ref 0–1.5)
BILIRUB SERPL-MCNC: 0.3 MG/DL (ref 0–1.2)
BILIRUB UR QL STRIP: NEGATIVE
BUN SERPL-MCNC: 14 MG/DL (ref 6–20)
BUN/CREAT SERPL: 15.4 (ref 7–25)
CALCIUM SPEC-SCNC: 9.3 MG/DL (ref 8.6–10.5)
CHLORIDE SERPL-SCNC: 101 MMOL/L (ref 98–107)
CLARITY UR: ABNORMAL
CO2 SERPL-SCNC: 25.2 MMOL/L (ref 22–29)
COLOR UR: YELLOW
CREAT SERPL-MCNC: 0.91 MG/DL (ref 0.57–1)
DEPRECATED RDW RBC AUTO: 42.4 FL (ref 37–54)
EGFRCR SERPLBLD CKD-EPI 2021: 86.1 ML/MIN/1.73
EOSINOPHIL # BLD AUTO: 0.26 10*3/MM3 (ref 0–0.4)
EOSINOPHIL NFR BLD AUTO: 2.6 % (ref 0.3–6.2)
ERYTHROCYTE [DISTWIDTH] IN BLOOD BY AUTOMATED COUNT: 13.2 % (ref 12.3–15.4)
FLUAV RNA RESP QL NAA+PROBE: NOT DETECTED
FLUBV RNA RESP QL NAA+PROBE: NOT DETECTED
GLOBULIN UR ELPH-MCNC: 3.7 GM/DL
GLUCOSE SERPL-MCNC: 113 MG/DL (ref 65–99)
GLUCOSE UR STRIP-MCNC: NEGATIVE MG/DL
HCT VFR BLD AUTO: 37.4 % (ref 34–46.6)
HGB BLD-MCNC: 12 G/DL (ref 12–15.9)
HGB UR QL STRIP.AUTO: NEGATIVE
HOLD SPECIMEN: NORMAL
HOLD SPECIMEN: NORMAL
HYALINE CASTS UR QL AUTO: ABNORMAL /LPF
IMM GRANULOCYTES # BLD AUTO: 0.02 10*3/MM3 (ref 0–0.05)
IMM GRANULOCYTES NFR BLD AUTO: 0.2 % (ref 0–0.5)
KETONES UR QL STRIP: NEGATIVE
LEUKOCYTE ESTERASE UR QL STRIP.AUTO: ABNORMAL
LYMPHOCYTES # BLD AUTO: 2.69 10*3/MM3 (ref 0.7–3.1)
LYMPHOCYTES NFR BLD AUTO: 27.4 % (ref 19.6–45.3)
MCH RBC QN AUTO: 28 PG (ref 26.6–33)
MCHC RBC AUTO-ENTMCNC: 32.1 G/DL (ref 31.5–35.7)
MCV RBC AUTO: 87.4 FL (ref 79–97)
MONOCYTES # BLD AUTO: 0.67 10*3/MM3 (ref 0.1–0.9)
MONOCYTES NFR BLD AUTO: 6.8 % (ref 5–12)
MUCOUS THREADS URNS QL MICRO: ABNORMAL /HPF
NEUTROPHILS NFR BLD AUTO: 6.14 10*3/MM3 (ref 1.7–7)
NEUTROPHILS NFR BLD AUTO: 62.5 % (ref 42.7–76)
NITRITE UR QL STRIP: NEGATIVE
NRBC BLD AUTO-RTO: 0 /100 WBC (ref 0–0.2)
PH UR STRIP.AUTO: 5.5 [PH] (ref 5–8)
PLATELET # BLD AUTO: 233 10*3/MM3 (ref 140–450)
PMV BLD AUTO: 9.4 FL (ref 6–12)
POTASSIUM SERPL-SCNC: 3.9 MMOL/L (ref 3.5–5.2)
PROT SERPL-MCNC: 7.8 G/DL (ref 6–8.5)
PROT UR QL STRIP: NEGATIVE
RBC # BLD AUTO: 4.28 10*6/MM3 (ref 3.77–5.28)
RBC # UR STRIP: ABNORMAL /HPF
REF LAB TEST METHOD: ABNORMAL
SARS-COV-2 RNA RESP QL NAA+PROBE: NOT DETECTED
SODIUM SERPL-SCNC: 135 MMOL/L (ref 136–145)
SP GR UR STRIP: 1.02 (ref 1–1.03)
SQUAMOUS #/AREA URNS HPF: ABNORMAL /HPF
TROPONIN T SERPL HS-MCNC: <6 NG/L
UROBILINOGEN UR QL STRIP: ABNORMAL
WBC # UR STRIP: ABNORMAL /HPF
WBC NRBC COR # BLD AUTO: 9.83 10*3/MM3 (ref 3.4–10.8)
WHOLE BLOOD HOLD COAG: NORMAL
WHOLE BLOOD HOLD SPECIMEN: NORMAL

## 2023-12-20 PROCEDURE — 81001 URINALYSIS AUTO W/SCOPE: CPT | Performed by: NURSE PRACTITIONER

## 2023-12-20 PROCEDURE — 87636 SARSCOV2 & INF A&B AMP PRB: CPT | Performed by: STUDENT IN AN ORGANIZED HEALTH CARE EDUCATION/TRAINING PROGRAM

## 2023-12-20 PROCEDURE — 87086 URINE CULTURE/COLONY COUNT: CPT | Performed by: NURSE PRACTITIONER

## 2023-12-20 PROCEDURE — 81025 URINE PREGNANCY TEST: CPT | Performed by: NURSE PRACTITIONER

## 2023-12-20 PROCEDURE — 71045 X-RAY EXAM CHEST 1 VIEW: CPT

## 2023-12-20 RX ORDER — CEPHALEXIN 500 MG/1
500 CAPSULE ORAL 2 TIMES DAILY
Qty: 10 CAPSULE | Refills: 0 | Status: SHIPPED | OUTPATIENT
Start: 2023-12-20 | End: 2023-12-25

## 2023-12-20 NOTE — ED PROVIDER NOTES
Subjective  History of Present Illness:    Chief Complaint: Chest pain, nausea, cough  History of Present Illness: This is a 33-year-old female patient comes into the ED today complaining of, chest pain, nausea, cough that started approximately 8 hours ago and has been constant.  Patient denies any nausea congestion vomiting diarrhea.  Patient states family has history of hypertension, no immediate cardiac illnesses with mother or father.      Nurses Notes reviewed and agree, including vitals, allergies, social history and prior medical history.       Allergies:    Zithromax [azithromycin] and Mucinex dm  [dextromethorphan-guaifenesin]      Past Surgical History:   Procedure Laterality Date    EYE SURGERY  2007    upper eyelid     EYE SURGERY  2015    upper eyelid         Social History     Socioeconomic History    Marital status: Single   Tobacco Use    Smoking status: Never     Passive exposure: Never    Smokeless tobacco: Never   Vaping Use    Vaping Use: Never used   Substance and Sexual Activity    Alcohol use: No    Drug use: No    Sexual activity: Defer         Family History   Problem Relation Age of Onset    Hypertension Mother     Hypertension Father     Cancer Maternal Grandmother     Arthritis Paternal Grandmother     Cancer Paternal Grandfather        REVIEW OF SYSTEMS: All systems reviewed and not pertinent unless noted.    Review of Systems   Respiratory:  Positive for cough.    Cardiovascular:  Positive for chest pain.   Gastrointestinal:  Positive for nausea.   All other systems reviewed and are negative.      Objective    Physical Exam  Vitals and nursing note reviewed.   Constitutional:       Appearance: Normal appearance.   HENT:      Head: Normocephalic and atraumatic.   Eyes:      Extraocular Movements: Extraocular movements intact.      Pupils: Pupils are equal, round, and reactive to light.   Cardiovascular:      Rate and Rhythm: Normal rate and regular rhythm.      Pulses: Normal pulses.            Carotid pulses are 1+ on the right side and 1+ on the left side.       Radial pulses are 1+ on the right side and 1+ on the left side.        Dorsalis pedis pulses are 1+ on the right side and 1+ on the left side.        Posterior tibial pulses are 1+ on the right side and 1+ on the left side.      Heart sounds: Normal heart sounds.   Pulmonary:      Effort: Pulmonary effort is normal.      Breath sounds: Normal breath sounds.   Abdominal:      General: Abdomen is flat. Bowel sounds are normal.      Palpations: Abdomen is soft.   Musculoskeletal:      Cervical back: Normal range of motion and neck supple.   Skin:     Capillary Refill: Capillary refill takes less than 2 seconds.   Neurological:      General: No focal deficit present.      Mental Status: She is alert and oriented to person, place, and time. Mental status is at baseline.      GCS: GCS eye subscore is 4. GCS verbal subscore is 5. GCS motor subscore is 6.      Sensory: Sensation is intact.      Motor: Motor function is intact.      Gait: Gait is intact.   Psychiatric:         Attention and Perception: Attention and perception normal.         Mood and Affect: Mood and affect normal.         Speech: Speech normal.         Behavior: Behavior normal. Behavior is cooperative.       HEART Score for Major Cardiac Events - MDCalc  Calculated on Dec 20 2023 12:22 AM  0 points -> Low Score (0-3 points) Risk of MACE of 0.9-1.7%.    Procedures    ED Course:    ED Course as of 12/20/23 0047   Wed Dec 20, 2023   0012 X-ray shows no ostial abnormality, no infiltrates otherwise normal chest x-ray [KH]   0026 EKG interpreted by me, normal sinus rhythm no concerning ST changes noted, rate of 78 [JE]      ED Course User Index  [JE] Ezra Marie MD  [KH] Eid Cristina APRN       Lab Results (last 24 hours)       Procedure Component Value Units Date/Time    CBC & Differential [[id number]]  (Normal) Collected: 12/19/23 8404    Specimen: Blood Updated:  12/20/23 0005    Narrative:      The following orders were created for panel order CBC & Differential.  Procedure                               Abnormality         Status                     ---------                               -----------         ------                     CBC Auto Differential[[id number]]        Normal              Final result                 Please view results for these tests on the individual orders.    Comprehensive Metabolic Panel [[id number]]  (Abnormal) Collected: 12/19/23 2359    Specimen: Blood Updated: 12/20/23 0019     Glucose 113 mg/dL      BUN 14 mg/dL      Creatinine 0.91 mg/dL      Sodium 135 mmol/L      Potassium 3.9 mmol/L      Chloride 101 mmol/L      CO2 25.2 mmol/L      Calcium 9.3 mg/dL      Total Protein 7.8 g/dL      Albumin 4.1 g/dL      ALT (SGPT) 6 U/L      AST (SGOT) 23 U/L      Alkaline Phosphatase 107 U/L      Total Bilirubin 0.3 mg/dL      Globulin 3.7 gm/dL      A/G Ratio 1.1 g/dL      BUN/Creatinine Ratio 15.4     Anion Gap 8.8 mmol/L      eGFR 86.1 mL/min/1.73     Narrative:      GFR Normal >60  Chronic Kidney Disease <60  Kidney Failure <15      High Sensitivity Troponin T [[id number]]  (Normal) Collected: 12/19/23 2359    Specimen: Blood Updated: 12/20/23 0021     HS Troponin T <6 ng/L     Narrative:      High Sensitive Troponin T Reference Range:  <14.0 ng/L- Negative Female for AMI  <22.0 ng/L- Negative Male for AMI  >=14 - Abnormal Female indicating possible myocardial injury.  >=22 - Abnormal Male indicating possible myocardial injury.   Clinicians would have to utilize clinical acumen, EKG, Troponin, and serial changes to determine if it is an Acute Myocardial Infarction or myocardial injury due to an underlying chronic condition.         CBC Auto Differential [[id number]]  (Normal) Collected: 12/19/23 2359    Specimen: Blood Updated: 12/20/23 0005     WBC 9.83 10*3/mm3      RBC 4.28 10*6/mm3      Hemoglobin 12.0 g/dL      Hematocrit 37.4 %      MCV 87.4 fL       MCH 28.0 pg      MCHC 32.1 g/dL      RDW 13.2 %      RDW-SD 42.4 fl      MPV 9.4 fL      Platelets 233 10*3/mm3      Neutrophil % 62.5 %      Lymphocyte % 27.4 %      Monocyte % 6.8 %      Eosinophil % 2.6 %      Basophil % 0.5 %      Immature Grans % 0.2 %      Neutrophils, Absolute 6.14 10*3/mm3      Lymphocytes, Absolute 2.69 10*3/mm3      Monocytes, Absolute 0.67 10*3/mm3      Eosinophils, Absolute 0.26 10*3/mm3      Basophils, Absolute 0.05 10*3/mm3      Immature Grans, Absolute 0.02 10*3/mm3      nRBC 0.0 /100 WBC     COVID-19 and FLU A/B PCR, 1 HR TAT - Swab, Nasopharynx [[id number]]  (Normal) Collected: 12/20/23 0004    Specimen: Swab from Nasopharynx Updated: 12/20/23 0042     COVID19 Not Detected     Influenza A PCR Not Detected     Influenza B PCR Not Detected    Narrative:      Fact sheet for providers: https://www.fda.gov/media/530398/download    Fact sheet for patients: https://www.fda.gov/media/950388/download    Test performed by PCR.    Pregnancy, Urine - Urine, Clean Catch [[id number]]  (Normal) Collected: 12/20/23 0006    Specimen: Urine, Clean Catch Updated: 12/20/23 0015     HCG, Urine QL Negative    Urinalysis With Culture If Indicated - Urine, Clean Catch [[id number]]  (Abnormal) Collected: 12/20/23 0006    Specimen: Urine, Clean Catch Updated: 12/20/23 0019     Color, UA Yellow     Appearance, UA Cloudy     pH, UA 5.5     Specific Gravity, UA 1.021     Glucose, UA Negative     Ketones, UA Negative     Bilirubin, UA Negative     Blood, UA Negative     Protein, UA Negative     Leuk Esterase, UA Moderate (2+)     Nitrite, UA Negative     Urobilinogen, UA 1.0 E.U./dL    Narrative:      In absence of clinical symptoms, the presence of pyuria, bacteria, and/or nitrites on the urinalysis result does not correlate with infection.    Urinalysis, Microscopic Only - Urine, Clean Catch [[id number]]  (Abnormal) Collected: 12/20/23 0006    Specimen: Urine, Clean Catch Updated: 12/20/23 0036     RBC, UA  None Seen /HPF      WBC, UA 21-50 /HPF      Bacteria, UA 1+ /HPF      Squamous Epithelial Cells, UA 7-12 /HPF      Hyaline Casts, UA None Seen /LPF      Mucus, UA Trace /HPF      Methodology Manual Light Microscopy    Urine Culture - Urine, Urine, Clean Catch [[id number]] Collected: 12/20/23 0006    Specimen: Urine, Clean Catch Updated: 12/20/23 0035             No radiology results from the last 24 hrs     Medical Decision Making  This is a 33-year-old female patient comes into the ED today complaining of, chest pain, nausea, cough that started approximately 8 hours ago and has been constant.  Patient denies any nausea congestion vomiting diarrhea.  Patient states family has history of hypertension, no immediate cardiac illnesses with mother or father.    DDX: includes but is not limited to: COVID-19, influenza, viral upper respiratory illness, NSTEMI, STEMI, chest pain unspecified, pneumonia    Problems Addressed:  Acute cystitis without hematuria: complicated acute illness or injury  Chest pain, unspecified type: complicated acute illness or injury    Amount and/or Complexity of Data Reviewed  Labs: ordered. Decision-making details documented in ED Course.     Details: I have personally reviewed and documented all results  Radiology: ordered and independent interpretation performed. Decision-making details documented in ED Course.     Details: I have personally reviewed and documented all results  ECG/medicine tests: ordered. Decision-making details documented in ED Course.     Details: I have personally reviewed and documented all results  Discussion of management or test interpretation with external provider(s): Discussed assessment, treatment and plan with ER attending    Risk  Prescription drug management.      I took signout on this 33-year-old female who presented with chest pain.  My signout was to follow-up urine and COVID swab.  COVID swab is negative, urine suggestive of urinary tract infection.  Will  treat with Keflex.  Encourage patient to follow-up with primary care doctor to ensure resolution.  Discharged in stable condition.            Final diagnoses:   Chest pain, unspecified type   Acute cystitis without hematuria          Ezra Marie MD  12/20/23 0047

## 2023-12-20 NOTE — DISCHARGE INSTRUCTIONS
You were evaluated for chest pain, we got labs and tested your urine.  This showed that you had a urinary tract infection.  There is no concerns for any problems with your heart or pneumonia and you are now stable for discharge.  We have sent a course of Keflex to your pharmacy and would recommend taking this as directed.  Would recommend follow-up with primary care doctor to ensure that you improve appropriately.  You are now stable for discharge.

## 2023-12-21 LAB — BACTERIA SPEC AEROBE CULT: NORMAL

## 2024-01-25 DIAGNOSIS — M79.18 CERVICAL MYOFASCIAL PAIN SYNDROME: Chronic | ICD-10-CM

## 2024-01-25 DIAGNOSIS — G56.41 COMPLEX REGIONAL PAIN SYNDROME TYPE 2 OF RIGHT UPPER EXTREMITY: ICD-10-CM

## 2024-01-25 DIAGNOSIS — M05.79 RHEUMATOID ARTHRITIS INVOLVING MULTIPLE SITES WITH POSITIVE RHEUMATOID FACTOR: ICD-10-CM

## 2024-01-29 DIAGNOSIS — G56.41 COMPLEX REGIONAL PAIN SYNDROME TYPE 2 OF RIGHT UPPER EXTREMITY: ICD-10-CM

## 2024-01-29 DIAGNOSIS — M79.18 CERVICAL MYOFASCIAL PAIN SYNDROME: Chronic | ICD-10-CM

## 2024-01-29 DIAGNOSIS — M05.79 RHEUMATOID ARTHRITIS INVOLVING MULTIPLE SITES WITH POSITIVE RHEUMATOID FACTOR: ICD-10-CM

## 2024-01-29 RX ORDER — AMITRIPTYLINE HYDROCHLORIDE 10 MG/1
TABLET, FILM COATED ORAL
Qty: 90 TABLET | Refills: 0 | Status: SHIPPED | OUTPATIENT
Start: 2024-01-29

## 2024-01-29 RX ORDER — AMITRIPTYLINE HYDROCHLORIDE 10 MG/1
10 TABLET, FILM COATED ORAL NIGHTLY
Qty: 90 TABLET | Refills: 0 | Status: CANCELLED | OUTPATIENT
Start: 2024-01-29

## 2024-01-29 RX ORDER — GABAPENTIN 600 MG/1
600 TABLET ORAL 2 TIMES DAILY
Qty: 60 TABLET | Refills: 2 | Status: SHIPPED | OUTPATIENT
Start: 2024-01-29

## 2024-01-29 RX ORDER — GABAPENTIN 600 MG/1
600 TABLET ORAL 2 TIMES DAILY
Qty: 60 TABLET | Refills: 3 | Status: CANCELLED | OUTPATIENT
Start: 2024-01-29

## 2024-01-29 RX ORDER — DULOXETIN HYDROCHLORIDE 60 MG/1
CAPSULE, DELAYED RELEASE ORAL
Qty: 90 CAPSULE | Refills: 0 | Status: SHIPPED | OUTPATIENT
Start: 2024-01-29

## 2024-01-29 RX ORDER — DULOXETIN HYDROCHLORIDE 60 MG/1
60 CAPSULE, DELAYED RELEASE ORAL DAILY
Qty: 90 CAPSULE | Refills: 0 | Status: CANCELLED | OUTPATIENT
Start: 2024-01-29

## 2024-01-29 NOTE — TELEPHONE ENCOUNTER
Rx Refill Note  Requested Prescriptions     Pending Prescriptions Disp Refills    gabapentin (NEURONTIN) 600 MG tablet [Pharmacy Med Name: GABAPENTIN 600 MG TABLET] 60 tablet 2     Sig: TAKE 1 TABLET BY MOUTH TWICE A DAY     Signed Prescriptions Disp Refills    DULoxetine (CYMBALTA) 60 MG capsule 90 capsule 0     Sig: TAKE ONE CAPSULE BY MOUTH DAILY     Authorizing Provider: MARCOS DURBIN     Ordering User: HERBIE GRAHAM    amitriptyline (ELAVIL) 10 MG tablet 90 tablet 0     Sig: TAKE ONE TABLET BY MOUTH ONCE NIGHTLY     Authorizing Provider: MARCOS DURBIN     Ordering User: HERBIE GRAHAM      Last office visit with prescribing clinician: 9/19/2023   Last telemedicine visit with prescribing clinician: Visit date not found   Next office visit with prescribing clinician: 1/29/2024                         Would you like a call back once the refill request has been completed: [] Yes [] No    If the office needs to give you a call back, can they leave a voicemail: [] Yes [] No    Herbie Graham MA  01/29/24, 17:40 EST

## 2024-01-29 NOTE — TELEPHONE ENCOUNTER
Rx Refill Note  Requested Prescriptions     Pending Prescriptions Disp Refills    amitriptyline (ELAVIL) 10 MG tablet 90 tablet 0     Sig: Take 1 tablet by mouth Every Night.    DULoxetine (CYMBALTA) 60 MG capsule 90 capsule 0     Sig: Take 1 capsule by mouth Daily.    gabapentin (NEURONTIN) 600 MG tablet 60 tablet 3     Sig: Take 1 tablet by mouth 2 (Two) Times a Day.      Last office visit with prescribing clinician: 9/19/2023   Last telemedicine visit with prescribing clinician: Visit date not found   Next office visit with prescribing clinician: Visit date not found     Marium Fernandez MA  01/29/24, 15:14 EST

## 2024-01-30 DIAGNOSIS — G56.41 COMPLEX REGIONAL PAIN SYNDROME TYPE 2 OF RIGHT UPPER EXTREMITY: ICD-10-CM

## 2024-01-30 DIAGNOSIS — M79.18 CERVICAL MYOFASCIAL PAIN SYNDROME: Chronic | ICD-10-CM

## 2024-01-31 RX ORDER — AMITRIPTYLINE HYDROCHLORIDE 10 MG/1
TABLET, FILM COATED ORAL
Qty: 90 TABLET | Refills: 0 | OUTPATIENT
Start: 2024-01-31

## 2024-03-15 DIAGNOSIS — K21.9 GERD WITHOUT ESOPHAGITIS: ICD-10-CM

## 2024-03-15 RX ORDER — PANTOPRAZOLE SODIUM 20 MG/1
20 TABLET, DELAYED RELEASE ORAL DAILY
Qty: 90 TABLET | Refills: 1 | Status: SHIPPED | OUTPATIENT
Start: 2024-03-15

## 2024-03-20 ENCOUNTER — OFFICE VISIT (OUTPATIENT)
Dept: FAMILY MEDICINE CLINIC | Facility: CLINIC | Age: 34
End: 2024-03-20
Payer: MEDICAID

## 2024-03-20 VITALS
HEIGHT: 66 IN | SYSTOLIC BLOOD PRESSURE: 125 MMHG | DIASTOLIC BLOOD PRESSURE: 95 MMHG | OXYGEN SATURATION: 99 % | BODY MASS INDEX: 40.98 KG/M2 | HEART RATE: 83 BPM | WEIGHT: 255 LBS

## 2024-03-20 DIAGNOSIS — E66.01 CLASS 3 SEVERE OBESITY DUE TO EXCESS CALORIES WITHOUT SERIOUS COMORBIDITY WITH BODY MASS INDEX (BMI) OF 40.0 TO 44.9 IN ADULT: ICD-10-CM

## 2024-03-20 DIAGNOSIS — R53.83 FATIGUE, UNSPECIFIED TYPE: ICD-10-CM

## 2024-03-20 DIAGNOSIS — G58.9 PINCHED NERVE IN NECK: Primary | ICD-10-CM

## 2024-03-20 RX ORDER — METHOCARBAMOL 500 MG/1
500 TABLET, FILM COATED ORAL 4 TIMES DAILY PRN
Qty: 30 TABLET | Refills: 0 | Status: SHIPPED | OUTPATIENT
Start: 2024-03-20

## 2024-03-20 RX ORDER — HYDROXYCHLOROQUINE SULFATE 200 MG/1
TABLET, FILM COATED ORAL
COMMUNITY
Start: 2024-02-13

## 2024-03-20 NOTE — PATIENT INSTRUCTIONS
Managing pain and stiffness of a pinched nerve     If directed, put ice on the affected area. To do this:  If you have a removable collar, splint, or brace, remove it as told by your health care provider.  Put ice in a plastic bag.  Place a towel between your skin and the bag.  Leave the ice on for 20 minutes, 2-3 times a day.  Remove the ice if your skin turns bright red. This is very important. If you cannot feel pain, heat, or cold, you have a greater risk of damage to the area.  If directed, apply heat to the affected area before you exercise. Use the heat source that your health care provider recommends, such as a moist heat pack or a heating pad.  Place a towel between your skin and the heat source.  Leave the heat on for 20-30 minutes.  Remove the heat if your skin turns bright red. This is especially important if you are unable to feel pain, heat, or cold. You may have a greater risk of getting burned

## 2024-03-20 NOTE — PROGRESS NOTES
"                      Established Patient        Chief Complaint:   Chief Complaint   Patient presents with    Shoulder Pain     Pt sts she thinks she may have pinched nerve in neck on R side, R hand is numb and it runs up into her head    Obesity     Pt wants to discuss weight loss options         History of Present Illness:    Becky Garcia is a 33 y.o. female who presents today for right neck/shoulder pain. Reports that symptoms started started Friday. Patient has RA and states \" she has pinched nerves all the time\". Says that a muscle relaxer usually resolves symptoms. Denies any history of trama/injury to side. Has some numbness and tingling in that hand that is present all the time.     Patient would also like to discuss weight loss options today. Feels like she would benefit from losing weight. Breakfast- skipped, Lunch is a sand which, Dinner- largest meal that is more carb heavy with pastas. Snacks include candy. Reports that she drinks diet soda in the morning and more water in the evening. Reports that she recently started attending warm water pool classes. Denies family history of pancreatitis and thyroid cancer. Reports that she tried Nutra system and that was not effective.    Subjective     The following portions of the patient's history were reviewed and updated as appropriate: allergies, current medications, past family history, past medical history, past social history, past surgical history and problem list.    ALLERGIES  Allergies   Allergen Reactions    Zithromax [Azithromycin] Anaphylaxis    Mucinex Dm  [Dextromethorphan-Guaifenesin] Hives       ROS  Review of Systems   Constitutional:  Positive for fever.   Respiratory:  Negative for cough, shortness of breath and wheezing.    Cardiovascular:  Negative for chest pain, palpitations and leg swelling.   Gastrointestinal:  Positive for nausea. Negative for constipation, diarrhea and vomiting.       Objective     Vital Signs:   /95   " "Pulse 83   Ht 167.6 cm (66\")   Wt 116 kg (255 lb)   SpO2 99%   BMI 41.16 kg/m²     Class 3 Severe Obesity (BMI >=40). Obesity-related health conditions include the following: none. Obesity is worsening. BMI is is above average; BMI management plan is completed. We discussed low calorie, low carb based diet program, portion control, increasing exercise, Weight Watchers or other Commercial based weight reduction program, and Information on healthy weight added to patient's after visit summary.          Physical Exam   Physical Exam  Vitals and nursing note reviewed.   Constitutional:       Appearance: Normal appearance. She is obese.   Eyes:      Extraocular Movements: Extraocular movements intact.      Pupils: Pupils are equal, round, and reactive to light.   Cardiovascular:      Rate and Rhythm: Normal rate and regular rhythm.      Pulses: Normal pulses.      Heart sounds: Normal heart sounds.   Pulmonary:      Effort: Pulmonary effort is normal.      Breath sounds: Normal breath sounds.   Musculoskeletal:      Cervical back: Normal range of motion and neck supple.   Skin:     General: Skin is warm and dry.   Neurological:      General: No focal deficit present.      Mental Status: She is alert and oriented to person, place, and time.   Psychiatric:         Mood and Affect: Mood normal.         Behavior: Behavior normal.         Assessment and Plan      Assessment/Plan:   Diagnoses and all orders for this visit:    1. Pinched nerve in neck (Primary)  -     methocarbamol (ROBAXIN) 500 MG tablet; Take 1 tablet by mouth 4 (Four) Times a Day As Needed for Muscle Spasms.  Dispense: 30 tablet; Refill: 0    2. Class 3 severe obesity due to excess calories without serious comorbidity with body mass index (BMI) of 40.0 to 44.9 in adult  -     Hemoglobin A1c    3. Fatigue, unspecified type  -     TSH  -     T4, free    Encouraged patient to count daily caloric intake and work on restricting calories.  Encouraged patient " to participate in daily moderate exercise for a minimum of 150 minutes a week.   Discussed weight loss plans such as weight watchers.  Encouraged patient to stop having a large carb heavy meal at dinner time. Discussed instead to have smaller healthier meals with lean proteins such as grilled chicken.     I will contact patient regarding test results and provide instructions regarding any necessary changes in plan of care.    Risks, benefits, and potential side effects of current/new medications reviewed with patient.  Patient voiced understanding and wished to proceed with treatment.    Patient was encouraged to keep me informed of any acute changes, lack of improvement, or any new concerning symptoms.    Discussion Summary:  Discussed plan of care in detail with pt today; pt verb understanding and agrees.     I, BEATRIZ Schuler, personally performed the services described in this documentation, as scribed by Pedro HENDERSON student in my presence, and is both accurate and complete.    I spent 30 minutes caring for Becky on this date of service. This time includes time spent by me in the following activities:preparing for the visit, obtaining and/or reviewing a separately obtained history, performing a medically appropriate examination and/or evaluation , counseling and educating the patient/family/caregiver, ordering medications, tests, or procedures, and documenting information in the medical record      I have reviewed and updated all copied forward information, as appropriate.  I attest to the accuracy and relevance of any unchanged information.      Follow up:  Return in about 3 months (around 6/20/2024) for Next scheduled follow up, Recheck.     Patient Education:  Patient Instructions   Managing pain and stiffness of a pinched nerve     If directed, put ice on the affected area. To do this:  If you have a removable collar, splint, or brace, remove it as told by your health care provider.  Put ice  in a plastic bag.  Place a towel between your skin and the bag.  Leave the ice on for 20 minutes, 2-3 times a day.  Remove the ice if your skin turns bright red. This is very important. If you cannot feel pain, heat, or cold, you have a greater risk of damage to the area.  If directed, apply heat to the affected area before you exercise. Use the heat source that your health care provider recommends, such as a moist heat pack or a heating pad.  Place a towel between your skin and the heat source.  Leave the heat on for 20-30 minutes.  Remove the heat if your skin turns bright red. This is especially important if you are unable to feel pain, heat, or cold. You may have a greater risk of getting burned    BEATRIZ Schuler  03/31/24  20:38 EDT          Please note that portions of this note may have been completed with a voice recognition program.

## 2024-03-21 LAB
HBA1C MFR BLD: 5.1 % (ref 4.8–5.6)
T4 FREE SERPL-MCNC: 1.16 NG/DL (ref 0.82–1.77)
TSH SERPL DL<=0.005 MIU/L-ACNC: 0.58 UIU/ML (ref 0.45–4.5)

## 2024-05-13 DIAGNOSIS — M05.79 RHEUMATOID ARTHRITIS INVOLVING MULTIPLE SITES WITH POSITIVE RHEUMATOID FACTOR: ICD-10-CM

## 2024-05-13 DIAGNOSIS — G56.41 COMPLEX REGIONAL PAIN SYNDROME TYPE 2 OF RIGHT UPPER EXTREMITY: ICD-10-CM

## 2024-05-13 RX ORDER — GABAPENTIN 600 MG/1
600 TABLET ORAL 2 TIMES DAILY
Qty: 60 TABLET | Refills: 2 | Status: SHIPPED | OUTPATIENT
Start: 2024-05-13

## 2024-05-13 NOTE — TELEPHONE ENCOUNTER
Caller: GarciaBecky    Relationship: Self    Best call back number: [phone]    Requested Prescriptions:   Requested Prescriptions     Pending Prescriptions Disp Refills    gabapentin (NEURONTIN) 600 MG tablet 60 tablet 2     Sig: Take 1 tablet by mouth 2 (Two) Times a Day.        Pharmacy where request should be sent: [facility] [id number] [address] AT [facility] [phone] [facility] [phone] FX     Last office visit with prescribing clinician: 9/19/2023   Last telemedicine visit with prescribing clinician: Visit date not found   Next office visit with prescribing clinician: 6/20/2024         Does the patient have less than a 3 day supply:  [x] Yes  [] No    Would you like a call back once the refill request has been completed: [] Yes [x] No    If the office needs to give you a call back, can they leave a voicemail: [] Yes [x] No    Andreia Knowles Rep   05/13/24 15:53 EDT

## 2024-05-13 NOTE — TELEPHONE ENCOUNTER
Rx Refill Note  Requested Prescriptions     Pending Prescriptions Disp Refills    gabapentin (NEURONTIN) 600 MG tablet 60 tablet 2     Sig: Take 1 tablet by mouth 2 (Two) Times a Day.      Last office visit with prescribing clinician: 9/19/2023   Last telemedicine visit with prescribing clinician: Visit date not found   Next office visit with prescribing clinician: 6/20/2024                         Would you like a call back once the refill request has been completed: [] Yes [] No    If the office needs to give you a call back, can they leave a voicemail: [] Yes [] No    Remedios Copeland MA  05/13/24, 16:08 EDT

## 2024-06-20 ENCOUNTER — OFFICE VISIT (OUTPATIENT)
Dept: FAMILY MEDICINE CLINIC | Facility: CLINIC | Age: 34
End: 2024-06-20
Payer: MEDICAID

## 2024-06-20 ENCOUNTER — TELEPHONE (OUTPATIENT)
Dept: FAMILY MEDICINE CLINIC | Facility: CLINIC | Age: 34
End: 2024-06-20

## 2024-06-20 VITALS
HEART RATE: 94 BPM | HEIGHT: 66 IN | OXYGEN SATURATION: 100 % | WEIGHT: 249.6 LBS | DIASTOLIC BLOOD PRESSURE: 80 MMHG | BODY MASS INDEX: 40.11 KG/M2 | SYSTOLIC BLOOD PRESSURE: 128 MMHG

## 2024-06-20 DIAGNOSIS — F39 MOOD DISORDER: ICD-10-CM

## 2024-06-20 DIAGNOSIS — G56.41 COMPLEX REGIONAL PAIN SYNDROME TYPE 2 OF RIGHT UPPER EXTREMITY: Primary | ICD-10-CM

## 2024-06-20 DIAGNOSIS — M05.79 RHEUMATOID ARTHRITIS INVOLVING MULTIPLE SITES WITH POSITIVE RHEUMATOID FACTOR: ICD-10-CM

## 2024-06-20 DIAGNOSIS — Z79.61 LONG-TERM CURRENT USE OF IMMUNOMODULATOR: ICD-10-CM

## 2024-06-20 RX ORDER — GABAPENTIN 800 MG/1
800 TABLET ORAL 2 TIMES DAILY
Qty: 60 TABLET | Refills: 3 | Status: SHIPPED | OUTPATIENT
Start: 2024-06-20

## 2024-06-20 RX ORDER — HYDROXYCHLOROQUINE SULFATE 200 MG/1
400 TABLET, FILM COATED ORAL 2 TIMES DAILY
Qty: 120 TABLET | Refills: 3 | Status: SHIPPED | OUTPATIENT
Start: 2024-06-20

## 2024-06-20 RX ORDER — ETANERCEPT 50 MG/ML
50 SOLUTION SUBCUTANEOUS WEEKLY
Qty: 4 ML | Refills: 3 | Status: SHIPPED | OUTPATIENT
Start: 2024-06-20

## 2024-06-20 NOTE — TELEPHONE ENCOUNTER
Caller: [facility] [id number] - [place], [place] - [address] [place] [address] AT [facility]. - [phone]  - [phone] FX    Relationship: Pharmacy    Best call back number: [phone]     What was the call regarding:   [facility] WOULD LIKE A CALL BACK REGARDING NEEDING CLARIFICATION ON THE CHANGE ON PATIENT'S MEDICATION DUE TO THE MAX BEING ONLY 400 A DAY AND THE MEDICATION THAT WAS SENT  A DAY       hydroxychloroquine (PLAQUENIL) 200 MG tablet

## 2024-06-20 NOTE — PROGRESS NOTES
Established Patient        Chief Complaint: CRPS/mood disorder.       Becky Garcia is a 33 y.o. female    History of Present Illness:   Here today in follow-up of her mood disorder, complex regional pain syndrome of the right upper extremity and rheumatoid arthritis.    Unfortunately, patient's previous rheumatology practice has closed, transitioning into different healthcare organizations.  Patient needs a new referral to establish with rheumatology once again.  She does continue to utilize her immunomodulators therapies, they have provided substantial improvement to her symptoms, as well as meaningful improvement to her ADL participation.    Denies chest pain, syncope, palpitations or vertigo.  Denies fever, chills or night sweats.  Maintains an active lifestyle, balanced dietary intake; reports good hydration habits.  Denies hematuria/dysuria.  Denies any BRB/BTS.  No new rashes.  Denies orthopnea, epistaxis or hemoptysis.    Subjective     The following portions of the patient's history were reviewed and updated as appropriate: allergies, current medications, past family history, past medical history, past social history, past surgical history and problem list.    Allergies   Allergen Reactions    Zithromax [Azithromycin] Anaphylaxis    Mucinex Dm  [Dextromethorphan-Guaifenesin] Hives       Review of Systems  Constitutional: Negative for fever. Negative for chills, diaphoresis, fatigue and unexpected weight change.   HENT: No dysphagia; no changes to vision/hearing/smell/taste; no epistaxis  Eyes: Negative for redness and visual disturbance.   Respiratory: negative for shortness of breath. Negative for chest pain . Negative for cough and chest tightness.   Cardiovascular: Negative for chest pain and palpitations.   Gastrointestinal: As per above.  Endocrine: Negative for cold intolerance and heat intolerance.   Genitourinary: Negative for difficulty urinating, dysuria and  "frequency.   Musculoskeletal: Chronic arthralgias and myalgias.   Skin: Negative for color change, rash and wound.   Neurological: Negative for syncope, weakness and headaches.   Hematological: Negative for adenopathy. Does not bruise/bleed easily.   Psychiatric/Behavioral: Negative for confusion. The patient is not nervous/anxious.    Objective     Physical Exam   Vital Signs: /80   Pulse 94   Ht 167.6 cm (65.98\")   Wt 113 kg (249 lb 9.6 oz)   SpO2 100%   BMI 40.31 kg/m²     General Appearance: alert, oriented x 3, no acute distress.  Skin: warm and dry.   HEENT: Atraumatic.  pupils round and reactive to light and accommodation, oral mucosa pink and moist.  Nares patent without epistaxis.  External auditory canals are patent tympanic membranes intact.  Neck: supple, no JVD, trachea midline.  No thyromegaly  Lungs: CTA, unlabored breathing effort.  Heart: RRR, normal S1 and S2, no S3, no rub.  Abdomen: soft, non-tender, no palpable bladder, present bowel sounds to auscultation ×4.  No guarding or rigidity.  Extremities: no clubbing, cyanosis or edema.  Good range of motion actively and passively.  Symmetric muscle strength and development  Neuro: normal speech and mental status.  Cranial nerves II through XII intact.  No anosmia. DTR 2+; proprioception intact.  No focal motor/sensory deficits.        Assessment and Plan      Assessment/Plan:   Diagnoses and all orders for this visit:    1. Complex regional pain syndrome type 2 of right upper extremity (Primary)  -     gabapentin (NEURONTIN) 800 MG tablet; Take 1 tablet by mouth 2 (Two) Times a Day.  Dispense: 60 tablet; Refill: 3    2. Rheumatoid arthritis involving multiple sites with positive rheumatoid factor  -     gabapentin (NEURONTIN) 800 MG tablet; Take 1 tablet by mouth 2 (Two) Times a Day.  Dispense: 60 tablet; Refill: 3  -     hydroxychloroquine (PLAQUENIL) 200 MG tablet; Take 2 tablets by mouth 2 (Two) Times a Day.  Dispense: 120 tablet; " Refill: 3  -     Enbrel Mini 50 MG/ML solution cartridge; Inject 50 mg under the skin into the appropriate area as directed 1 (One) Time Per Week.  Dispense: 4 mL; Refill: 3  -     Ambulatory Referral to Rheumatology  -     Comprehensive Metabolic Panel  -     CBC & Differential    3. Long-term current use of immunomodulator  -     Ambulatory Referral to Rheumatology  -     Comprehensive Metabolic Panel  -     CBC & Differential    4. Mood disorder      Last eye exam February 2024; no abnml findings.    CBC/CMP today.    Increase dose of gabapentin; have asked patient to notify the office of response to the dose adjustment.    I have refilled patient's hydroxychloroquine and Enbrel today.  New referral to establish with rheumatologist again.    Vital signs demonstrate hemodynamic stability.    Discussed need for stress/anxiety reducing techniques such as prayer/meditation/breathing and counting exercises and avoidance of stress producing environments/situations; will follow clinically.      Discussion Summary:    Discussed plan of care in detail with pt today; pt verb understanding and agrees.    I spent 40 minutes caring for Becky on this date of service. This time includes time spent by me in the following activities:preparing for the visit, reviewing tests, performing a medically appropriate examination and/or evaluation , counseling and educating the patient/family/caregiver, ordering medications, tests, or procedures, referring and communicating with other health care professionals , documenting information in the medical record, and care coordination    I have reviewed and updated all copied forward information, as appropriate.  I attest to the accuracy and relevance of any unchanged information.    Follow up:  Return in about 6 months (around 12/20/2024) for Recheck, Med Change/New Meds.     There are no Patient Instructions on file for this visit.    Reuben Mercado DO  06/20/24  14:23 EDT

## 2024-06-21 LAB
ALBUMIN SERPL-MCNC: 4.2 G/DL (ref 3.5–5.2)
ALBUMIN/GLOB SERPL: 1.8 G/DL
ALP SERPL-CCNC: 92 U/L (ref 39–117)
ALT SERPL-CCNC: 9 U/L (ref 1–33)
AST SERPL-CCNC: 15 U/L (ref 1–32)
BASOPHILS # BLD AUTO: 0.06 10*3/MM3 (ref 0–0.2)
BASOPHILS NFR BLD AUTO: 0.8 % (ref 0–1.5)
BILIRUB SERPL-MCNC: 0.5 MG/DL (ref 0–1.2)
BUN SERPL-MCNC: 10 MG/DL (ref 6–20)
BUN/CREAT SERPL: 13.9 (ref 7–25)
CALCIUM SERPL-MCNC: 9 MG/DL (ref 8.6–10.5)
CHLORIDE SERPL-SCNC: 108 MMOL/L (ref 98–107)
CO2 SERPL-SCNC: 22.5 MMOL/L (ref 22–29)
CREAT SERPL-MCNC: 0.72 MG/DL (ref 0.57–1)
EGFRCR SERPLBLD CKD-EPI 2021: 114.1 ML/MIN/1.73
EOSINOPHIL # BLD AUTO: 0.16 10*3/MM3 (ref 0–0.4)
EOSINOPHIL NFR BLD AUTO: 2.2 % (ref 0.3–6.2)
ERYTHROCYTE [DISTWIDTH] IN BLOOD BY AUTOMATED COUNT: 13.3 % (ref 12.3–15.4)
GLOBULIN SER CALC-MCNC: 2.4 GM/DL
GLUCOSE SERPL-MCNC: 95 MG/DL (ref 65–99)
HCT VFR BLD AUTO: 36.6 % (ref 34–46.6)
HGB BLD-MCNC: 11.7 G/DL (ref 12–15.9)
IMM GRANULOCYTES # BLD AUTO: 0.01 10*3/MM3 (ref 0–0.05)
IMM GRANULOCYTES NFR BLD AUTO: 0.1 % (ref 0–0.5)
LYMPHOCYTES # BLD AUTO: 1.75 10*3/MM3 (ref 0.7–3.1)
LYMPHOCYTES NFR BLD AUTO: 24.2 % (ref 19.6–45.3)
MCH RBC QN AUTO: 28.4 PG (ref 26.6–33)
MCHC RBC AUTO-ENTMCNC: 32 G/DL (ref 31.5–35.7)
MCV RBC AUTO: 88.8 FL (ref 79–97)
MONOCYTES # BLD AUTO: 0.49 10*3/MM3 (ref 0.1–0.9)
MONOCYTES NFR BLD AUTO: 6.8 % (ref 5–12)
NEUTROPHILS # BLD AUTO: 4.77 10*3/MM3 (ref 1.7–7)
NEUTROPHILS NFR BLD AUTO: 65.9 % (ref 42.7–76)
NRBC BLD AUTO-RTO: 0 /100 WBC (ref 0–0.2)
PLATELET # BLD AUTO: 240 10*3/MM3 (ref 140–450)
POTASSIUM SERPL-SCNC: 4.1 MMOL/L (ref 3.5–5.2)
PROT SERPL-MCNC: 6.6 G/DL (ref 6–8.5)
RBC # BLD AUTO: 4.12 10*6/MM3 (ref 3.77–5.28)
SODIUM SERPL-SCNC: 142 MMOL/L (ref 136–145)
WBC # BLD AUTO: 7.24 10*3/MM3 (ref 3.4–10.8)

## 2024-07-11 DIAGNOSIS — M05.79 RHEUMATOID ARTHRITIS INVOLVING MULTIPLE SITES WITH POSITIVE RHEUMATOID FACTOR: ICD-10-CM

## 2024-07-29 DIAGNOSIS — M05.79 RHEUMATOID ARTHRITIS INVOLVING MULTIPLE SITES WITH POSITIVE RHEUMATOID FACTOR: ICD-10-CM

## 2024-07-29 RX ORDER — HYDROXYCHLOROQUINE SULFATE 200 MG/1
400 TABLET, FILM COATED ORAL DAILY
Qty: 60 TABLET | Refills: 3 | Status: SHIPPED | OUTPATIENT
Start: 2024-07-29

## 2024-07-29 RX ORDER — HYDROXYCHLOROQUINE SULFATE 200 MG/1
400 TABLET, FILM COATED ORAL 2 TIMES DAILY
Qty: 120 TABLET | Refills: 3 | Status: CANCELLED | OUTPATIENT
Start: 2024-07-29

## 2024-08-20 ENCOUNTER — OFFICE VISIT (OUTPATIENT)
Dept: FAMILY MEDICINE CLINIC | Facility: CLINIC | Age: 34
End: 2024-08-20
Payer: MEDICAID

## 2024-08-20 VITALS
DIASTOLIC BLOOD PRESSURE: 84 MMHG | SYSTOLIC BLOOD PRESSURE: 126 MMHG | WEIGHT: 246 LBS | BODY MASS INDEX: 39.53 KG/M2 | HEART RATE: 81 BPM | HEIGHT: 66 IN | OXYGEN SATURATION: 100 %

## 2024-08-20 DIAGNOSIS — G56.41 COMPLEX REGIONAL PAIN SYNDROME TYPE 2 OF RIGHT UPPER EXTREMITY: ICD-10-CM

## 2024-08-20 DIAGNOSIS — M05.79 RHEUMATOID ARTHRITIS INVOLVING MULTIPLE SITES WITH POSITIVE RHEUMATOID FACTOR: ICD-10-CM

## 2024-08-20 DIAGNOSIS — M79.18 CERVICAL MYOFASCIAL PAIN SYNDROME: Primary | Chronic | ICD-10-CM

## 2024-08-20 DIAGNOSIS — M25.50 POLYARTHRALGIA: ICD-10-CM

## 2024-08-20 PROCEDURE — 1125F AMNT PAIN NOTED PAIN PRSNT: CPT | Performed by: FAMILY MEDICINE

## 2024-08-20 PROCEDURE — 99214 OFFICE O/P EST MOD 30 MIN: CPT | Performed by: FAMILY MEDICINE

## 2024-08-20 RX ORDER — IBUPROFEN 200 MG
200 TABLET ORAL
COMMUNITY
End: 2024-08-20 | Stop reason: SINTOL

## 2024-08-20 RX ORDER — DICLOFENAC SODIUM 25 MG/1
25 TABLET, DELAYED RELEASE ORAL 2 TIMES DAILY
Qty: 180 TABLET | Refills: 2 | Status: SHIPPED | OUTPATIENT
Start: 2024-08-20

## 2024-08-20 NOTE — PROGRESS NOTES
Established Patient        Chief Complaint: CRPS/mood disorder.       Becky Garcia is a 33 y.o. female    History of Present Illness:   Here today in scheduled follow-up visit of her cervical myofascial pain syndrome, complex regional pain syndrome, polyarthralgia and rheumatoid arthritis.    Irritability, GI, with use of ibuprofen routinely daily.  Patient does report that the ibuprofen provides significant improvement to her arthralgias.    Denies chest pain, syncope, palpitations or vertigo.  Denies fever, chills or night sweats.  Maintains an active lifestyle, balanced dietary intake; reports good hydration habits.  Denies hematuria/dysuria.  Denies any BRB/BTS.  No new rashes.  Denies orthopnea, epistaxis or hemoptysis.      Subjective     The following portions of the patient's history were reviewed and updated as appropriate: allergies, current medications, past family history, past medical history, past social history, past surgical history and problem list.    Allergies   Allergen Reactions    Zithromax [Azithromycin] Anaphylaxis    Mucinex Dm  [Dextromethorphan-Guaifenesin] Hives       Review of Systems  Constitutional: Negative for fever. Negative for chills, diaphoresis, fatigue and unexpected weight change.   HENT: No dysphagia; no changes to vision/hearing/smell/taste; no epistaxis  Eyes: Negative for redness and visual disturbance.   Respiratory: negative for shortness of breath. Negative for chest pain . Negative for cough and chest tightness.   Cardiovascular: Negative for chest pain and palpitations.   Gastrointestinal: As per above.  Endocrine: Negative for cold intolerance and heat intolerance.   Genitourinary: Negative for difficulty urinating, dysuria and frequency.   Musculoskeletal: Chronic arthralgias and myalgias.   Skin: Negative for color change, rash and wound.   Neurological: Negative for syncope, weakness and headaches.   Hematological: Negative for  "adenopathy. Does not bruise/bleed easily.   Psychiatric/Behavioral: Negative for confusion. The patient is not nervous/anxious.    Objective     Physical Exam   Vital Signs: /84   Pulse 81   Ht 167.6 cm (66\")   Wt 112 kg (246 lb)   SpO2 100%   BMI 39.71 kg/m²     General Appearance: alert, oriented x 3, no acute distress.  Skin: warm and dry.   HEENT: Atraumatic.  pupils round and reactive to light and accommodation, oral mucosa pink and moist.  Nares patent without epistaxis.  External auditory canals are patent tympanic membranes intact.  Neck: supple, no JVD, trachea midline.  No thyromegaly  Lungs: CTA, unlabored breathing effort.  Heart: RRR, normal S1 and S2, no S3, no rub.  Abdomen: soft, non-tender, no palpable bladder, present bowel sounds to auscultation ×4.  No guarding or rigidity.  Extremities: no clubbing, cyanosis or edema.  Good range of motion actively and passively.  Symmetric muscle strength and development  Neuro: normal speech and mental status.  Cranial nerves II through XII intact.  No anosmia. DTR 2+; proprioception intact.  No focal motor/sensory deficits.    Advance Care Planning   ACP discussion was held with the patient during this visit. Patient does not have an advance directive, information provided.         Assessment and Plan      Assessment/Plan:   Diagnoses and all orders for this visit:    1. Cervical myofascial pain syndrome (Primary)  -     diclofenac (VOLTAREN) 25 MG EC tablet; Take 1 tablet by mouth 2 (Two) Times a Day.  Dispense: 180 tablet; Refill: 2    2. Complex regional pain syndrome type 2 of right upper extremity  -     diclofenac (VOLTAREN) 25 MG EC tablet; Take 1 tablet by mouth 2 (Two) Times a Day.  Dispense: 180 tablet; Refill: 2    3. Polyarthralgia  -     diclofenac (VOLTAREN) 25 MG EC tablet; Take 1 tablet by mouth 2 (Two) Times a Day.  Dispense: 180 tablet; Refill: 2    4. Rheumatoid arthritis involving multiple sites with positive rheumatoid " factor  -     diclofenac (VOLTAREN) 25 MG EC tablet; Take 1 tablet by mouth 2 (Two) Times a Day.  Dispense: 180 tablet; Refill: 2    Discontinue use of ibuprofen, as well as any other nonsteroidal anti-inflammatories over-the-counter.  I have provided a prescription for diclofenac, 25 mg twice daily dosing, to be taken with food.  I have asked that she notify the office should she develop any ill effects to the new medication.    Keep scheduled follow-up appointment with rheumatology.    Vital signs demonstrate hemodynamic stability.        Discussion Summary:    Discussed plan of care in detail with pt today; pt verb understanding and agrees.    Follow up:  No follow-ups on file.     There are no Patient Instructions on file for this visit.    I spent 35 minutes caring for Becky on this date of service. This time includes time spent by me in the following activities:preparing for the visit, performing a medically appropriate examination and/or evaluation , counseling and educating the patient/family/caregiver, ordering medications, tests, or procedures, documenting information in the medical record, and care coordination    I confirm accuracy of unchanged data/findings which have been carried forward from previous visit, as well as I have updated appropriately those that have changed.      Reuben Mercado,   08/20/24  09:24 EDT

## 2024-09-30 ENCOUNTER — TELEPHONE (OUTPATIENT)
Dept: FAMILY MEDICINE CLINIC | Facility: CLINIC | Age: 34
End: 2024-09-30
Payer: MEDICAID

## 2024-09-30 NOTE — TELEPHONE ENCOUNTER
Caller: Becky Garcia    Relationship: Self    Best call back number: [phone]     What is the best time to reach you: ANY    Who are you requesting to speak with (clinical staff, provider,  specific staff member): NURSE    Do you know the name of the person who called: PATIENT    What was the call regarding: PATIENT HAD COVID LAST WEEK.  WHAT IS OPINION ON HOW MANY NEGATIVE TEST SHOULD SHE HAVE BEFORE GOING BACK TO WORK AND AROUND OTHER PEOPLE?  HAS AUTO IMMUNE DISORDER.      Is it okay if the provider responds through MyChart: PHONE CALL PLEASE

## 2024-10-02 ENCOUNTER — LAB (OUTPATIENT)
Facility: HOSPITAL | Age: 34
End: 2024-10-02
Payer: MEDICAID

## 2024-10-02 ENCOUNTER — SPECIALTY PHARMACY (OUTPATIENT)
Age: 34
End: 2024-10-02
Payer: MEDICAID

## 2024-10-02 ENCOUNTER — TELEPHONE (OUTPATIENT)
Age: 34
End: 2024-10-02

## 2024-10-02 ENCOUNTER — OFFICE VISIT (OUTPATIENT)
Age: 34
End: 2024-10-02
Payer: MEDICAID

## 2024-10-02 VITALS
SYSTOLIC BLOOD PRESSURE: 126 MMHG | HEART RATE: 93 BPM | HEIGHT: 66 IN | BODY MASS INDEX: 39.37 KG/M2 | TEMPERATURE: 97.7 F | DIASTOLIC BLOOD PRESSURE: 82 MMHG | WEIGHT: 245 LBS

## 2024-10-02 DIAGNOSIS — M79.10 MYALGIA: ICD-10-CM

## 2024-10-02 DIAGNOSIS — G56.41 COMPLEX REGIONAL PAIN SYNDROME TYPE 2 OF RIGHT UPPER EXTREMITY: ICD-10-CM

## 2024-10-02 DIAGNOSIS — R53.82 CHRONIC FATIGUE: ICD-10-CM

## 2024-10-02 DIAGNOSIS — M54.41 CHRONIC RIGHT-SIDED LOW BACK PAIN WITH RIGHT-SIDED SCIATICA: ICD-10-CM

## 2024-10-02 DIAGNOSIS — G89.29 CHRONIC RIGHT-SIDED LOW BACK PAIN WITH RIGHT-SIDED SCIATICA: ICD-10-CM

## 2024-10-02 DIAGNOSIS — M06.09 RHEUMATOID ARTHRITIS OF MULTIPLE SITES WITH NEGATIVE RHEUMATOID FACTOR: Primary | ICD-10-CM

## 2024-10-02 DIAGNOSIS — M06.09 RHEUMATOID ARTHRITIS OF MULTIPLE SITES WITH NEGATIVE RHEUMATOID FACTOR: ICD-10-CM

## 2024-10-02 DIAGNOSIS — Z79.899 HIGH RISK MEDICATION USE: ICD-10-CM

## 2024-10-02 DIAGNOSIS — D84.821 IMMUNOSUPPRESSION DUE TO DRUG THERAPY: ICD-10-CM

## 2024-10-02 DIAGNOSIS — Z79.899 IMMUNOSUPPRESSION DUE TO DRUG THERAPY: ICD-10-CM

## 2024-10-02 PROCEDURE — 82550 ASSAY OF CK (CPK): CPT

## 2024-10-02 PROCEDURE — 85652 RBC SED RATE AUTOMATED: CPT

## 2024-10-02 PROCEDURE — 36415 COLL VENOUS BLD VENIPUNCTURE: CPT

## 2024-10-02 PROCEDURE — 86200 CCP ANTIBODY: CPT

## 2024-10-02 PROCEDURE — 83520 IMMUNOASSAY QUANT NOS NONAB: CPT

## 2024-10-02 PROCEDURE — 86480 TB TEST CELL IMMUN MEASURE: CPT

## 2024-10-02 PROCEDURE — 81374 HLA I TYPING 1 ANTIGEN LR: CPT

## 2024-10-02 PROCEDURE — 86431 RHEUMATOID FACTOR QUANT: CPT

## 2024-10-02 PROCEDURE — 86140 C-REACTIVE PROTEIN: CPT

## 2024-10-02 PROCEDURE — 86038 ANTINUCLEAR ANTIBODIES: CPT

## 2024-10-02 PROCEDURE — 80074 ACUTE HEPATITIS PANEL: CPT

## 2024-10-02 PROCEDURE — 80050 GENERAL HEALTH PANEL: CPT

## 2024-10-02 RX ORDER — CEFDINIR 300 MG/1
CAPSULE ORAL
COMMUNITY
Start: 2024-09-08

## 2024-10-02 NOTE — PROGRESS NOTES
Office Visit       Date: 10/02/2024   Patient Name: Becky Garcia  MRN: [id number]  YOB: 1990    Referring Physician: Reuben Mercado DO     Chief Complaint: Rheumatoid arthritis      History of Present Illness: Becky Garcia is a 33 y.o. female who is here today consultation for rheumatoid arthritis.  The patient has had pain in her hands and knees since 2018.  She went through a large workup and in 2020 she was diagnosed with rheumatoid arthritis by a rheumatologist in [place], [place].  She was apparently seronegative.  She was initially treated with methotrexate and this was ineffective.  Arava was then added to methotrexate but she had side effects including hair loss with this combination.  She then saw a second rheumatologist who started her on Plaquenil.  This seemed to help.  However, that rheumatologist left the practice and she saw a third rheumatologist in 2023 who added Enbrel.    She thinks the Enbrel and the Plaquenil has worked very nicely.  She has less overall pain and has not had recent swelling.  However, her third rheumatologist left the practice and the patient has not had Enbrel for 3 months.  At this point, she has not had any major flares.  She continues on Plaquenil.  She does note that she is having more pain in her hands and knees off Enbrel.    She also takes diclofenac and that seems to give some relief.  She currently has about 2 hours of morning stiffness.  She has sciatica which keeps her awake at night.  She has some limitations of her activities of daily living.    She is up-to-date with her eye exams.  She has tolerated both Plaquenil and Enbrel well.  She has had no major infections or injection site reactions.    It is also notable that she carries a history of complex regional pain syndrome.  She is treated by her primary care provider with gabapentin and amitriptyline for this.      Subjective   Review  of Systems: Review of Systems   Constitutional:  Positive for fatigue. Negative for chills, fever and unexpected weight loss.   HENT:  Negative for dental problem, mouth sores, sinus pressure and swollen glands.    Eyes:  Negative for photophobia, pain and redness.   Respiratory:  Negative for apnea, cough, chest tightness and shortness of breath.    Cardiovascular:  Negative for chest pain and leg swelling.   Gastrointestinal:  Positive for abdominal pain and diarrhea. Negative for nausea and GERD.   Genitourinary:  Negative for dysuria and hematuria.        Recurrent uti   Musculoskeletal:  Positive for myalgias and neck pain.   Skin:  Negative for dry skin, rash, skin lesions and bruise.   Neurological:  Positive for numbness. Negative for dizziness, seizures, syncope, weakness, headache and memory problem.        Tingling   Hematological:  Negative for adenopathy. Bruises/bleeds easily.   Psychiatric/Behavioral:  Positive for sleep disturbance and depressed mood. Negative for suicidal ideas and stress. The patient is not nervous/anxious.         Past Medical History:   Past Medical History:   Diagnosis Date    Arthritis     Depression     Hx of migraine headaches     Kidney stones     Rheumatoid arthritis        Past Surgical History:   Past Surgical History:   Procedure Laterality Date    EYE PTOSIS REPAIR Right     EYE SURGERY  2007    upper eyelid     EYE SURGERY  2015    upper eyelid       Family History:   Family History   Problem Relation Age of Onset    Hypertension Mother     Arthritis Mother     Hypertension Father     Arrhythmia Father     Cancer Maternal Grandmother     Arthritis Paternal Grandmother     Cancer Paternal Grandfather        Social History:   Social History     Socioeconomic History    Marital status: Single   Tobacco Use    Smoking status: Never     Passive exposure: Never    Smokeless tobacco: Never   Vaping Use    Vaping status: Never Used   Substance and Sexual Activity    Alcohol  "use: No    Drug use: No    Sexual activity: Defer       Medications:   Current Outpatient Medications:     acetaminophen (TYLENOL) 650 MG 8 hr tablet, Take 1 tablet by mouth., Disp: , Rfl:     amitriptyline (ELAVIL) 10 MG tablet, TAKE ONE TABLET BY MOUTH ONCE NIGHTLY, Disp: 90 tablet, Rfl: 0    diclofenac (VOLTAREN) 25 MG EC tablet, Take 1 tablet by mouth 2 (Two) Times a Day., Disp: 180 tablet, Rfl: 2    DULoxetine (CYMBALTA) 60 MG capsule, TAKE ONE CAPSULE BY MOUTH DAILY, Disp: 90 capsule, Rfl: 0    Enbrel Mini 50 MG/ML solution cartridge, Inject 50 mg under the skin into the appropriate area as directed 1 (One) Time Per Week., Disp: 4 mL, Rfl: 3    gabapentin (NEURONTIN) 800 MG tablet, Take 1 tablet by mouth 2 (Two) Times a Day., Disp: 60 tablet, Rfl: 3    hydroxychloroquine (PLAQUENIL) 200 MG tablet, Take 2 tablets by mouth Daily., Disp: 60 tablet, Rfl: 3    Melatonin 12 MG tablet, Take  by mouth., Disp: , Rfl:     multivitamin with minerals (MULTIVITAMIN ADULTS PO), Take 1 tablet by mouth Daily., Disp: , Rfl:     pantoprazole (PROTONIX) 20 MG EC tablet, TAKE 1 TABLET BY MOUTH DAILY, Disp: 90 tablet, Rfl: 1    cefdinir (OMNICEF) 300 MG capsule, , Disp: , Rfl:     Allergies:   Allergies   Allergen Reactions    Zithromax [Azithromycin] Anaphylaxis    Mucinex Dm  [Dextromethorphan-Guaifenesin] Hives       I have reviewed and updated the patient's chief complaint, history of present illness, review of systems, past medical history, surgical history, family history, social history, medications and allergy list as appropriate.     Objective    Vital Signs:   Vitals:    10/02/24 1248   BP: 126/82   BP Location: Left arm   Patient Position: Sitting   Cuff Size: Adult   Pulse: 93   Temp: 97.7 °F (36.5 °C)   Weight: 111 kg (245 lb)   Height: 167.6 cm (66\")   PainSc:   4   PainLoc: Generalized     Body mass index is 39.54 kg/m².     Physical Exam:  General: The patient is well-developed and well nourished. Cooperative, " alert and oriented x3. Affect is flat.  Hydration appears normal.   HEENT: Normocephalic and atraumatic. No notable alopecia. Lids and conjunctiva are normal. Pupils are equal and sclera are clear. Oropharynx is clear   NECK: Supple without adenopathy or masses.  Thyroid is generous in size but I feel no nodules.  CARDIOVASCULAR: Regular rate and rhythm. No murmurs, rubs or gallops   LUNGS: Effort is normal. Lungs are clear bilaterally.  ABDOMEN: Soft, obese, and non-tender without palpable masses or hepatosplenomegaly..   EXTREMITIES: Trace to 1+ edema.  No cyanosis or clubbing.  SKIN: Inspection and palpation are normal.  I see no rashes, nodules, psoriatic lesions, nail pits, or tophi.  She does have mild livedo reticularis.  NEUROLOGIC: Gait is normal.  Deep tendon reflexes are 2+ and symmetric.  MUSCULOSKELETAL: Complete joint exam was performed. There was full range of motion of the shoulders, elbows, wrists and hands without soft tissue swelling synovitis or deformities except as noted.  She has adipose tissue on her hands but I see no soft tissue swelling or synovitis.  Hips have good flexion and internal and external rotation.  Knees have no palpable effusions.  There is full extension and flexion.  There is pain in the right knee at full flexion.  Range of motion is well-preserved bilaterally.  Ankles and feet have no soft tissue swelling or synovitis.  BACK:  Straight without scoliosis.  All classic fibromyalgia tender points are present.  Joint Exam 10/02/2024     The following joints were examined and normal:   Left TMJ, Right TMJ, Left Sternoclavicular, Right Sternoclavicular, Left Acromioclavicular, Right Acromioclavicular, Left Glenohumeral, Right Glenohumeral, Left Elbow, Right Elbow, Left Wrist, Right Wrist, Left MCP 1, Right MCP 1, Left MCP 2, Right MCP 2, Left MCP 3, Right MCP 3, Left MCP 4, Right MCP 4, Left MCP 5, Right MCP 5, Left IP (thumb), Right IP (thumb), Left PIP 2 (finger), Right PIP 2  (finger), Left PIP 3 (finger), Right PIP 3 (finger), Left PIP 4 (finger), Right PIP 4 (finger), Left PIP 5 (finger), Right PIP 5 (finger), Left DIP 2 (finger), Right DIP 2 (finger), Left DIP 3 (finger), Right DIP 3 (finger), Left DIP 4 (finger), Right DIP 4 (finger), Left DIP 5 (finger), Right DIP 5 (finger), Left Hip, Right Hip, Left Knee, Right Knee, Left Ankle, Right Ankle, Left Tarsometatarsal, Right Tarsometatarsal, Left MTP 1, Right MTP 1, Left MTP 2, Right MTP 2, Left MTP 3, Right MTP 3, Left MTP 4, Right MTP 4, Left MTP 5, Right MTP 5, Left IP (toe), Right IP (toe), Left PIP 2 (toe), Right PIP 2 (toe), Left PIP 3 (toe), Right PIP 3 (toe), Left PIP 4 (toe), Right PIP 4 (toe), Left PIP 5 (toe), Right PIP 5 (toe)       Patient Global: --  Provider Global: --      Assessment / Plan        Assessment & Plan  Rheumatoid arthritis of multiple sites with negative rheumatoid factor  Chronic diffuse pain since 2018.  Diagnosis 2020.  Apparently seronegative.  Diagnosed by outside rheumatologist and treated initially with methotrexate, then methotrexate and Arava.  These were ineffective and she had side effects.  Plaquenil started 2022.  Enbrel added 2023.    She feels she is worse since being off Enbrel for the past 3 months.  She is having more pain in her hands and knees.  She has no swollen or tender joints today.  She does have prolonged morning stiffness currently.  We will continue Plaquenil.  We discussed possible side effects of hydroxychloroquine including headache, nausea, diarrhea, rare retinal pigmentation, rare neuromuscular toxicity.  Recommend eye exams every 6 to 12 months to monitor for retinal toxicity.  We will restart Enbrel because she feels she did better on this.  We discussed biologic agents at length. Risks and alternative were discussed at length and the option of no treatment was also given. We discussed risks including but not limited to infections which can be unusual,  severe, and  deadly. When possible, these agents should be stopped immediately if infections occur. Unusual infection such as TB and fungal infections can occur. There may be an increased risk of lymphoma with these agents. Other risks can include are multiple sclerosis like illness and worsening of the failure. Infusion or injection reactions whish can be delay have been reported.  Reactivation of daily brain viruses have been reported.  Worsening of COPD has been seen with Orencia.  Elevated lipids, elevations in liver functions, and dangerous changes in blood counts have been seen with certain agents.  Regular monitoring will be required.  I will see her in follow-up in 3 months.  We will obtain several labs and x-rays today.  We will restage her disease.  High risk medication use  Plaquenil.  She is aware of the need for yearly ocular exams.  Immunosuppression due to drug therapy  Enbrel.  We will try to get this approved again.  She knows to stop it if she gets infections.  Chronic right-sided low back pain with right-sided sciatica  Per patient history.  Her primary care provider does have her on gabapentin.  Complex regional pain syndrome type 2 of right upper extremity  Her primary care provider has her on gabapentin and amitriptyline for this.  Myalgia  She complains of chronic muscle pain.  She does have classic fibromyalgia tender points.  Chronic fatigue  This remains a problem.    Orders Placed This Encounter   Procedures    XR Chest 2 View    XR Hand 2 View Bilateral    XR Foot 3+ View Bilateral    CBC Auto Differential    Comprehensive Metabolic Panel    C-reactive Protein    QuantiFERON TB Plus Client Incubated    Hepatitis Panel, Acute    Rheumatoid Factor    Sedimentation Rate    Cyclic Citrul Peptide Antibody, IgG / IgA    14.3.3 ETA, Rheum. Arthritis    WILMAR by IFA, Reflex 9-biomarkers profile    HLA-B27 Antigen    TSH    CK             Follow Up:   Return in about 3 months (around 1/2/2025).        Diogenes SHAHID  MD Salvador  [facility]

## 2024-10-02 NOTE — ASSESSMENT & PLAN NOTE
Chronic diffuse pain since 2018.  Diagnosis 2020.  Apparently seronegative.  Diagnosed by outside rheumatologist and treated initially with methotrexate, then methotrexate and Arava.  These were ineffective and she had side effects.  Plaquenil started 2022.  Enbrel added 2023.    She feels she is worse since being off Enbrel for the past 3 months.  She is having more pain in her hands and knees.  She has no swollen or tender joints today.  She does have prolonged morning stiffness currently.  We will continue Plaquenil.  We discussed possible side effects of hydroxychloroquine including headache, nausea, diarrhea, rare retinal pigmentation, rare neuromuscular toxicity.  Recommend eye exams every 6 to 12 months to monitor for retinal toxicity.  We will restart Enbrel because she feels she did better on this.  We discussed biologic agents at length. Risks and alternative were discussed at length and the option of no treatment was also given. We discussed risks including but not limited to infections which can be unusual,  severe, and deadly. When possible, these agents should be stopped immediately if infections occur. Unusual infection such as TB and fungal infections can occur. There may be an increased risk of lymphoma with these agents. Other risks can include are multiple sclerosis like illness and worsening of the failure. Infusion or injection reactions whish can be delay have been reported.  Reactivation of daily brain viruses have been reported.  Worsening of COPD has been seen with Orencia.  Elevated lipids, elevations in liver functions, and dangerous changes in blood counts have been seen with certain agents.  Regular monitoring will be required.  I will see her in follow-up in 3 months.  We will obtain several labs and x-rays today.  We will restage her disease.

## 2024-10-02 NOTE — TELEPHONE ENCOUNTER
Please preapproved Enbrel.  She has been on this long-term and her rheumatologist recently closed her practice.  She needs a new approval.

## 2024-10-02 NOTE — TELEPHONE ENCOUNTER
PA request has been approved and pharmacy notified (Filling pharmacy will be notified by phone, fax, or submitted prescription)    Authorized Medication: Enbrel  Name of Insurance Approving PA: [facility]  Pharmacy PA Number: [id number]  PA Effective Dates: 10/2/24-10/2/25  Dispensing Pharmacy: [facility]    Fadia Brantley, PharmD, BCPS  Clinical Specialty Pharmacist, Rheumatology   10/2/2024  14:33 EDT

## 2024-10-02 NOTE — TELEPHONE ENCOUNTER
Prior authorization initiated by [facility].  Update will be provided when a determination has been received.     Medication: Enbrel  PA Submission Method: CMM  Case Number/CMM Key: [id number]

## 2024-10-03 LAB
ALBUMIN SERPL-MCNC: 4.1 G/DL (ref 3.5–5.2)
ALBUMIN/GLOB SERPL: 1.4 G/DL
ALP SERPL-CCNC: 89 U/L (ref 39–117)
ALT SERPL W P-5'-P-CCNC: 15 U/L (ref 1–33)
ANION GAP SERPL CALCULATED.3IONS-SCNC: 8.1 MMOL/L (ref 5–15)
AST SERPL-CCNC: 19 U/L (ref 1–32)
BASOPHILS # BLD AUTO: 0.07 10*3/MM3 (ref 0–0.2)
BASOPHILS NFR BLD AUTO: 0.8 % (ref 0–1.5)
BILIRUB SERPL-MCNC: 0.5 MG/DL (ref 0–1.2)
BUN SERPL-MCNC: 9 MG/DL (ref 6–20)
BUN/CREAT SERPL: 10.2 (ref 7–25)
CALCIUM SPEC-SCNC: 9.4 MG/DL (ref 8.6–10.5)
CHLORIDE SERPL-SCNC: 106 MMOL/L (ref 98–107)
CHROMATIN AB SERPL-ACNC: <10 IU/ML (ref 0–14)
CK SERPL-CCNC: 43 U/L (ref 20–180)
CO2 SERPL-SCNC: 21.9 MMOL/L (ref 22–29)
CREAT SERPL-MCNC: 0.88 MG/DL (ref 0.57–1)
CRP SERPL-MCNC: 2.37 MG/DL (ref 0–0.5)
DEPRECATED RDW RBC AUTO: 40.5 FL (ref 37–54)
EGFRCR SERPLBLD CKD-EPI 2021: 89.7 ML/MIN/1.73
EOSINOPHIL # BLD AUTO: 0.31 10*3/MM3 (ref 0–0.4)
EOSINOPHIL NFR BLD AUTO: 3.4 % (ref 0.3–6.2)
ERYTHROCYTE [DISTWIDTH] IN BLOOD BY AUTOMATED COUNT: 12.9 % (ref 12.3–15.4)
ERYTHROCYTE [SEDIMENTATION RATE] IN BLOOD: 49 MM/HR (ref 0–20)
GLOBULIN UR ELPH-MCNC: 3 GM/DL
GLUCOSE SERPL-MCNC: 94 MG/DL (ref 65–99)
HAV IGM SERPL QL IA: NORMAL
HBV CORE IGM SERPL QL IA: NORMAL
HBV SURFACE AG SERPL QL IA: NORMAL
HCT VFR BLD AUTO: 36.6 % (ref 34–46.6)
HCV AB SER QL: NORMAL
HGB BLD-MCNC: 11.7 G/DL (ref 12–15.9)
IMM GRANULOCYTES # BLD AUTO: 0.03 10*3/MM3 (ref 0–0.05)
IMM GRANULOCYTES NFR BLD AUTO: 0.3 % (ref 0–0.5)
LYMPHOCYTES # BLD AUTO: 1.9 10*3/MM3 (ref 0.7–3.1)
LYMPHOCYTES NFR BLD AUTO: 20.9 % (ref 19.6–45.3)
MCH RBC QN AUTO: 27.9 PG (ref 26.6–33)
MCHC RBC AUTO-ENTMCNC: 32 G/DL (ref 31.5–35.7)
MCV RBC AUTO: 87.1 FL (ref 79–97)
MONOCYTES # BLD AUTO: 0.54 10*3/MM3 (ref 0.1–0.9)
MONOCYTES NFR BLD AUTO: 5.9 % (ref 5–12)
NEUTROPHILS NFR BLD AUTO: 6.26 10*3/MM3 (ref 1.7–7)
NEUTROPHILS NFR BLD AUTO: 68.7 % (ref 42.7–76)
NRBC BLD AUTO-RTO: 0 /100 WBC (ref 0–0.2)
PLATELET # BLD AUTO: 259 10*3/MM3 (ref 140–450)
PMV BLD AUTO: 9.7 FL (ref 6–12)
POTASSIUM SERPL-SCNC: 3.7 MMOL/L (ref 3.5–5.2)
PROT SERPL-MCNC: 7.1 G/DL (ref 6–8.5)
RBC # BLD AUTO: 4.2 10*6/MM3 (ref 3.77–5.28)
SODIUM SERPL-SCNC: 136 MMOL/L (ref 136–145)
TSH SERPL DL<=0.05 MIU/L-ACNC: 1.41 UIU/ML (ref 0.27–4.2)
WBC NRBC COR # BLD AUTO: 9.11 10*3/MM3 (ref 3.4–10.8)

## 2024-10-04 LAB — CCP IGA+IGG SERPL IA-ACNC: 13 UNITS (ref 0–19)

## 2024-10-05 LAB
ANA SER QL IF: NEGATIVE
LABORATORY COMMENT REPORT: NORMAL

## 2024-10-07 LAB
GAMMA INTERFERON BACKGROUND BLD IA-ACNC: 0.05 IU/ML
M TB IFN-G BLD-IMP: NEGATIVE
M TB IFN-G CD4+ BCKGRND COR BLD-ACNC: 0.06 IU/ML
M TB IFN-G CD4+CD8+ BCKGRND COR BLD-ACNC: 0.05 IU/ML
MITOGEN IGNF BCKGRD COR BLD-ACNC: 9.8 IU/ML
QUANTIFERON INCUBATION: NORMAL
SERVICE CMNT-IMP: NORMAL

## 2024-10-08 ENCOUNTER — SPECIALTY PHARMACY (OUTPATIENT)
Dept: GENERAL RADIOLOGY | Facility: HOSPITAL | Age: 34
End: 2024-10-08
Payer: MEDICAID

## 2024-10-08 DIAGNOSIS — M05.79 RHEUMATOID ARTHRITIS INVOLVING MULTIPLE SITES WITH POSITIVE RHEUMATOID FACTOR: ICD-10-CM

## 2024-10-08 RX ORDER — ETANERCEPT 50 MG/ML
50 SOLUTION SUBCUTANEOUS WEEKLY
Qty: 4 ML | Refills: 4 | Status: SHIPPED | OUTPATIENT
Start: 2024-10-08

## 2024-10-11 LAB — HLA-B27 QL NAA+PROBE: NEGATIVE

## 2024-10-16 LAB — 14-3-3 ETA AG SER IA-MCNC: 3.87 NG/ML

## 2024-10-17 ENCOUNTER — TELEPHONE (OUTPATIENT)
Age: 34
End: 2024-10-17
Payer: MEDICAID

## 2024-11-01 DIAGNOSIS — M05.79 RHEUMATOID ARTHRITIS INVOLVING MULTIPLE SITES WITH POSITIVE RHEUMATOID FACTOR: ICD-10-CM

## 2024-11-01 DIAGNOSIS — G56.41 COMPLEX REGIONAL PAIN SYNDROME TYPE 2 OF RIGHT UPPER EXTREMITY: ICD-10-CM

## 2024-11-02 ENCOUNTER — HOSPITAL ENCOUNTER (EMERGENCY)
Facility: HOSPITAL | Age: 34
Discharge: HOME | End: 2024-11-02
Attending: EMERGENCY MEDICINE
Payer: MEDICAID

## 2024-11-02 ENCOUNTER — APPOINTMENT (OUTPATIENT)
Dept: CT IMAGING | Facility: HOSPITAL | Age: 34
End: 2024-11-02
Payer: MEDICAID

## 2024-11-02 ENCOUNTER — APPOINTMENT (OUTPATIENT)
Dept: ULTRASOUND IMAGING | Facility: HOSPITAL | Age: 34
End: 2024-11-02
Payer: MEDICAID

## 2024-11-02 VITALS
OXYGEN SATURATION: 99 % | HEIGHT: 66 IN | BODY MASS INDEX: 38.57 KG/M2 | DIASTOLIC BLOOD PRESSURE: 87 MMHG | SYSTOLIC BLOOD PRESSURE: 138 MMHG | TEMPERATURE: 97.4 F | WEIGHT: 240 LBS | RESPIRATION RATE: 16 BRPM | HEART RATE: 117 BPM

## 2024-11-02 DIAGNOSIS — N30.01 ACUTE CYSTITIS WITH HEMATURIA: Primary | ICD-10-CM

## 2024-11-02 DIAGNOSIS — R10.9 FLANK PAIN: ICD-10-CM

## 2024-11-02 LAB
ALBUMIN SERPL-MCNC: 4.3 G/DL (ref 3.5–5.2)
ALBUMIN/GLOB SERPL: 1.4 G/DL
ALP SERPL-CCNC: 95 U/L (ref 39–117)
ALT SERPL W P-5'-P-CCNC: 13 U/L (ref 1–33)
ANION GAP SERPL CALCULATED.3IONS-SCNC: 12.5 MMOL/L (ref 5–15)
AST SERPL-CCNC: 20 U/L (ref 1–32)
B-HCG UR QL: NEGATIVE
BACTERIA UR QL AUTO: ABNORMAL /HPF
BASOPHILS # BLD AUTO: 0.08 10*3/MM3 (ref 0–0.2)
BASOPHILS NFR BLD AUTO: 1 % (ref 0–1.5)
BILIRUB SERPL-MCNC: 0.5 MG/DL (ref 0–1.2)
BILIRUB UR QL STRIP: NEGATIVE
BUN SERPL-MCNC: 8 MG/DL (ref 6–20)
BUN/CREAT SERPL: 9.8 (ref 7–25)
CALCIUM SPEC-SCNC: 9.4 MG/DL (ref 8.6–10.5)
CHLORIDE SERPL-SCNC: 104 MMOL/L (ref 98–107)
CLARITY UR: ABNORMAL
CO2 SERPL-SCNC: 21.5 MMOL/L (ref 22–29)
COLOR UR: YELLOW
CREAT SERPL-MCNC: 0.82 MG/DL (ref 0.57–1)
DEPRECATED RDW RBC AUTO: 42.9 FL (ref 37–54)
EGFRCR SERPLBLD CKD-EPI 2021: 97.6 ML/MIN/1.73
EOSINOPHIL # BLD AUTO: 0.38 10*3/MM3 (ref 0–0.4)
EOSINOPHIL NFR BLD AUTO: 4.8 % (ref 0.3–6.2)
ERYTHROCYTE [DISTWIDTH] IN BLOOD BY AUTOMATED COUNT: 13.5 % (ref 12.3–15.4)
GLOBULIN UR ELPH-MCNC: 3.1 GM/DL
GLUCOSE SERPL-MCNC: 96 MG/DL (ref 65–99)
GLUCOSE UR STRIP-MCNC: NEGATIVE MG/DL
HCT VFR BLD AUTO: 39.3 % (ref 34–46.6)
HGB BLD-MCNC: 12.8 G/DL (ref 12–15.9)
HGB UR QL STRIP.AUTO: ABNORMAL
HYALINE CASTS UR QL AUTO: ABNORMAL /LPF
IMM GRANULOCYTES # BLD AUTO: 0.02 10*3/MM3 (ref 0–0.05)
IMM GRANULOCYTES NFR BLD AUTO: 0.3 % (ref 0–0.5)
KETONES UR QL STRIP: NEGATIVE
LEUKOCYTE ESTERASE UR QL STRIP.AUTO: ABNORMAL
LIPASE SERPL-CCNC: 15 U/L (ref 13–60)
LYMPHOCYTES # BLD AUTO: 1.95 10*3/MM3 (ref 0.7–3.1)
LYMPHOCYTES NFR BLD AUTO: 24.7 % (ref 19.6–45.3)
MCH RBC QN AUTO: 28.4 PG (ref 26.6–33)
MCHC RBC AUTO-ENTMCNC: 32.6 G/DL (ref 31.5–35.7)
MCV RBC AUTO: 87.1 FL (ref 79–97)
MONOCYTES # BLD AUTO: 0.65 10*3/MM3 (ref 0.1–0.9)
MONOCYTES NFR BLD AUTO: 8.2 % (ref 5–12)
NEUTROPHILS NFR BLD AUTO: 4.8 10*3/MM3 (ref 1.7–7)
NEUTROPHILS NFR BLD AUTO: 61 % (ref 42.7–76)
NITRITE UR QL STRIP: NEGATIVE
NRBC BLD AUTO-RTO: 0 /100 WBC (ref 0–0.2)
PH UR STRIP.AUTO: 6 [PH] (ref 5–8)
PLATELET # BLD AUTO: 218 10*3/MM3 (ref 140–450)
PMV BLD AUTO: 9.9 FL (ref 6–12)
POTASSIUM SERPL-SCNC: 3.9 MMOL/L (ref 3.5–5.2)
PROT SERPL-MCNC: 7.4 G/DL (ref 6–8.5)
PROT UR QL STRIP: ABNORMAL
RBC # BLD AUTO: 4.51 10*6/MM3 (ref 3.77–5.28)
RBC # UR STRIP: ABNORMAL /HPF
REF LAB TEST METHOD: ABNORMAL
SODIUM SERPL-SCNC: 138 MMOL/L (ref 136–145)
SP GR UR STRIP: 1.01 (ref 1–1.03)
SQUAMOUS #/AREA URNS HPF: ABNORMAL /HPF
UROBILINOGEN UR QL STRIP: ABNORMAL
WBC # UR STRIP: ABNORMAL /HPF
WBC NRBC COR # BLD AUTO: 7.88 10*3/MM3 (ref 3.4–10.8)

## 2024-11-02 PROCEDURE — 99285 EMERGENCY DEPT VISIT HI MDM: CPT

## 2024-11-02 PROCEDURE — 76705 ECHO EXAM OF ABDOMEN: CPT

## 2024-11-02 PROCEDURE — 87086 URINE CULTURE/COLONY COUNT: CPT | Performed by: EMERGENCY MEDICINE

## 2024-11-02 PROCEDURE — 80053 COMPREHEN METABOLIC PANEL: CPT | Performed by: EMERGENCY MEDICINE

## 2024-11-02 PROCEDURE — 96374 THER/PROPH/DIAG INJ IV PUSH: CPT

## 2024-11-02 PROCEDURE — 81025 URINE PREGNANCY TEST: CPT | Performed by: EMERGENCY MEDICINE

## 2024-11-02 PROCEDURE — 25010000002 CEFTRIAXONE PER 250 MG: Performed by: EMERGENCY MEDICINE

## 2024-11-02 PROCEDURE — 85025 COMPLETE CBC W/AUTO DIFF WBC: CPT | Performed by: EMERGENCY MEDICINE

## 2024-11-02 PROCEDURE — 83690 ASSAY OF LIPASE: CPT | Performed by: EMERGENCY MEDICINE

## 2024-11-02 PROCEDURE — 36415 COLL VENOUS BLD VENIPUNCTURE: CPT

## 2024-11-02 PROCEDURE — 81001 URINALYSIS AUTO W/SCOPE: CPT | Performed by: EMERGENCY MEDICINE

## 2024-11-02 PROCEDURE — 25010000002 KETOROLAC TROMETHAMINE PER 15 MG: Performed by: EMERGENCY MEDICINE

## 2024-11-02 PROCEDURE — 96375 TX/PRO/DX INJ NEW DRUG ADDON: CPT

## 2024-11-02 PROCEDURE — 74177 CT ABD & PELVIS W/CONTRAST: CPT

## 2024-11-02 PROCEDURE — 25010000002 ONDANSETRON PER 1 MG: Performed by: EMERGENCY MEDICINE

## 2024-11-02 PROCEDURE — 25510000001 IOPAMIDOL 61 % SOLUTION: Performed by: EMERGENCY MEDICINE

## 2024-11-02 RX ORDER — CEPHALEXIN 500 MG/1
500 CAPSULE ORAL 4 TIMES DAILY
Qty: 28 CAPSULE | Refills: 0 | Status: SHIPPED | OUTPATIENT
Start: 2024-11-02 | End: 2024-11-09

## 2024-11-02 RX ORDER — FAMOTIDINE 10 MG/ML
20 INJECTION, SOLUTION INTRAVENOUS ONCE
Status: COMPLETED | OUTPATIENT
Start: 2024-11-02 | End: 2024-11-02

## 2024-11-02 RX ORDER — ONDANSETRON 2 MG/ML
4 INJECTION INTRAMUSCULAR; INTRAVENOUS ONCE
Status: COMPLETED | OUTPATIENT
Start: 2024-11-02 | End: 2024-11-02

## 2024-11-02 RX ORDER — KETOROLAC TROMETHAMINE 30 MG/ML
15 INJECTION, SOLUTION INTRAMUSCULAR; INTRAVENOUS ONCE
Status: COMPLETED | OUTPATIENT
Start: 2024-11-02 | End: 2024-11-02

## 2024-11-02 RX ORDER — SODIUM CHLORIDE 0.9 % (FLUSH) 0.9 %
10 SYRINGE (ML) INJECTION AS NEEDED
Status: DISCONTINUED | OUTPATIENT
Start: 2024-11-02 | End: 2024-11-02 | Stop reason: HOSPADM

## 2024-11-02 RX ORDER — IOPAMIDOL 612 MG/ML
100 INJECTION, SOLUTION INTRAVASCULAR
Status: COMPLETED | OUTPATIENT
Start: 2024-11-02 | End: 2024-11-02

## 2024-11-02 RX ADMIN — FAMOTIDINE 20 MG: 10 INJECTION, SOLUTION INTRAVENOUS at 14:20

## 2024-11-02 RX ADMIN — IOPAMIDOL 100 ML: 612 INJECTION, SOLUTION INTRAVENOUS at 15:44

## 2024-11-02 RX ADMIN — KETOROLAC TROMETHAMINE 15 MG: 30 INJECTION, SOLUTION INTRAMUSCULAR; INTRAVENOUS at 14:52

## 2024-11-02 RX ADMIN — ONDANSETRON 4 MG: 2 INJECTION INTRAMUSCULAR; INTRAVENOUS at 14:17

## 2024-11-02 RX ADMIN — SODIUM CHLORIDE 2000 MG: 9 INJECTION, SOLUTION INTRAVENOUS at 14:53

## 2024-11-02 NOTE — ED PROVIDER NOTES
EMERGENCY DEPARTMENT ENCOUNTER    Pt Name: Becky Garcia  MRN: [id number]  Pt :   1990  Room Number:    Date of encounter:  2024  PCP: Reuben Mercado DO  ED Provider: Francisco Parrish MD    Historian: Patient      HPI:  Chief Complaint   Patient presents with    Abdominal Pain     Pt CO RUQ pain since yesterday. Pt also reports nausea since Wednesday.           Context: Becky Garcia is a 33 y.o. female who presents to the ED c/o abdominal pain, located in the right upper quadrant, started last night, associate with nausea and vomiting, nonradiating.  Has never had this pain before.  Denies abdominal surgeries in the past.      PAST MEDICAL HISTORY  Past Medical History:   Diagnosis Date    Arthritis     Depression     Hx of migraine headaches     Kidney stones     Rheumatoid arthritis          PAST SURGICAL HISTORY  Past Surgical History:   Procedure Laterality Date    EYE PTOSIS REPAIR Right     EYE SURGERY      upper eyelid     EYE SURGERY  2015    upper eyelid         FAMILY HISTORY  Family History   Problem Relation Age of Onset    Hypertension Mother     Arthritis Mother     Hypertension Father     Arrhythmia Father     Cancer Maternal Grandmother     Arthritis Paternal Grandmother     Cancer Paternal Grandfather          SOCIAL HISTORY  Social History     Socioeconomic History    Marital status: Single   Tobacco Use    Smoking status: Never     Passive exposure: Never    Smokeless tobacco: Never   Vaping Use    Vaping status: Never Used   Substance and Sexual Activity    Alcohol use: No    Drug use: No    Sexual activity: Defer         ALLERGIES  Zithromax [azithromycin] and Mucinex dm  [dextromethorphan-guaifenesin]        REVIEW OF SYSTEMS  Review of Systems   Constitutional:  Negative for chills and fever.   HENT:  Negative for sore throat and trouble swallowing.    Eyes:  Negative for pain and redness.   Respiratory:  Negative for cough and shortness of  breath.    Cardiovascular:  Negative for chest pain and leg swelling.   Gastrointestinal:  Positive for abdominal pain, nausea and vomiting.   Genitourinary:  Negative for dysuria and urgency.   Musculoskeletal:  Negative for back pain and neck pain.   Skin:  Negative for rash and wound.   Neurological:  Negative for dizziness and weakness.        All systems reviewed and negative except for those discussed in HPI.       PHYSICAL EXAM    I have reviewed the triage vital signs and nursing notes.    ED Triage Vitals [11/02/24 1253]   Temp Heart Rate Resp BP SpO2   97.4 °F (36.3 °C) 117 16 (!) 139/104 95 %      Temp src Heart Rate Source Patient Position BP Location FiO2 (%)   -- -- -- -- --       Physical Exam  Constitutional:       Appearance: Normal appearance. She is not ill-appearing.   HENT:      Head: Normocephalic and atraumatic.      Right Ear: External ear normal.      Left Ear: External ear normal.      Nose: Nose normal.      Mouth/Throat:      Mouth: Mucous membranes are moist.      Pharynx: Oropharynx is clear.   Eyes:      Extraocular Movements: Extraocular movements intact.      Conjunctiva/sclera: Conjunctivae normal.      Pupils: Pupils are equal, round, and reactive to light.   Cardiovascular:      Rate and Rhythm: Normal rate and regular rhythm.      Pulses:           Radial pulses are 2+ on the right side and 2+ on the left side.   Pulmonary:      Effort: Pulmonary effort is normal.      Breath sounds: Normal breath sounds.   Abdominal:      General: There is no distension.      Palpations: Abdomen is soft.      Tenderness: There is abdominal tenderness in the right upper quadrant. There is guarding.   Musculoskeletal:         General: No tenderness or deformity. Normal range of motion.      Cervical back: Normal range of motion and neck supple.      Right lower leg: No edema.      Left lower leg: No edema.   Skin:     General: Skin is warm and dry.      Capillary Refill: Capillary refill takes  less than 2 seconds.   Neurological:      General: No focal deficit present.      Mental Status: She is alert and oriented to person, place, and time.            LAB RESULTS  Recent Results (from the past 24 hours)   Comprehensive Metabolic Panel    Collection Time: 11/02/24  1:19 PM    Specimen: Blood   Result Value Ref Range    Glucose 96 65 - 99 mg/dL    BUN 8 6 - 20 mg/dL    Creatinine 0.82 0.57 - 1.00 mg/dL    Sodium 138 136 - 145 mmol/L    Potassium 3.9 3.5 - 5.2 mmol/L    Chloride 104 98 - 107 mmol/L    CO2 21.5 (L) 22.0 - 29.0 mmol/L    Calcium 9.4 8.6 - 10.5 mg/dL    Total Protein 7.4 6.0 - 8.5 g/dL    Albumin 4.3 3.5 - 5.2 g/dL    ALT (SGPT) 13 1 - 33 U/L    AST (SGOT) 20 1 - 32 U/L    Alkaline Phosphatase 95 39 - 117 U/L    Total Bilirubin 0.5 0.0 - 1.2 mg/dL    Globulin 3.1 gm/dL    A/G Ratio 1.4 g/dL    BUN/Creatinine Ratio 9.8 7.0 - 25.0    Anion Gap 12.5 5.0 - 15.0 mmol/L    eGFR 97.6 >60.0 mL/min/1.73   Lipase    Collection Time: 11/02/24  1:19 PM    Specimen: Blood   Result Value Ref Range    Lipase 15 13 - 60 U/L   CBC Auto Differential    Collection Time: 11/02/24  1:19 PM    Specimen: Blood   Result Value Ref Range    WBC 7.88 3.40 - 10.80 10*3/mm3    RBC 4.51 3.77 - 5.28 10*6/mm3    Hemoglobin 12.8 12.0 - 15.9 g/dL    Hematocrit 39.3 34.0 - 46.6 %    MCV 87.1 79.0 - 97.0 fL    MCH 28.4 26.6 - 33.0 pg    MCHC 32.6 31.5 - 35.7 g/dL    RDW 13.5 12.3 - 15.4 %    RDW-SD 42.9 37.0 - 54.0 fl    MPV 9.9 6.0 - 12.0 fL    Platelets 218 140 - 450 10*3/mm3    Neutrophil % 61.0 42.7 - 76.0 %    Lymphocyte % 24.7 19.6 - 45.3 %    Monocyte % 8.2 5.0 - 12.0 %    Eosinophil % 4.8 0.3 - 6.2 %    Basophil % 1.0 0.0 - 1.5 %    Immature Grans % 0.3 0.0 - 0.5 %    Neutrophils, Absolute 4.80 1.70 - 7.00 10*3/mm3    Lymphocytes, Absolute 1.95 0.70 - 3.10 10*3/mm3    Monocytes, Absolute 0.65 0.10 - 0.90 10*3/mm3    Eosinophils, Absolute 0.38 0.00 - 0.40 10*3/mm3    Basophils, Absolute 0.08 0.00 - 0.20 10*3/mm3     Immature Grans, Absolute 0.02 0.00 - 0.05 10*3/mm3    nRBC 0.0 0.0 - 0.2 /100 WBC   Pregnancy, Urine - Urine, Clean Catch    Collection Time: 11/02/24  1:35 PM    Specimen: Urine, Clean Catch   Result Value Ref Range    HCG, Urine QL Negative Negative   Urinalysis With Microscopic If Indicated (No Culture) - Urine, Clean Catch    Collection Time: 11/02/24  1:35 PM    Specimen: Urine, Clean Catch   Result Value Ref Range    Color, UA Yellow Yellow, Straw    Appearance, UA Cloudy (A) Clear    pH, UA 6.0 5.0 - 8.0    Specific Gravity, UA 1.008 1.005 - 1.030    Glucose, UA Negative Negative    Ketones, UA Negative Negative    Bilirubin, UA Negative Negative    Blood, UA Small (1+) (A) Negative    Protein, UA 30 mg/dL (1+) (A) Negative    Leuk Esterase, UA Large (3+) (A) Negative    Nitrite, UA Negative Negative    Urobilinogen, UA 0.2 E.U./dL 0.2 - 1.0 E.U./dL   Urinalysis, Microscopic Only - Urine, Clean Catch    Collection Time: 11/02/24  1:35 PM    Specimen: Urine, Clean Catch   Result Value Ref Range    RBC, UA 3-5 (A) None Seen, 0-2 /HPF    WBC, UA 21-50 (A) None Seen, 0-2 /HPF    Bacteria, UA 2+ (A) None Seen /HPF    Squamous Epithelial Cells, UA 7-12 (A) None Seen, 0-2 /HPF    Hyaline Casts, UA None Seen None Seen /LPF    Methodology Manual Light Microscopy        If labs were ordered, I independently reviewed the results and considered them in treating the patient.        RADIOLOGY  CT Abdomen Pelvis With Contrast    Result Date: 11/2/2024  PROCEDURE: CT ABDOMEN PELVIS W CONTRAST-  HISTORY: Right flank pain, RUQ pain, UTI  COMPARISON: December 23, 2021.  PROCEDURE: The patient was injected with IV contrast.  Axial images were obtained from the lung bases to the pubic symphysis by computed tomography.  FINDINGS:  ABDOMEN: Lung bases are clear. The heart is normal size. Gallbladder is present. The liver, spleen, pancreas and adrenal glands are unremarkable. There is no hydronephrosis. No abnormally dilated loops  of bowel. Moderate retained stool, correlate for constipation.  PELVIS: The appendix is not identified. Bladder is decompressed. The uterus is present. There are follicular changes to the ovaries, small mount of free fluid in the pelvis which may be physiologic. Asymmetric cystic lesion along the left aspect of the vagina may represent a Bartholin gland cyst measuring up to 3.3 cm. Consider pelvic ultrasound if clinically warranted. No acute osseous changes.      Moderate retained stool, correlate for constipation.   There are follicular changes to the ovaries, small mount of free fluid in the pelvis which may be physiologic. Asymmetric cystic lesion along the left aspect of the vagina may represent a Bartholin gland cyst measuring up to 3.3 cm. Consider pelvic ultrasound if clinically warranted.    This study was performed with techniques to keep radiation doses as low as reasonably achievable (ALARA). Individualized dose reduction techniques using automated exposure control or adjustment of mA and/or kV according to the patient size were employed.   CTDI: 19.85 mGy DLP:1018.73 mGy.cm   This report was signed and finalized on 11/2/2024 4:09 PM by Adriano Rincon DO.      US Gallbladder    Result Date: 11/2/2024  PROCEDURE: US GALLBLADDER-  HISTORY: RUQ pain  PROCEDURE: Ultrasound images of the right upper quadrant were obtained.  COMPARISON: February 2, 2018  FINDINGS: Limited images of the pancreas are unremarkable. The liver parenchyma is normal in echogenicity. The gallbladder is normal with no shadowing stones or wall thickening. Questionable gallbladder sludge. There is no pericholecystic fluid.  The common duct measures 4.2 mm . Limited images of the right kidney are unremarkable.      Question biliary sludge, consider nuclear HIDA scan if clinically warranted.    This report was signed and finalized on 11/2/2024 2:42 PM by Adriano Rincon DO.           PROCEDURES    Procedures    Interpretations    O2 Sat: The  patients oxygen saturation was 95% on Room Air.  This was independently interpreted by me as Normal      Radiology: I ordered and independently reviewed the above noted radiographic studies.  I viewed images of US Abdomen which showed No intraabdominal process per my independent interpretation. See radiologist's dictation for official interpretation.     Radiology: I ordered and independently reviewed the above noted radiographic studies.  I viewed images of CT Abdomen/Pelvis which showed No intraabdominal process per my independent interpretation. See radiologist's dictation for official interpretation.      MEDICATIONS GIVEN IN ER    Medications   sodium chloride 0.9 % flush 10 mL (has no administration in time range)   ondansetron (ZOFRAN) injection 4 mg (4 mg Intravenous Given 11/2/24 1417)   famotidine (PEPCID) injection 20 mg (20 mg Intravenous Given 11/2/24 1420)   cefTRIAXone (ROCEPHIN) 2,000 mg in sodium chloride 0.9 % 100 mL IVPB-VTB (0 mg Intravenous Stopped 11/2/24 1502)   ketorolac (TORADOL) injection 15 mg (15 mg Intravenous Given 11/2/24 1452)   iopamidol (ISOVUE-300) 61 % injection 100 mL (100 mL Intravenous Given 11/2/24 1544)         MEDICAL DECISION MAKING, PROGRESS, and CONSULTS    All labs, if obtained, have been independently reviewed by me.  All radiology studies, if obtained, have been reviewed by me and the radiologist dictating the report.  All EKG's, if obtained, have been independently viewed and interpreted by me      Discussion below represents my analysis of pertinent findings related to patient's condition, differential diagnosis, treatment plan and final disposition.      Differential diagnosis:    33-year-old female presenting to the ED with complaint of abdominal pain, she is quite tender in the right upper quadrant, concerning for cholelithiasis, versus cholecystitis, possible pancreatitis.  Will obtain laboratory workup including lipase, liver function test, pregnancy test.   Offered patient IV pain medication and she declined, will give Zofran, Pepcid    Additional Sources:  External (non-ED) record review:  Rheumatology note 10/2/2024 regarding rheumatoid arthritis       Orders placed during this visit:  Orders Placed This Encounter   Procedures    Urine Culture - Urine,    US Gallbladder    CT Abdomen Pelvis With Contrast    Comprehensive Metabolic Panel    Pregnancy, Urine - Urine, Clean Catch    Urinalysis With Microscopic If Indicated (No Culture) - Urine, Clean Catch    Lipase    CBC Auto Differential    Urinalysis, Microscopic Only - Urine, Clean Catch    Insert Peripheral IV    CBC & Differential         Additional orders considered but not ordered:  None    ED Course:    Consultants:  None    ED Course as of 11/02/24 1615   Sat Nov 02, 2024   1343 CBC & Differential  CBC is unremarkable [CS]   1343 Lipase  Lipase is negative [CS]   1344 Comprehensive Metabolic Panel(!)  Chemistries with normal liver function tests [CS]   1346 Pregnancy, Urine - Urine, Clean Catch  Pregnancy test negative, ruling out ectopic pregnancy [CS]   1418 Urinalysis, Microscopic Only - Urine, Clean Catch(!)  Urinalysis is infected, there is also blood involved, will treat with antibiotics, if ultrasound negative will investigate with CT for kidney stone [CS]   1438 Ultrasound is fairly benign, patient's pain persist and her urine appears to be infected with possible blood as well, given that concern for pyelonephritis versus a infected kidney stone.  Will provide Toradol patient now agrees to pain medication, and obtain a CT scan of the abdomen pelvis. [CS]   1613 CT scan is benign, patient's pain is currently well-controlled, will treat UTI, advised close follow-up with her primary care physician, urine cultures pending.  The patient voiced understanding is agreeable to plan for discharge home [CS]      ED Course User Index  [CS] Francisco Parrish MD           After my consideration of clinical  presentation and any laboratory/radiology studies obtained, I discussed the findings with the patient/patient representative who is in agreement with the treatment plan and the final disposition. Risks and benefits of discharge were discussed.     AS OF 16:15 EDT VITALS:    BP - 138/87  HR - 117  TEMP - 97.4 °F (36.3 °C)  O2 SATS - 99%    I reviewed the patients prescription monitoring report if available prior to discharge    DIAGNOSIS  Final diagnoses:   Acute cystitis with hematuria   Flank pain         DISPOSITION  ED Disposition       ED Disposition   Discharge    Condition   Stable    Comment   --                   Please note that portions of this document were completed with voice recognition software.        Francisco Parrish MD  11/02/24 [id number]

## 2024-11-03 LAB — BACTERIA SPEC AEROBE CULT: NORMAL

## 2024-11-04 NOTE — TELEPHONE ENCOUNTER
Rx Refill Note  Requested Prescriptions     Pending Prescriptions Disp Refills    gabapentin (NEURONTIN) 800 MG tablet 60 tablet 3     Sig: Take 1 tablet by mouth 2 (Two) Times a Day.      Last office visit with prescribing clinician: 8/20/2024   Last telemedicine visit with prescribing clinician: Visit date not found   Next office visit with prescribing clinician: 12/17/2024                         Would you like a call back once the refill request has been completed: [] Yes [] No    If the office needs to give you a call back, can they leave a voicemail: [] Yes [] No    Wendie Garces CMA  11/04/24, 08:35 EST

## 2024-11-05 ENCOUNTER — SPECIALTY PHARMACY (OUTPATIENT)
Dept: GENERAL RADIOLOGY | Facility: HOSPITAL | Age: 34
End: 2024-11-05
Payer: MEDICAID

## 2024-11-05 RX ORDER — GABAPENTIN 800 MG/1
800 TABLET ORAL 2 TIMES DAILY
Qty: 60 TABLET | Refills: 3 | Status: SHIPPED | OUTPATIENT
Start: 2024-11-05

## 2024-11-05 NOTE — PROGRESS NOTES
Specialty Pharmacy Patient Management Program  Rheumatology Initial Assessment     Becky Garcia was referred by an Rheumatology provider to the Rheumatology Patient Management program offered by [facility] for Rheumatoid Arthritis on 11/05/24.  An initial outreach was conducted, including assessment of therapy appropriateness and specialty medication education for Enbrel. The patient was introduced to services offered by [facility], including: regular assessments, refill coordination, curbside pick-up or mail order delivery options, prior authorization maintenance, and financial assistance programs as applicable. The patient was also provided with contact information for the pharmacy team.     Insurance Coverage & Financial Support  [place] MEDICAID     Relevant Past Medical History and Comorbidities  Relevant medical history and concomitant health conditions were discussed with the patient. The patient's chart has been reviewed for relevant past medical history and comorbid conditions and updated as necessary.  Past Medical History:   Diagnosis Date    Arthritis     Depression     Hx of migraine headaches     Kidney stones     Rheumatoid arthritis      Social History     Socioeconomic History    Marital status: Single   Tobacco Use    Smoking status: Never     Passive exposure: Never    Smokeless tobacco: Never   Vaping Use    Vaping status: Never Used   Substance and Sexual Activity    Alcohol use: No    Drug use: No    Sexual activity: Defer       Problem list reviewed by Fadia Brantley, Sami on 11/5/2024 at 12:41 PM    Allergies  Known allergies and reactions were discussed with the patient. The patient's chart has been reviewed for  allergy information and updated as necessary.   Allergies   Allergen Reactions    Zithromax [Azithromycin] Anaphylaxis    Mucinex Dm  [Dextromethorphan-Guaifenesin] Hives       Allergies reviewed by Fadia Brantley, Sami on  "11/5/2024 at 12:39 PM    Relevant Laboratory Values  Relevant laboratory values were discussed with the patient. The following specialty medication dose adjustment(s) are recommended:   A1C Last 3 Results          3/20/2024    11:47   HGBA1C Last 3 Results   Hemoglobin A1C 5.1      Lab Results   Component Value Date    HGBA1C 5.1 03/20/2024     Lab Results   Component Value Date    GLUCOSE 96 11/02/2024    CALCIUM 9.4 11/02/2024     11/02/2024    K 3.9 11/02/2024    CO2 21.5 (L) 11/02/2024     11/02/2024    BUN 8 11/02/2024    CREATININE 0.82 11/02/2024    EGFRIFAFRI 121 01/29/2018    EGFRIFNONA 84 12/23/2021    BCR 9.8 11/02/2024    ANIONGAP 12.5 11/02/2024     No results found for: \"CHOL\", \"CHLPL\", \"TRIG\", \"HDL\", \"LDL\", \"LDLDIRECT\"      Current Medication List  This medication list has been reviewed with the patient and evaluated for any interactions or necessary modifications/recommendations, and updated to include all prescription medications, OTC medications, and supplements the patient is currently taking.  This list reflects what is contained in the patient's profile, which has also been marked as reviewed to communicate to other providers it is the most up to date version of the patient's current medication therapy.     Current Outpatient Medications:     acetaminophen (TYLENOL) 650 MG 8 hr tablet, Take 1 tablet by mouth., Disp: , Rfl:     amitriptyline (ELAVIL) 10 MG tablet, TAKE ONE TABLET BY MOUTH ONCE NIGHTLY, Disp: 90 tablet, Rfl: 0    cephalexin (KEFLEX) 500 MG capsule, Take 1 capsule by mouth 4 (Four) Times a Day for 7 days., Disp: 28 capsule, Rfl: 0    diclofenac (VOLTAREN) 25 MG EC tablet, Take 1 tablet by mouth 2 (Two) Times a Day., Disp: 180 tablet, Rfl: 2    DULoxetine (CYMBALTA) 60 MG capsule, TAKE ONE CAPSULE BY MOUTH DAILY, Disp: 90 capsule, Rfl: 0    Enbrel Mini 50 MG/ML solution cartridge, Inject 50 mg under the skin into the appropriate area as directed 1 (One) Time Per Week., " Disp: 4 mL, Rfl: 4    gabapentin (NEURONTIN) 800 MG tablet, Take 1 tablet by mouth 2 (Two) Times a Day., Disp: 60 tablet, Rfl: 3    hydroxychloroquine (PLAQUENIL) 200 MG tablet, Take 2 tablets by mouth Daily., Disp: 60 tablet, Rfl: 3    Melatonin 12 MG tablet, Take  by mouth., Disp: , Rfl:     multivitamin with minerals (MULTIVITAMIN ADULTS PO), Take 1 tablet by mouth Daily., Disp: , Rfl:     mupirocin (BACTROBAN) 2 % ointment, Apply to affected area bid-tid, Disp: 30 g, Rfl: 0    ondansetron ODT (ZOFRAN-ODT) 4 MG disintegrating tablet, Place 1 tablet on the tongue Every 8 (Eight) Hours As Needed for Nausea., Disp: 15 tablet, Rfl: 0    pantoprazole (PROTONIX) 20 MG EC tablet, TAKE 1 TABLET BY MOUTH DAILY, Disp: 90 tablet, Rfl: 1  No current facility-administered medications for this visit.    Medicines reviewed by Fadia Brantley, PharmD on 11/5/2024 at 12:41 PM    Drug Interactions  Currently no interactions    Initial Education Provided for Specialty Medication  The patient has been provided with the following education and any applicable administration techniques (i.e. self-injection) have been demonstrated for the therapies indicated. All questions and concerns have been addressed prior to the patient receiving the medication, and the patient has verbalized comprehension of the education and any materials provided. Additional patient education shall be provided and documented upon request by the patient, provider, or payer.          Enbrel (etanercept)           Medication Expectations   Why am I taking this medication? You are taking this medication for plaque psoriasis, psoriatic arthritis, or rheumatoid arthritis.   What should I expect while on this medication? You should expect a decrease in the frequency and severity of symptoms.   How does the medication work? Entanercept binds to tumor necrosis factor and blocks its interaction with cell surface receptors.   How long will I be on this medication for? The  amount of time you will be on this medication will be determined by your doctor and your response to the medication.    How do I take this medication? Take as directed on your prescription label.  This medication is a subcutaneous injection given in the fatty part of the skin on the top of the thigh, upper outer arm, or stomach area. May allow to reach room temperature for 15 - 30 minutes prior to injection.   What are some possible side effects? Injection site reactions and hypersensitivity reactions, skin rash, diarrhea, signs of a common cold, upper respiratory tract infection, or itching.   What happens if I miss a dose? If you miss a dose, take it as soon as you remember. If it is close to the time for your next dose, skip the missed dose and go back to your normal time.                    Medication Safety   What are things I should warn my doctor immediately about? Allergic reaction such has hives or trouble breathing. If you develop symptoms of infection such as a cough that does not go away, weight loss, night sweats, sking or mole changes, muscle pain or weakness, or severe dizziness.     What are things that I should be cautious of? Injection site reaction, skin rash, and diarrhea. You may have more chance of getting an infection.  Wash your hands often and stay away from people with infections, colds, or flu.   What are some medications that can interact with this one? Immunosuppressants and vaccines.            Medication Storage/Handling   How should I handle this medication? Keep this medication our of reach of pets/children in original container.  Store in the original container to protect from light. Do not freeze or shake. Do not inject where the skin is tender, bruised, red, hard, or affected by psoriasis.  Rotate injection sites.   How does this medication need to be stored? Store in refrigerator and keep dry. If needed, you may store at room temperature for up to 14 days.    How should I dispose  of this medication? You can dispose of the syringe in a sharps container, and if this is not available you may use an empty hard plastic container such as a milk jug or tide container. Discard any unused portion or if stored outside of refrigerator > 14 days.            Resources/Support   How can I remind myself to take this medication? You can download a reminder skyler on your phone or use a calandar  to help with your injection.   Is financial support available?  Yes, Enbrel Support can provide co-pay assisstance if you have commercial insurance or patient assistance if you have Medicare or no insurance.    Which vaccines are recommended for me? Talk to your doctor about these vaccines: Flu, Coronavirus (COVID-19), Pneumococcal (pneumonia), Tdap, Hepatitis B, Zoster (shingles).              Adherence and Self-Administration  Adherence related to the patient's specialty therapy was discussed with the patient. The Adherence segment of this outreach has been reviewed and updated.     Is there a concern with patient's ability to self administer the medication correctly and without issue?: No  Were any potential barriers to adherence identified during the initial assessment or patient education?: No  Are there any concerns regarding the patient's understanding of the importance of medication adherence?: No  Methods for Supporting Patient Adherence and/or Self-Administration: n/a    Open Medication Therapy Problems  No medication therapy recommendations to display    Goals of Therapy  Goals related to the patient's specialty therapy were discussed with the patient. The Patient Goals segment of this outreach has been reviewed and updated.   Goals Addressed Today    None       Reassessment Plan & Follow-Up  1. Medication Therapy Changes: Enbrel 50mg every 7 days  2. Related Plans, Therapy Recommendations, or Therapy Problems to Be Addressed: no questions or concerns at this time.  3. Pharmacist to perform regular  assessments no more than (6) months from the previous assessment.  4. Care Coordinator to set up future refill outreaches, coordinate prescription delivery, and escalate clinical questions to pharmacist.  5. Welcome information and patient satisfaction survey to be sent by specialty pharmacy team with patient's initial fill.    Attestation  Therapeutic appropriateness: Appropriate   I attest the patient was actively involved in and has agreed to the above plan of care. If the prescribed therapy is at any point deemed not appropriate based on the current or future assessments, a consultation will be initiated with the patient's specialty care provider to determine the best course of action. The revised plan of therapy will be documented along with any required assessments and/or additional patient education provided.     Fadia Brantley, Sami, BCPS  Clinical Specialty Pharmacist, Rheumatology   11/5/2024  12:45 EST

## 2024-11-07 ENCOUNTER — OFFICE VISIT (OUTPATIENT)
Dept: FAMILY MEDICINE CLINIC | Facility: CLINIC | Age: 34
End: 2024-11-07
Payer: MEDICAID

## 2024-11-07 VITALS
DIASTOLIC BLOOD PRESSURE: 74 MMHG | OXYGEN SATURATION: 98 % | SYSTOLIC BLOOD PRESSURE: 116 MMHG | RESPIRATION RATE: 16 BRPM | HEIGHT: 66 IN | HEART RATE: 96 BPM | TEMPERATURE: 97.8 F | WEIGHT: 238.6 LBS | BODY MASS INDEX: 38.35 KG/M2

## 2024-11-07 DIAGNOSIS — R10.11 RUQ PAIN: Primary | ICD-10-CM

## 2024-11-07 DIAGNOSIS — R11.0 NAUSEA: ICD-10-CM

## 2024-11-07 DIAGNOSIS — N75.0 BARTHOLIN'S GLAND CYST: ICD-10-CM

## 2024-11-07 DIAGNOSIS — K82.8 SLUDGE IN GALLBLADDER: ICD-10-CM

## 2024-11-07 RX ORDER — ONDANSETRON 4 MG/1
4 TABLET, ORALLY DISINTEGRATING ORAL EVERY 8 HOURS PRN
Qty: 20 TABLET | Refills: 0 | Status: SHIPPED | OUTPATIENT
Start: 2024-11-07

## 2024-11-07 NOTE — PROGRESS NOTES
"                      Established Patient        Chief Complaint:   Chief Complaint   Patient presents with    Cyst     Pt is here for a cyst on vagina.            History of Present Illness:    Becky Garcia is a 34 y.o. female who presents today for follow up of recent ED visit on 11/2 for RUQ pain. Patient was diagnosed with UTI. Prescribed keflex x 7 days. States that symptoms have persisted and does not feel much better.     Patient reports nausea x 1 week. Unable to tolerate food.     Subjective     The following portions of the patient's history were reviewed and updated as appropriate: allergies, current medications, past family history, past medical history, past social history, past surgical history and problem list.    ALLERGIES  Allergies   Allergen Reactions    Zithromax [Azithromycin] Anaphylaxis    Mucinex Dm  [Dextromethorphan-Guaifenesin] Hives       ROS  Review of Systems   Constitutional:  Positive for fatigue. Negative for fever.   Gastrointestinal:  Positive for abdominal pain and nausea. Negative for diarrhea and vomiting.   Genitourinary:  Negative for dysuria.         Objective     Vital Signs:   /74   Pulse 96   Temp 97.8 °F (36.6 °C) (Axillary)   Resp 16   Ht 167.6 cm (66\")   Wt 108 kg (238 lb 9.6 oz)   LMP 10/14/2024   SpO2 98%   BMI 38.51 kg/m²                Physical Exam   Physical Exam  Vitals and nursing note reviewed.   HENT:      Mouth/Throat:      Lips: Pink.   Eyes:      General: Lids are normal.   Cardiovascular:      Rate and Rhythm: Normal rate.   Pulmonary:      Effort: Pulmonary effort is normal.   Abdominal:      General: Bowel sounds are normal. There is no distension.      Palpations: Abdomen is soft.      Tenderness: There is abdominal tenderness in the right upper quadrant. There is no guarding or rebound. Negative signs include James's sign and Rovsing's sign.   Neurological:      Mental Status: She is alert and oriented to person, place, and " time.      Gait: Gait is intact.   Psychiatric:         Attention and Perception: Attention normal.         Mood and Affect: Mood and affect normal.         Speech: Speech normal.         Behavior: Behavior normal. Behavior is cooperative.     PROCEDURE: CT ABDOMEN PELVIS W CONTRAST-     HISTORY: Right flank pain, RUQ pain, UTI     COMPARISON: December 23, 2021.     PROCEDURE: The patient was injected with IV contrast.  Axial images were  obtained from the lung bases to the pubic symphysis by computed  tomography.     FINDINGS:     ABDOMEN: Lung bases are clear. The heart is normal size. Gallbladder is  present. The liver, spleen, pancreas and adrenal glands are  unremarkable. There is no hydronephrosis. No abnormally dilated loops of  bowel. Moderate retained stool, correlate for constipation.     PELVIS: The appendix is not identified. Bladder is decompressed. The  uterus is present. There are follicular changes to the ovaries, small  mount of free fluid in the pelvis which may be physiologic. Asymmetric  cystic lesion along the left aspect of the vagina may represent a  Bartholin gland cyst measuring up to 3.3 cm. Consider pelvic ultrasound  if clinically warranted. No acute osseous changes.     IMPRESSION:  Moderate retained stool, correlate for constipation.      There are follicular changes to the ovaries, small mount of free fluid  in the pelvis which may be physiologic. Asymmetric cystic lesion along  the left aspect of the vagina may represent a Bartholin gland cyst  measuring up to 3.3 cm. Consider pelvic ultrasound if clinically  warranted.        Assessment and Plan      Assessment/Plan:   Diagnoses and all orders for this visit:    1. RUQ pain (Primary)  -     Ambulatory Referral to General Surgery  -     NM HIDA SCAN WITH PHARMACOLOGICAL INTERVENTION; Future    2. Nausea  -     ondansetron ODT (ZOFRAN-ODT) 4 MG disintegrating tablet; Place 1 tablet on the tongue Every 8 (Eight) Hours As Needed for  Nausea.  Dispense: 20 tablet; Refill: 0    3. Sludge in gallbladder  -     Ambulatory Referral to General Surgery  -     NM HIDA SCAN WITH PHARMACOLOGICAL INTERVENTION; Future    4. Bartholin's gland cyst  -     Ambulatory Referral to Obstetrics / Gynecology    Risks, benefits, and potential side effects of current/new medications reviewed with patient.  Patient voiced understanding and wished to proceed with treatment.    I will contact patient regarding test results and provide instructions regarding any necessary changes in plan of care.    Referral for OB/Gyn for further evaluation of bartholin's gland cyst noted on CT abd.     Patient was encouraged to keep me informed of any acute changes, lack of improvement, or any new concerning symptoms.      Discussion Summary:  Discussed plan of care in detail with pt today; pt verb understanding and agrees.        I have reviewed and updated all copied forward information, as appropriate.  I attest to the accuracy and relevance of any unchanged information.      Follow up:  No follow-ups on file.     Patient Education:  There are no Patient Instructions on file for this visit.    Sole Renteria, BEATRIZ  11/22/24  09:24 EST          Please note that portions of this note may have been completed with a voice recognition program.

## 2024-11-08 ENCOUNTER — APPOINTMENT (OUTPATIENT)
Dept: ULTRASOUND IMAGING | Facility: HOSPITAL | Age: 34
End: 2024-11-08
Payer: MEDICAID

## 2024-11-08 ENCOUNTER — HOSPITAL ENCOUNTER (EMERGENCY)
Facility: HOSPITAL | Age: 34
Discharge: HOME | End: 2024-11-08
Attending: STUDENT IN AN ORGANIZED HEALTH CARE EDUCATION/TRAINING PROGRAM
Payer: MEDICAID

## 2024-11-08 VITALS
SYSTOLIC BLOOD PRESSURE: 133 MMHG | HEIGHT: 66 IN | RESPIRATION RATE: 14 BRPM | OXYGEN SATURATION: 100 % | TEMPERATURE: 97.8 F | HEART RATE: 81 BPM | WEIGHT: 235 LBS | BODY MASS INDEX: 37.77 KG/M2 | DIASTOLIC BLOOD PRESSURE: 73 MMHG

## 2024-11-08 DIAGNOSIS — R10.11 RIGHT UPPER QUADRANT PAIN: Primary | ICD-10-CM

## 2024-11-08 LAB
ALBUMIN SERPL-MCNC: 4.4 G/DL (ref 3.5–5.2)
ALBUMIN/GLOB SERPL: 1.5 G/DL
ALP SERPL-CCNC: 94 U/L (ref 39–117)
ALT SERPL W P-5'-P-CCNC: 11 U/L (ref 1–33)
ANION GAP SERPL CALCULATED.3IONS-SCNC: 11.1 MMOL/L (ref 5–15)
AST SERPL-CCNC: 23 U/L (ref 1–32)
BACTERIA UR QL AUTO: ABNORMAL /HPF
BASOPHILS # BLD AUTO: 0.08 10*3/MM3 (ref 0–0.2)
BASOPHILS NFR BLD AUTO: 1.2 % (ref 0–1.5)
BILIRUB SERPL-MCNC: 0.5 MG/DL (ref 0–1.2)
BILIRUB UR QL STRIP: NEGATIVE
BUN SERPL-MCNC: 9 MG/DL (ref 6–20)
BUN/CREAT SERPL: 9.5 (ref 7–25)
CALCIUM SPEC-SCNC: 9.4 MG/DL (ref 8.6–10.5)
CHLORIDE SERPL-SCNC: 104 MMOL/L (ref 98–107)
CLARITY UR: ABNORMAL
CO2 SERPL-SCNC: 23.9 MMOL/L (ref 22–29)
COLOR UR: ABNORMAL
CREAT SERPL-MCNC: 0.95 MG/DL (ref 0.57–1)
DEPRECATED RDW RBC AUTO: 42.4 FL (ref 37–54)
EGFRCR SERPLBLD CKD-EPI 2021: 81.3 ML/MIN/1.73
EOSINOPHIL # BLD AUTO: 0.28 10*3/MM3 (ref 0–0.4)
EOSINOPHIL NFR BLD AUTO: 4.2 % (ref 0.3–6.2)
ERYTHROCYTE [DISTWIDTH] IN BLOOD BY AUTOMATED COUNT: 13.3 % (ref 12.3–15.4)
GLOBULIN UR ELPH-MCNC: 3 GM/DL
GLUCOSE SERPL-MCNC: 94 MG/DL (ref 65–99)
GLUCOSE UR STRIP-MCNC: NEGATIVE MG/DL
HCT VFR BLD AUTO: 38.6 % (ref 34–46.6)
HGB BLD-MCNC: 12.8 G/DL (ref 12–15.9)
HGB UR QL STRIP.AUTO: ABNORMAL
HOLD SPECIMEN: NORMAL
HOLD SPECIMEN: NORMAL
HYALINE CASTS UR QL AUTO: ABNORMAL /LPF
IMM GRANULOCYTES # BLD AUTO: 0.02 10*3/MM3 (ref 0–0.05)
IMM GRANULOCYTES NFR BLD AUTO: 0.3 % (ref 0–0.5)
KETONES UR QL STRIP: NEGATIVE
LEUKOCYTE ESTERASE UR QL STRIP.AUTO: ABNORMAL
LIPASE SERPL-CCNC: 16 U/L (ref 13–60)
LYMPHOCYTES # BLD AUTO: 1.77 10*3/MM3 (ref 0.7–3.1)
LYMPHOCYTES NFR BLD AUTO: 26.5 % (ref 19.6–45.3)
MCH RBC QN AUTO: 28.6 PG (ref 26.6–33)
MCHC RBC AUTO-ENTMCNC: 33.2 G/DL (ref 31.5–35.7)
MCV RBC AUTO: 86.4 FL (ref 79–97)
MONOCYTES # BLD AUTO: 0.56 10*3/MM3 (ref 0.1–0.9)
MONOCYTES NFR BLD AUTO: 8.4 % (ref 5–12)
MUCOUS THREADS URNS QL MICRO: ABNORMAL /HPF
NEUTROPHILS NFR BLD AUTO: 3.97 10*3/MM3 (ref 1.7–7)
NEUTROPHILS NFR BLD AUTO: 59.4 % (ref 42.7–76)
NITRITE UR QL STRIP: NEGATIVE
NRBC BLD AUTO-RTO: 0 /100 WBC (ref 0–0.2)
PH UR STRIP.AUTO: 5.5 [PH] (ref 5–8)
PLATELET # BLD AUTO: 203 10*3/MM3 (ref 140–450)
PMV BLD AUTO: 9.9 FL (ref 6–12)
POTASSIUM SERPL-SCNC: 4.5 MMOL/L (ref 3.5–5.2)
PROT SERPL-MCNC: 7.4 G/DL (ref 6–8.5)
PROT UR QL STRIP: ABNORMAL
RBC # BLD AUTO: 4.47 10*6/MM3 (ref 3.77–5.28)
RBC # UR STRIP: ABNORMAL /HPF
REF LAB TEST METHOD: ABNORMAL
SODIUM SERPL-SCNC: 139 MMOL/L (ref 136–145)
SP GR UR STRIP: 1.01 (ref 1–1.03)
SQUAMOUS #/AREA URNS HPF: ABNORMAL /HPF
UROBILINOGEN UR QL STRIP: ABNORMAL
WBC # UR STRIP: ABNORMAL /HPF
WBC NRBC COR # BLD AUTO: 6.68 10*3/MM3 (ref 3.4–10.8)
WHOLE BLOOD HOLD COAG: NORMAL
WHOLE BLOOD HOLD SPECIMEN: NORMAL

## 2024-11-08 PROCEDURE — 85025 COMPLETE CBC W/AUTO DIFF WBC: CPT | Performed by: STUDENT IN AN ORGANIZED HEALTH CARE EDUCATION/TRAINING PROGRAM

## 2024-11-08 PROCEDURE — 25810000003 SODIUM CHLORIDE 0.9 % SOLUTION: Performed by: PHYSICIAN ASSISTANT

## 2024-11-08 PROCEDURE — 80053 COMPREHEN METABOLIC PANEL: CPT | Performed by: STUDENT IN AN ORGANIZED HEALTH CARE EDUCATION/TRAINING PROGRAM

## 2024-11-08 PROCEDURE — 76705 ECHO EXAM OF ABDOMEN: CPT

## 2024-11-08 PROCEDURE — 81001 URINALYSIS AUTO W/SCOPE: CPT | Performed by: PHYSICIAN ASSISTANT

## 2024-11-08 PROCEDURE — 99284 EMERGENCY DEPT VISIT MOD MDM: CPT | Performed by: STUDENT IN AN ORGANIZED HEALTH CARE EDUCATION/TRAINING PROGRAM

## 2024-11-08 PROCEDURE — 96375 TX/PRO/DX INJ NEW DRUG ADDON: CPT

## 2024-11-08 PROCEDURE — 25010000002 ONDANSETRON PER 1 MG: Performed by: PHYSICIAN ASSISTANT

## 2024-11-08 PROCEDURE — 83690 ASSAY OF LIPASE: CPT | Performed by: STUDENT IN AN ORGANIZED HEALTH CARE EDUCATION/TRAINING PROGRAM

## 2024-11-08 PROCEDURE — 25010000002 HYDROMORPHONE PER 4 MG: Performed by: STUDENT IN AN ORGANIZED HEALTH CARE EDUCATION/TRAINING PROGRAM

## 2024-11-08 PROCEDURE — 96374 THER/PROPH/DIAG INJ IV PUSH: CPT

## 2024-11-08 RX ORDER — PANTOPRAZOLE SODIUM 40 MG/10ML
40 INJECTION, POWDER, LYOPHILIZED, FOR SOLUTION INTRAVENOUS ONCE
Status: COMPLETED | OUTPATIENT
Start: 2024-11-08 | End: 2024-11-08

## 2024-11-08 RX ORDER — DICYCLOMINE HCL 20 MG
20 TABLET ORAL EVERY 6 HOURS PRN
Qty: 24 TABLET | Refills: 0 | Status: SHIPPED | OUTPATIENT
Start: 2024-11-08

## 2024-11-08 RX ORDER — HYDROMORPHONE HYDROCHLORIDE 2 MG/ML
0.5 INJECTION, SOLUTION INTRAMUSCULAR; INTRAVENOUS; SUBCUTANEOUS ONCE
Status: COMPLETED | OUTPATIENT
Start: 2024-11-08 | End: 2024-11-08

## 2024-11-08 RX ORDER — ONDANSETRON 2 MG/ML
4 INJECTION INTRAMUSCULAR; INTRAVENOUS ONCE
Status: COMPLETED | OUTPATIENT
Start: 2024-11-08 | End: 2024-11-08

## 2024-11-08 RX ORDER — PANTOPRAZOLE SODIUM 40 MG/1
40 TABLET, DELAYED RELEASE ORAL DAILY
Qty: 30 TABLET | Refills: 0 | Status: SHIPPED | OUTPATIENT
Start: 2024-11-08

## 2024-11-08 RX ORDER — SODIUM CHLORIDE 0.9 % (FLUSH) 0.9 %
10 SYRINGE (ML) INJECTION AS NEEDED
Status: DISCONTINUED | OUTPATIENT
Start: 2024-11-08 | End: 2024-11-08 | Stop reason: HOSPADM

## 2024-11-08 RX ADMIN — HYDROMORPHONE HYDROCHLORIDE 0.5 MG: 2 INJECTION INTRAMUSCULAR; INTRAVENOUS; SUBCUTANEOUS at 16:59

## 2024-11-08 RX ADMIN — ONDANSETRON 4 MG: 2 INJECTION INTRAMUSCULAR; INTRAVENOUS at 16:58

## 2024-11-08 RX ADMIN — PANTOPRAZOLE SODIUM 40 MG: 40 INJECTION, POWDER, FOR SOLUTION INTRAVENOUS at 18:58

## 2024-11-08 RX ADMIN — SODIUM CHLORIDE 1000 ML: 9 INJECTION, SOLUTION INTRAVENOUS at 17:06

## 2024-11-08 NOTE — DISCHARGE INSTRUCTIONS
Rest.  Avoid fatty or greasy foods.  Continue your Zofran as previously prescribed for nausea and vomiting.  Bentyl as prescribed for abdominal pain.  Protonix as prescribed.  Follow-up with your PCP on Monday for HIDA scan arrangements.   (general surgery) office should be contacting you early in the week for follow-up in office.  Return to the emergency department if symptoms persist or worsen.

## 2024-11-08 NOTE — ED PROVIDER NOTES
Subjective   History of Present Illness  Ms. Garcia is a 34-year-old female with history of rheumatoid arthritis (on Enbrel), depression, prior kidney stones, who presents to the emergency department with a 9-day history of right upper quadrant pain, nausea and intermittent vomiting.  Symptoms are worsened after eating.  She has also developed some mild nonbloody diarrhea.  She was seen in our emergency department for the same symptoms 6 days ago and had a CT scan that was essentially unremarkable except for some retained stool and a 3.3 cm left labial cystic lesion concerning for Bartholin cyst.  Patient also had gallbladder ultrasound which showed some gallbladder sludge but no evidence of acute cholecystitis.  Patient was also diagnosed with UTI during this visit.  Patient was discharged on antibiotics for the UTI and antiemetics and advised to follow-up with her PCP for HIDA scan.  HIDA scan is scheduled but patient returns to ER with increasing right upper quadrant pain, nausea and vomiting.  No fever or chills.  No chest pain or cough.  No shortness of breath.  She denies any vaginal pain.  She notes that she is currently on her menses.      Review of Systems   Constitutional:  Negative for chills and fever.   HENT:  Negative for sore throat.    Respiratory:  Negative for cough and shortness of breath.    Cardiovascular:  Negative for chest pain.   Gastrointestinal:  Positive for abdominal pain (Right upper quadrant), diarrhea, nausea and vomiting. Negative for blood in stool.   Genitourinary:  Negative for dysuria, flank pain and hematuria.   Musculoskeletal:  Positive for back pain (Some radicular pain into the right scapular region).   Skin:  Negative for rash.   Neurological:  Negative for headaches.   Hematological: Negative.    Psychiatric/Behavioral: Negative.         Past Medical History:   Diagnosis Date    Arthritis     Depression     Hx of migraine headaches     Kidney stones     Rheumatoid  arthritis        Allergies   Allergen Reactions    Zithromax [Azithromycin] Anaphylaxis    Mucinex Dm  [Dextromethorphan-Guaifenesin] Hives       Past Surgical History:   Procedure Laterality Date    EYE PTOSIS REPAIR Right     EYE SURGERY  2007    upper eyelid     EYE SURGERY  2015    upper eyelid       Family History   Problem Relation Age of Onset    Hypertension Mother     Arthritis Mother     Hypertension Father     Arrhythmia Father     Cancer Maternal Grandmother     Arthritis Paternal Grandmother     Cancer Paternal Grandfather        Social History     Socioeconomic History    Marital status: Single   Tobacco Use    Smoking status: Never     Passive exposure: Never    Smokeless tobacco: Never   Vaping Use    Vaping status: Never Used   Substance and Sexual Activity    Alcohol use: No    Drug use: No    Sexual activity: Defer           Objective   Physical Exam  Constitutional:       General: She is not in acute distress.     Appearance: She is well-developed. She is not toxic-appearing.   HENT:      Head: Normocephalic.      Mouth/Throat:      Mouth: Mucous membranes are moist.   Eyes:      General: No scleral icterus.     Extraocular Movements: Extraocular movements intact.      Pupils: Pupils are equal, round, and reactive to light.   Cardiovascular:      Rate and Rhythm: Normal rate and regular rhythm.      Heart sounds: Normal heart sounds.   Pulmonary:      Effort: Pulmonary effort is normal.      Breath sounds: Normal breath sounds.   Abdominal:      General: Bowel sounds are normal.      Palpations: Abdomen is soft.      Tenderness: There is abdominal tenderness in the right upper quadrant. There is no guarding or rebound. Negative signs include James's sign.   Musculoskeletal:         General: No swelling or tenderness.      Right lower leg: No edema.      Left lower leg: No edema.   Skin:     General: Skin is warm and dry.   Neurological:      Mental Status: She is alert and oriented to person,  place, and time.   Psychiatric:         Mood and Affect: Mood normal.         Procedures           ED Course      In summary, 34-year-old female with history of rheumatoid arthritis (on Enbrel), returns to the emergency department with continued right upper quadrant pain that began 9 days ago.  She has some associated nausea and vomiting as well.  Patient was seen in our emergency department on 11/2/2020 for with same complaints and had unremarkable CT except for some retained stool and a leftward labial cyst felt to be a Bartholin cyst.  She also had a gallbladder ultrasound on 11/2 showing some gallbladder sludge.  She was also diagnosed with a UTI.  She was discharged home on antibiotics and antiemetics and advised to follow-up for HIDA scan.  Patient has seen her PCP and has a HIDA scan scheduled however, her right upper quadrant pain has worsened and she continues to have nausea with occasional vomiting.    MDM: Differential includes cholecystitis, biliary dyskinesia, UTI, inflammatory bowel disease, etc.    Old records reviewed including prior CT and ultrasound.  Labs and UA ordered.  Zofran and Dilaudid ordered for nausea and pain control. I will gently hydrate her and will get repeat gallbladder u/s.    Normal white count at 6.68.  No anemia.    Normal LFTs.  Normal bili.    Glucose is normal at 94.  Normal creatinine at 0.95.  Normal chemistries.      Gallbladder U/S:  Unremarkable limited abdominal ultrasound.     I spoke with the pt about her workup.  I also discussed the case with general surgery (Dr. Gorman).  He inquired whether a HIDA scan could be done over the weekend if pt was admitted.  I contacted nuclear medicine and was advised that they don't do HIDA scans over the weekend.  Dr. Gorman felt that there was nothing to offer from a surgical standpoint without some further supporting evidence that this was secondary to gallbladder dz.  He suggested d/c on Bentyl and Protonix and antiemetics and  advised that he would have his staff contact the pt to be seen in office early next week.  The pt has an order in for a HIDA scan next week as well.  Pt is agreeable with plan for d/c on Bentyl and Protonix.  She states she has plenty of Zofran at home.                    .              No data recorded                             Medical Decision Making      Final diagnoses:   Right upper quadrant pain       ED Disposition  ED Disposition       ED Disposition   Discharge    Condition   Stable    Comment   --               Reuben Mercado,   [address] [place] [address]  [phone]      call Monday to arrange for the HIDA scan to be done    Randy Gorman MD  [address] [facility]  [address]  [facility] [address] [phone]      you should here from his office for follow up next week.         Medication List        New Prescriptions      dicyclomine 20 MG tablet  Commonly known as: BENTYL  Take 1 tablet by mouth Every 6 (Six) Hours As Needed (abdominal pain).            Changed      pantoprazole 40 MG EC tablet  Commonly known as: PROTONIX  Take 1 tablet by mouth Daily.  What changed:   medication strength  how much to take               Where to Get Your Medications        These medications were sent to [facility] [id number] - [place], [place] - [address] AT [facility]. - [phone]  - [phone] [facility] [address], [facility] [address]      Phone: [phone]   dicyclomine 20 MG tablet  pantoprazole 40 MG EC tablet            Deep Brantley, PA  11/08/24 [id number]

## 2024-11-19 DIAGNOSIS — G56.41 COMPLEX REGIONAL PAIN SYNDROME TYPE 2 OF RIGHT UPPER EXTREMITY: ICD-10-CM

## 2024-11-19 DIAGNOSIS — M25.50 POLYARTHRALGIA: ICD-10-CM

## 2024-11-19 DIAGNOSIS — M79.18 CERVICAL MYOFASCIAL PAIN SYNDROME: Chronic | ICD-10-CM

## 2024-11-19 DIAGNOSIS — M05.79 RHEUMATOID ARTHRITIS INVOLVING MULTIPLE SITES WITH POSITIVE RHEUMATOID FACTOR: ICD-10-CM

## 2024-11-19 RX ORDER — DICLOFENAC SODIUM 25 MG/1
25 TABLET, DELAYED RELEASE ORAL 2 TIMES DAILY
Qty: 180 TABLET | Refills: 2 | Status: SHIPPED | OUTPATIENT
Start: 2024-11-19

## 2024-11-25 ENCOUNTER — HOSPITAL ENCOUNTER (OUTPATIENT)
Dept: NUCLEAR MEDICINE | Facility: HOSPITAL | Age: 34
Discharge: HOME | End: 2024-11-25
Payer: MEDICAID

## 2024-11-25 DIAGNOSIS — K82.8 SLUDGE IN GALLBLADDER: ICD-10-CM

## 2024-11-25 DIAGNOSIS — R10.11 RUQ PAIN: ICD-10-CM

## 2024-11-25 PROCEDURE — 78227 HEPATOBIL SYST IMAGE W/DRUG: CPT

## 2024-11-25 PROCEDURE — A9537 TC99M MEBROFENIN: HCPCS | Performed by: NURSE PRACTITIONER

## 2024-11-25 PROCEDURE — 34310000005 TECHNETIUM TC 99M MEBROFENIN KIT: Performed by: NURSE PRACTITIONER

## 2024-11-25 RX ORDER — KIT FOR THE PREPARATION OF TECHNETIUM TC 99M MEBROFENIN 45 MG/10ML
1 INJECTION, POWDER, LYOPHILIZED, FOR SOLUTION INTRAVENOUS
Status: COMPLETED | OUTPATIENT
Start: 2024-11-25 | End: 2024-11-25

## 2024-11-25 RX ORDER — SINCALIDE 5 UG/5ML
0.02 INJECTION, POWDER, LYOPHILIZED, FOR SOLUTION INTRAVENOUS
Status: DISCONTINUED | OUTPATIENT
Start: 2024-11-25 | End: 2024-11-25

## 2024-11-25 RX ORDER — KIT FOR THE PREPARATION OF TECHNETIUM TC 99M MEBROFENIN 45 MG/10ML
1 INJECTION, POWDER, LYOPHILIZED, FOR SOLUTION INTRAVENOUS
Status: CANCELLED | OUTPATIENT
Start: 2024-11-25 | End: 2024-11-25

## 2024-11-25 RX ADMIN — MEBROFENIN 1 DOSE: 45 INJECTION, POWDER, LYOPHILIZED, FOR SOLUTION INTRAVENOUS at 13:42

## 2024-11-25 NOTE — H&P (VIEW-ONLY)
Patient: Becky Garcia    YOB: 1990    Date: 11/26/2024    Primary Care Provider: Reuben Mercado DO    Chief Complaint   Patient presents with    Abdominal Pain       SUBJECTIVE:    History of present illness:  The patient is in the office today for evaluation and treatment of abdominal pain.  Patient states that she has had about a month history of worsening abdominal pain in the epigastrium and right upper quadrant.  Eating makes this worse.  She is also had associated nausea.  She has chronic constipation without recent change.    The following portions of the patient's history were reviewed and updated as appropriate: allergies, current medications, past family history, past medical history, past social history, past surgical history and problem list.      Review of Systems   Constitutional:  Negative for chills, fever and unexpected weight change.   HENT:  Negative for hearing loss, trouble swallowing and voice change.    Eyes:  Negative for visual disturbance.   Respiratory:  Negative for apnea, cough, chest tightness, shortness of breath and wheezing.    Cardiovascular:  Negative for chest pain, palpitations and leg swelling.   Gastrointestinal:  Positive for abdominal pain, constipation and nausea. Negative for abdominal distention, anal bleeding, blood in stool, diarrhea, rectal pain and vomiting.   Endocrine: Negative for cold intolerance and heat intolerance.   Genitourinary:  Negative for difficulty urinating, dysuria and flank pain.   Musculoskeletal:  Negative for back pain and gait problem.   Skin:  Negative for color change, rash and wound.   Neurological:  Negative for dizziness, syncope, speech difficulty, weakness, light-headedness, numbness and headaches.   Hematological:  Negative for adenopathy. Does not bruise/bleed easily.   Psychiatric/Behavioral:  Negative for confusion. The patient is not nervous/anxious.          Allergies:  Allergies   Allergen Reactions     Zithromax [Azithromycin] Anaphylaxis    Mucinex Dm  [Dextromethorphan-Guaifenesin] Hives       Medications:    Current Outpatient Medications:     acetaminophen (TYLENOL) 650 MG 8 hr tablet, Take 1 tablet by mouth., Disp: , Rfl:     amitriptyline (ELAVIL) 10 MG tablet, TAKE ONE TABLET BY MOUTH ONCE NIGHTLY, Disp: 90 tablet, Rfl: 0    diclofenac (VOLTAREN) 25 MG EC tablet, Take 1 tablet by mouth 2 (Two) Times a Day., Disp: 180 tablet, Rfl: 2    dicyclomine (BENTYL) 20 MG tablet, Take 1 tablet by mouth Every 6 (Six) Hours As Needed (abdominal pain)., Disp: 24 tablet, Rfl: 0    DULoxetine (CYMBALTA) 60 MG capsule, TAKE ONE CAPSULE BY MOUTH DAILY, Disp: 90 capsule, Rfl: 0    Enbrel Mini 50 MG/ML solution cartridge, Inject 50 mg under the skin into the appropriate area as directed 1 (One) Time Per Week., Disp: 4 mL, Rfl: 4    gabapentin (NEURONTIN) 800 MG tablet, Take 1 tablet by mouth 2 (Two) Times a Day., Disp: 60 tablet, Rfl: 3    hydroxychloroquine (PLAQUENIL) 200 MG tablet, Take 2 tablets by mouth Daily., Disp: 60 tablet, Rfl: 3    Melatonin 12 MG tablet, Take  by mouth., Disp: , Rfl:     multivitamin with minerals (MULTIVITAMIN ADULTS PO), Take 1 tablet by mouth Daily., Disp: , Rfl:     mupirocin (BACTROBAN) 2 % ointment, Apply to affected area bid-tid, Disp: 30 g, Rfl: 0    ondansetron ODT (ZOFRAN-ODT) 4 MG disintegrating tablet, Place 1 tablet on the tongue Every 8 (Eight) Hours As Needed for Nausea., Disp: 20 tablet, Rfl: 0    pantoprazole (PROTONIX) 40 MG EC tablet, Take 1 tablet by mouth Daily., Disp: 30 tablet, Rfl: 0  No current facility-administered medications for this visit.    History:  Past Medical History:   Diagnosis Date    Arthritis     Depression     Hx of migraine headaches     Kidney stones     Rheumatoid arthritis        Past Surgical History:   Procedure Laterality Date    EYE PTOSIS REPAIR Right     EYE SURGERY  2007    upper eyelid     EYE SURGERY  2015    upper eyelid       Family History  "  Problem Relation Age of Onset    Hypertension Mother     Arthritis Mother     Hypertension Father     Arrhythmia Father     Cancer Maternal Grandmother     Arthritis Paternal Grandmother     Cancer Paternal Grandfather        Social History     Tobacco Use    Smoking status: Never     Passive exposure: Never    Smokeless tobacco: Never   Vaping Use    Vaping status: Never Used   Substance Use Topics    Alcohol use: No    Drug use: No        OBJECTIVE:    Vital Signs:   Vitals:    11/26/24 0913   BP: 128/72   Pulse: 93   Temp: 97.7 °F (36.5 °C)   SpO2: 99%   Weight: 109 kg (241 lb)   Height: 167.6 cm (65.98\")       Physical Exam:       General Appearance:    Alert, cooperative, in no acute distress   Head:    Normocephalic, without obvious abnormality, atraumatic   Eyes:            Normal.  No scleral icterus.  PERRLA    Lungs:     Clear to auscultation,respirations regular, even and                  unlabored    Heart:    Regular rhythm and normal rate, normal S1 and S2, no            murmur   Abdomen:   Soft and not distended with mild right upper quadrant tenderness.   Extremities:   Moves all extremities well, no edema, no cyanosis, no             redness   Skin:   No bleeding, bruising or rash   Neurologic:   Normal without gross deficits.   Psychiatric: No evidence of depression or anxiety          Results Review:   I reviewed the patient's new clinical results.  CBC and CMP as well as ultrasound and HIDA scan were reviewed    Review of Systems was reviewed and confirmed as accurate as documented by the MA.    ASSESSMENT/PLAN:    1. Right upper quadrant abdominal pain    2. Abnormal biliary HIDA scan    3. Nausea        Suspect biliary colic given the abnormal HIDA scan.  Initially, however, I would like to perform an EGD about upper GI source such as ulcer since she has been on longstanding NSAIDs for her rheumatoid arthritis.  I explained the procedure to the patient as well as the risks of bleeding and " perforation and they understand the ramifications of these potential complications and they wish to proceed.  If this is normal I believe it would be reasonable to proceed with a laparoscopic cholecystectomy as her symptoms are consistent with likely biliary colic.  I offered the patient a laparoscopic cholecystectomy.  I explained this procedure to them in detail and they understand the procedure.  They also understand the risks of bleeding, infection, bile leak, ductal injury, bowel injury, the possibility of converting to an open procedure etc. They understand all of these risks and I have answered all of their questions and they wish to proceed the EGD is normal.          Electronically signed by Randy Gorman MD  11/26/24

## 2024-11-26 ENCOUNTER — OFFICE VISIT (OUTPATIENT)
Dept: SURGERY | Facility: CLINIC | Age: 34
End: 2024-11-26
Payer: MEDICAID

## 2024-11-26 ENCOUNTER — PATIENT ROUNDING (BHMG ONLY) (OUTPATIENT)
Dept: SURGERY | Facility: CLINIC | Age: 34
End: 2024-11-26
Payer: MEDICAID

## 2024-11-26 VITALS
HEART RATE: 93 BPM | DIASTOLIC BLOOD PRESSURE: 72 MMHG | TEMPERATURE: 97.7 F | BODY MASS INDEX: 38.73 KG/M2 | WEIGHT: 241 LBS | OXYGEN SATURATION: 99 % | HEIGHT: 66 IN | SYSTOLIC BLOOD PRESSURE: 128 MMHG

## 2024-11-26 DIAGNOSIS — R10.11 RIGHT UPPER QUADRANT ABDOMINAL PAIN: Primary | ICD-10-CM

## 2024-11-26 DIAGNOSIS — R11.0 NAUSEA: ICD-10-CM

## 2024-11-26 DIAGNOSIS — R94.8 ABNORMAL BILIARY HIDA SCAN: ICD-10-CM

## 2024-11-26 PROCEDURE — 99204 OFFICE O/P NEW MOD 45 MIN: CPT | Performed by: SURGERY

## 2024-11-26 PROCEDURE — 1159F MED LIST DOCD IN RCRD: CPT | Performed by: SURGERY

## 2024-11-26 PROCEDURE — 1160F RVW MEDS BY RX/DR IN RCRD: CPT | Performed by: SURGERY

## 2024-11-26 RX ORDER — ONDANSETRON 4 MG/1
4 TABLET, ORALLY DISINTEGRATING ORAL EVERY 8 HOURS PRN
Qty: 20 TABLET | Refills: 0 | Status: SHIPPED | OUTPATIENT
Start: 2024-11-26

## 2024-11-26 RX ORDER — DICYCLOMINE HCL 20 MG
20 TABLET ORAL EVERY 6 HOURS PRN
Qty: 24 TABLET | Refills: 0 | Status: SHIPPED | OUTPATIENT
Start: 2024-11-26

## 2024-11-26 NOTE — PROGRESS NOTES
November 26, 2024    Hello, may I speak with Becky Garcia?    My name is Analilia      I am  with [facility]  [address]  [place] [address] [phone].    Before we get started may I verify your date of birth? 1990    I am calling to officially welcome you to our practice and ask about your recent visit. Is this a good time to talk? yes    Tell me about your visit with us. What things went well?  Great Visit       We're always looking for ways to make our patients' experiences even better. Do you have recommendations on ways we may improve?  no    Overall were you satisfied with your first visit to our practice? yes       I appreciate you taking the time to speak with me today. Is there anything else I can do for you? no      Thank you, and have a great day.

## 2024-11-27 ENCOUNTER — ANESTHESIA (OUTPATIENT)
Dept: GASTROENTEROLOGY | Facility: HOSPITAL | Age: 34
End: 2024-11-27
Payer: MEDICAID

## 2024-11-27 ENCOUNTER — HOSPITAL ENCOUNTER (OUTPATIENT)
Facility: HOSPITAL | Age: 34
Setting detail: HOSPITAL OUTPATIENT SURGERY
Discharge: HOME | End: 2024-11-27
Attending: SURGERY | Admitting: SURGERY
Payer: MEDICAID

## 2024-11-27 ENCOUNTER — SPECIALTY PHARMACY (OUTPATIENT)
Age: 34
End: 2024-11-27
Payer: MEDICAID

## 2024-11-27 ENCOUNTER — ANESTHESIA EVENT (OUTPATIENT)
Dept: GASTROENTEROLOGY | Facility: HOSPITAL | Age: 34
End: 2024-11-27
Payer: MEDICAID

## 2024-11-27 VITALS
DIASTOLIC BLOOD PRESSURE: 61 MMHG | RESPIRATION RATE: 18 BRPM | TEMPERATURE: 98.7 F | HEART RATE: 87 BPM | OXYGEN SATURATION: 99 % | SYSTOLIC BLOOD PRESSURE: 105 MMHG

## 2024-11-27 DIAGNOSIS — R10.11 RIGHT UPPER QUADRANT ABDOMINAL PAIN: ICD-10-CM

## 2024-11-27 DIAGNOSIS — R11.0 NAUSEA: ICD-10-CM

## 2024-11-27 DIAGNOSIS — R94.8 ABNORMAL BILIARY HIDA SCAN: ICD-10-CM

## 2024-11-27 LAB
B-HCG UR QL: NEGATIVE
EXPIRATION DATE: NORMAL
INTERNAL NEGATIVE CONTROL: NORMAL
INTERNAL POSITIVE CONTROL: NORMAL
Lab: NORMAL

## 2024-11-27 PROCEDURE — 25010000002 PROPOFOL 200 MG/20ML EMULSION: Performed by: NURSE ANESTHETIST, CERTIFIED REGISTERED

## 2024-11-27 PROCEDURE — 81025 URINE PREGNANCY TEST: CPT | Performed by: SURGERY

## 2024-11-27 PROCEDURE — 25010000002 LIDOCAINE HCL (CARDIAC) 100 MG/5ML SOLUTION PREFILLED SYRINGE: Performed by: NURSE ANESTHETIST, CERTIFIED REGISTERED

## 2024-11-27 RX ORDER — PROPOFOL 10 MG/ML
INJECTION, EMULSION INTRAVENOUS CONTINUOUS PRN
Status: DISCONTINUED | OUTPATIENT
Start: 2024-11-27 | End: 2024-11-27 | Stop reason: SURG

## 2024-11-27 RX ORDER — LIDOCAINE HCL/PF 100 MG/5ML
SYRINGE (ML) INJECTION AS NEEDED
Status: DISCONTINUED | OUTPATIENT
Start: 2024-11-27 | End: 2024-11-27 | Stop reason: SURG

## 2024-11-27 RX ORDER — ONDANSETRON 2 MG/ML
4 INJECTION INTRAMUSCULAR; INTRAVENOUS ONCE AS NEEDED
Status: DISCONTINUED | OUTPATIENT
Start: 2024-11-27 | End: 2024-11-27 | Stop reason: HOSPADM

## 2024-11-27 RX ADMIN — LIDOCAINE HYDROCHLORIDE 60 MG: 20 INJECTION INTRAVENOUS at 08:56

## 2024-11-27 RX ADMIN — PROPOFOL 130 MCG/KG/MIN: 10 INJECTION, EMULSION INTRAVENOUS at 08:57

## 2024-11-27 NOTE — ANESTHESIA POSTPROCEDURE EVALUATION
Patient: Becky Garcia    Procedure Summary       Date: 11/27/24 Room / Location: [facility] ENDOSCOPY 2 / [facility] ENDOSCOPY    Anesthesia Start: 0855 Anesthesia Stop: 0911    Procedure: ESOPHAGOGASTRODUODENOSCOPY WITH BIOPSY (Esophagus) Diagnosis:       Right upper quadrant abdominal pain      Nausea      (Right upper quadrant abdominal pain [R10.11])      (Nausea [R11.0])    Surgeons: Randy Gorman MD Provider: Vin Mckinney CRNA    Anesthesia Type: MAC ASA Status: 2            Anesthesia Type: MAC    Vitals  No vitals data found for the desired time range.          Post Anesthesia Care and Evaluation    Patient location during evaluation: bedside  Patient participation: complete - patient participated  Level of consciousness: awake and alert  Pain score: 0  Pain management: adequate    Airway patency: patent  Anesthetic complications: No anesthetic complications  PONV Status: none  Cardiovascular status: acceptable  Respiratory status: acceptable  Hydration status: acceptable

## 2024-11-27 NOTE — ANESTHESIA PREPROCEDURE EVALUATION
Anesthesia Evaluation     Patient summary reviewed and Nursing notes reviewed   NPO Solid Status: > 8 hours  NPO Liquid Status: > 8 hours           Airway   Mallampati: II  TM distance: >3 FB  Neck ROM: full  Possible difficult intubation  Dental - normal exam     Pulmonary - normal exam   Cardiovascular - normal exam        Neuro/Psych  (+) headaches, numbness, psychiatric history Depression  GI/Hepatic/Renal/Endo    (+) renal disease- stones    Musculoskeletal     Abdominal  - normal exam   Substance History      OB/GYN          Other                    Anesthesia Plan    ASA 2     MAC     intravenous induction     Anesthetic plan, risks, benefits, and alternatives have been provided, discussed and informed consent has been obtained with: patient.  Pre-procedure education provided  Plan discussed with CRNA.    CODE STATUS:

## 2024-12-02 ENCOUNTER — SPECIALTY PHARMACY (OUTPATIENT)
Age: 34
End: 2024-12-02
Payer: MEDICAID

## 2024-12-02 LAB — REF LAB TEST METHOD: NORMAL

## 2024-12-02 NOTE — PROGRESS NOTES
Specialty Pharmacy Refill Coordination Note     Becky is a 34 y.o. female contacted today regarding refills of  Enbrel specialty medication(s).    Reviewed and verified with patient: Yes      Specialty medication(s) and dose(s) confirmed: yes    Refill Questions      Flowsheet Row Most Recent Value   Changes to allergies? No   Changes to medications? No   New conditions or infections since last clinic visit Yes   If yes, please describe  gall bladder surgery. Girma spoke to her already about holding medication until after surgery.   Unplanned office visit, urgent care, ED, or hospital admission in the last 4 weeks  No   How does patient/caregiver feel medication is working? Good   Financial problems or insurance changes  No   Since the previous refill, were any specialty medication doses or scheduled injections missed or delayed?  No   Does this patient require a clinical escalation to a pharmacist? No            Delivery Questions      Flowsheet Row Most Recent Value   Delivery method UPS   Delivery address verified with patient/caregiver? Yes   Delivery address Home   Number of medications in delivery 1   Medication(s) being filled and delivered Etanercept (Enbrel Mini)   Doses left of specialty medications 1   Copay verified? Yes   Copay amount $0   Copay form of payment No copayment ($0)   Ship Date 12/5/24   Delivery Date 12/6/24   Signature Required Yes                   Follow-up: 21 day(s)     Temo Whitlock, Pharmacy Technician  Specialty Pharmacy Technician

## 2024-12-04 ENCOUNTER — OUTSIDE FACILITY SERVICE (OUTPATIENT)
Dept: SURGERY | Facility: CLINIC | Age: 34
End: 2024-12-04
Payer: MEDICAID

## 2024-12-04 ENCOUNTER — DOCUMENTATION (OUTPATIENT)
Dept: SURGERY | Facility: CLINIC | Age: 34
End: 2024-12-04

## 2024-12-04 DIAGNOSIS — G89.18 POST-OP PAIN: Primary | ICD-10-CM

## 2024-12-04 RX ORDER — HYDROCODONE BITARTRATE AND ACETAMINOPHEN 5; 325 MG/1; MG/1
1-2 TABLET ORAL EVERY 4 HOURS PRN
Qty: 10 TABLET | Refills: 0 | Status: SHIPPED | OUTPATIENT
Start: 2024-12-04 | End: 2024-12-06

## 2024-12-04 NOTE — PROGRESS NOTES
Procedure   Procedures   PATIENT:     Becky Philippe Cumming    DATE OF SURGERY:         PHYSICIAN:   Randy Gorman MD    REFERRING PHYSICIAN:  Reuben Mercado DO     YOB: 1990    PREOPERATIVE DIAGNOSIS: 1-right upper quadrant pain 2-abnormal biliary HIDA scan 3-nausea    POSTOPERATIVE DIAGNOSIS: Same    PROCEDURE:  Laparoscopic cholecystectomy with intraoperative cholangiography.    ANESTHESIA:  General endotracheal.    EBL: Less than 30 mL    Specimen: Gallbladder    Complications: None    Location: [facility]    OPERATIVE PROCEDURE:  The patient was taken to the operating room, placed in the supine position, and given general endotracheal anesthesia.  The patient was prepped and draped in the normal sterile fashion, and also received preoperative IV antibiotics.  An appropriate timeout was performed by the nursing staff prior to the incision.    Local anesthetic with Marcaine 0.5% was infused locally at all incision sites. An infra- umbilical incision was then made to insert a 5mm opti-view into the peritoneal cavity and the peritoneal cavity was insufflated with carbon dioxide. A separate 5 mm port was inserted in the subxiphoid region  along with a right subcostal 5 mm port.  The gallbladder was well visualized.    Good exposure was obtained and dissection was performed at the junction of the cystic duct and the gallbladder. The cystic duct and cystic artery were identified in the normal manner.  A clip was then placed on the cystic duct proximally and then two clips were placed on the cystic artery proximally and one distally.  Cystic ductotomy was performed.  A separate cholangiogram catheter had been inserted through a right upper quadrant introducer port and then this was fed into the cystic duct itself and a clip was applied.     Intraoperative cholangiography was then performed under fluoroscopy, carefully evaluating the biliary ductal anatomy.  This was done  without difficulty.  No anatomic abnormality was noted.      The cholangiogram catheter was removed.  The cystic duct was clipped twice distally and then divided, as was the cystic artery.  Bovie electrocautery with L-hook was then used to remove the gallbladder from the liver bed.  It was then removed through the right upper quadrant port site.     Copious irrigation was used in the patient’s abdominal cavity.  It was clean and dry without bleeding at this point.  Hemostasis was intact and there was no evidence of bilious leakage. Trocars were removed under direct vision and the skin was closed with 4-0 PDS subcuticular stitch along with Steri-Strips for skin reapproximation.  There were no intraoperative complications.    The patient was stable at this point and subsequently transferred back to the recovery room in stable condition.    Randy Gorman MD    12/4/2024    10:39 EST

## 2024-12-09 NOTE — PROGRESS NOTES
"Patient: Becky Garcia    YOB: 1990    Date: 12/13/2024    Primary Care Provider: Reuben Mercado DO    Chief Complaint   Patient presents with    Post-op Follow-up     Lap karina        History of present illness:  I saw the patient in the office today as a followup from their recent lap karina and EGD.     Overall she states that she is improved.  Particularly the abdominal pain has resolved.  She is still having nausea on a daily basis.    Vital Signs:   Vitals:    12/13/24 0938   BP: 106/74   Pulse: 117   Temp: 98 °F (36.7 °C)   SpO2: 97%   Weight: 106 kg (233 lb 6.4 oz)   Height: 167.6 cm (65.98\")       Physical Exam:    Abdomen: Soft and nondistended.  No tenderness.  Wounds are healing nicely     Assessment / Plan :    1. Postoperative visit      I will refill her Zofran per her request.  Otherwise I think she is doing well.  I told her to follow-up with me if she has any worsening problems.  Nausea may be related to her medications.  EGD was unremarkable.  Activity and dietary instructions were given.    Electronically signed by Randy Gorman MD  12/13/24                "

## 2024-12-13 ENCOUNTER — OFFICE VISIT (OUTPATIENT)
Dept: SURGERY | Facility: CLINIC | Age: 34
End: 2024-12-13
Payer: MEDICAID

## 2024-12-13 VITALS
HEART RATE: 117 BPM | TEMPERATURE: 98 F | SYSTOLIC BLOOD PRESSURE: 106 MMHG | BODY MASS INDEX: 37.51 KG/M2 | OXYGEN SATURATION: 97 % | DIASTOLIC BLOOD PRESSURE: 74 MMHG | WEIGHT: 233.4 LBS | HEIGHT: 66 IN

## 2024-12-13 DIAGNOSIS — Z48.89 POSTOPERATIVE VISIT: Primary | ICD-10-CM

## 2024-12-13 PROCEDURE — 1159F MED LIST DOCD IN RCRD: CPT | Performed by: SURGERY

## 2024-12-13 PROCEDURE — 1160F RVW MEDS BY RX/DR IN RCRD: CPT | Performed by: SURGERY

## 2024-12-13 PROCEDURE — 99024 POSTOP FOLLOW-UP VISIT: CPT | Performed by: SURGERY

## 2024-12-13 RX ORDER — ONDANSETRON 4 MG/1
4 TABLET, FILM COATED ORAL EVERY 12 HOURS PRN
Qty: 60 TABLET | Refills: 0 | Status: SHIPPED | OUTPATIENT
Start: 2024-12-13 | End: 2025-01-12

## 2024-12-17 ENCOUNTER — OFFICE VISIT (OUTPATIENT)
Age: 34
End: 2024-12-17
Payer: MEDICAID

## 2024-12-17 VITALS
HEART RATE: 81 BPM | WEIGHT: 226 LBS | SYSTOLIC BLOOD PRESSURE: 120 MMHG | OXYGEN SATURATION: 98 % | DIASTOLIC BLOOD PRESSURE: 80 MMHG | HEIGHT: 66 IN | BODY MASS INDEX: 36.32 KG/M2

## 2024-12-17 DIAGNOSIS — G56.41 COMPLEX REGIONAL PAIN SYNDROME TYPE 2 OF RIGHT UPPER EXTREMITY: ICD-10-CM

## 2024-12-17 DIAGNOSIS — M79.18 CERVICAL MYOFASCIAL PAIN SYNDROME: Chronic | ICD-10-CM

## 2024-12-17 RX ORDER — AMITRIPTYLINE HYDROCHLORIDE 10 MG/1
10 TABLET ORAL NIGHTLY
Qty: 90 TABLET | Refills: 3 | Status: SHIPPED | OUTPATIENT
Start: 2024-12-17

## 2025-01-05 DIAGNOSIS — M05.79 RHEUMATOID ARTHRITIS INVOLVING MULTIPLE SITES WITH POSITIVE RHEUMATOID FACTOR: ICD-10-CM

## 2025-01-07 PROBLEM — D84.821 IMMUNOSUPPRESSION DUE TO DRUG THERAPY: Status: ACTIVE | Noted: 2025-01-07

## 2025-01-07 PROBLEM — Z79.899 IMMUNOSUPPRESSION DUE TO DRUG THERAPY: Status: ACTIVE | Noted: 2025-01-07

## 2025-01-07 PROBLEM — R53.82 CHRONIC FATIGUE: Status: ACTIVE | Noted: 2025-01-07

## 2025-01-07 PROBLEM — G89.29 CHRONIC RIGHT-SIDED LOW BACK PAIN WITH RIGHT-SIDED SCIATICA: Status: ACTIVE | Noted: 2025-01-07

## 2025-01-07 PROBLEM — M54.41 CHRONIC RIGHT-SIDED LOW BACK PAIN WITH RIGHT-SIDED SCIATICA: Status: ACTIVE | Noted: 2025-01-07

## 2025-01-07 PROBLEM — Z79.899 HIGH RISK MEDICATION USE: Status: ACTIVE | Noted: 2025-01-07

## 2025-01-07 RX ORDER — HYDROXYCHLOROQUINE SULFATE 200 MG/1
400 TABLET, FILM COATED ORAL DAILY
Qty: 60 TABLET | Refills: 3 | Status: SHIPPED | OUTPATIENT
Start: 2025-01-07

## 2025-01-07 NOTE — TELEPHONE ENCOUNTER
Rx Refill Note  Requested Prescriptions     Pending Prescriptions Disp Refills    hydroxychloroquine (PLAQUENIL) 200 MG tablet 60 tablet 3     Sig: Take 2 tablets by mouth Daily.      Last office visit with prescribing clinician: 12/17/2024   Last telemedicine visit with prescribing clinician: Visit date not found   Next office visit with prescribing clinician: Visit date not found     Barbara Honeycutt MA  01/07/25, 09:32 EST

## 2025-01-08 DIAGNOSIS — M05.79 RHEUMATOID ARTHRITIS INVOLVING MULTIPLE SITES WITH POSITIVE RHEUMATOID FACTOR: ICD-10-CM

## 2025-01-08 RX ORDER — HYDROXYCHLOROQUINE SULFATE 200 MG/1
400 TABLET, FILM COATED ORAL DAILY
Qty: 60 TABLET | Refills: 3 | OUTPATIENT
Start: 2025-01-08

## 2025-01-08 NOTE — TELEPHONE ENCOUNTER
Caller: SMITH BECK    Relationship: SELF    Best call back number: [phone]     Requested Prescriptions:   Requested Prescriptions     Pending Prescriptions Disp Refills    hydroxychloroquine (PLAQUENIL) 200 MG tablet 60 tablet 3     Sig: Take 2 tablets by mouth Daily.        Pharmacy where request should be sent:  [facility] [id number] [address] AT [facility] [phone] [facility] [phone] FX     Last office visit with prescribing clinician: 10/2/2024   Last telemedicine visit with prescribing clinician: Visit date not found   Next office visit with prescribing clinician: 1/8/2025     Additional details provided by patient: ONLY HAS 2 DAYS LEFT    Does the patient have less than a 3 day supply:  [x] Yes  [] No    Would you like a call back once the refill request has been completed: [] Yes [x] No    If the office needs to give you a call back, can they leave a voicemail: [] Yes [x] No    Andreia Nagel Rep   01/08/25 08:44 EST

## 2025-01-08 NOTE — PROGRESS NOTES
Established Patient        Chief Complaint: CRPS/mood disorder.       Becky Garcia is a 34 y.o. female    History of Present Illness:   Answers submitted by the patient for this visit:  Primary Reason for Visit (Submitted on 12/12/2024)  What is the primary reason for your visit?: Problem Not Listed  Problem not listed (Submitted on 12/12/2024)  Chief Complaint: Other medical problem  anorexia: Yes  Onset: 1 to 6 months  Chronicity: chronic  Medications tried: Bentyl and zofran  Additional information: Had gallbladder removed 12/04    Denies chest pain, syncope, palpitations or vertigo.  Denies fever, chills or night sweats.  Maintains an active lifestyle, balanced dietary intake; reports good hydration habits.  Denies hematuria/dysuria.  Denies any BRB/BTS.  Denies orthopnea, epistaxis or hemoptysis.    Chronic arthralgias and myalgias.  Responded well to dose adjustment of neuropathic pain medicine at last visit.  She is interested in adjunctive therapy if appropriate.  If patient is status post cholecystectomy.  She reports clean dry incisions to the abdomen, expected tenderness as well.  Tolerating all nutritional intake.  Denies fever, chills or night sweats.  Denies hematuria/dysuria.  Denies any BRB/BTS.    Subjective     The following portions of the patient's history were reviewed and updated as appropriate: allergies, current medications, past family history, past medical history, past social history, past surgical history and problem list.    Allergies   Allergen Reactions    Zithromax [Azithromycin] Anaphylaxis    Mucinex Dm  [Dextromethorphan-Guaifenesin] Hives       Review of Systems  Constitutional: Negative for fever. Negative for chills, diaphoresis, fatigue and unexpected weight change.   HENT: No dysphagia; no changes to vision/hearing/smell/taste; no epistaxis  Eyes: Negative for redness and visual disturbance.   Respiratory: negative for shortness of breath.  "Negative for chest pain . Negative for cough and chest tightness.   Cardiovascular: Negative for chest pain and palpitations.   Gastrointestinal: As per above.  Endocrine: Negative for cold intolerance and heat intolerance.   Genitourinary: Negative for difficulty urinating, dysuria and frequency.   Musculoskeletal: Chronic arthralgias and myalgias.   Skin: CDI.  Neurological: Negative for syncope, weakness and headaches.   Hematological: Negative for adenopathy. Does not bruise/bleed easily.   Psychiatric/Behavioral: Negative for confusion. The patient is not nervous/anxious.    Objective     Physical Exam   Vital Signs: /80   Pulse 81   Ht 167.6 cm (66\")   Wt 103 kg (226 lb)   SpO2 98%   BMI 36.48 kg/m²     General Appearance: alert, oriented x 3, no acute distress.  Skin: warm and dry.  CDI abdomen.  HEENT: Atraumatic.  pupils round and reactive to light and accommodation, oral mucosa pink and moist.  Nares patent without epistaxis.  External auditory canals are patent tympanic membranes intact.  Neck: supple, no JVD, trachea midline.  No thyromegaly  Lungs: CTA, unlabored breathing effort.  Heart: RRR, normal S1 and S2, no S3, no rub.  Abdomen: soft, no focal areas of tenderness, no palpable bladder, present bowel sounds to auscultation ×4.  No guarding or rigidity.  No CVA tenderness.  Extremities: no clubbing, cyanosis or edema.  Good range of motion actively and passively.  Symmetric muscle strength and development  Neuro: normal speech and mental status.  Cranial nerves II through XII intact.  No anosmia. DTR 2+; proprioception intact.  No focal motor/sensory deficits.    Advance Care Planning   ACP discussion was held with the patient during this visit. Patient does not have an advance directive, information provided.         Assessment and Plan      Assessment/Plan:   Diagnoses and all orders for this visit:    1. Cervical myofascial pain syndrome  -     amitriptyline (ELAVIL) 10 MG tablet; Take " 1 tablet by mouth Every Night.  Dispense: 90 tablet; Refill: 3    2. Complex regional pain syndrome type 2 of right upper extremity  -     amitriptyline (ELAVIL) 10 MG tablet; Take 1 tablet by mouth Every Night.  Dispense: 90 tablet; Refill: 3    Planned addition of amitriptyline, continue gabapentin at current dosing.    Keep scheduled follow-up appointment with surgery concerning recent cholecystectomy.    Vital signs demonstrate hemodynamic stability.  Blood pressure is at goal.  No increased work of breathing.    Keep scheduled follow-up appointment with rheumatology.    Plan to advance diet as tolerated.  Maintain appropriate hydration status.    Discussion Summary:    Discussed plan of care in detail with pt today; pt verb understanding and agrees.    Follow up:  Return in about 6 months (around 6/17/2025) for Recheck.     There are no Patient Instructions on file for this visit.    I spent 35 minutes caring for Becky on this date of service. This time includes time spent by me in the following activities:preparing for the visit, performing a medically appropriate examination and/or evaluation , counseling and educating the patient/family/caregiver, ordering medications, tests, or procedures, documenting information in the medical record, and care coordination    I confirm accuracy of unchanged data/findings which have been carried forward from previous visit, as well as I have updated appropriately those that have changed.        Reuben Mercado,   01/08/25  16:28 EST

## 2025-02-05 ENCOUNTER — SPECIALTY PHARMACY (OUTPATIENT)
Age: 35
End: 2025-02-05
Payer: MEDICAID

## 2025-02-05 NOTE — PROGRESS NOTES
Specialty Pharmacy Refill Coordination Note     Becky is a 34 y.o. female contacted today regarding refills of  Enbrel specialty medication(s).    Reviewed and verified with patient:       Specialty medication(s) and dose(s) confirmed: yes    Refill Questions      Flowsheet Row Most Recent Value   Changes to allergies? No   Changes to medications? No   New conditions or infections since last clinic visit Yes   If yes, please describe  had gallbladder removed  in December   Unplanned office visit, urgent care, ED, or hospital admission in the last 4 weeks  No   How does patient/caregiver feel medication is working? Good   Financial problems or insurance changes  No   Since the previous refill, were any specialty medication doses or scheduled injections missed or delayed?  Yes   If yes, please provide the amount 2   Why were doses missed? had gallbladder removed in December   Does this patient require a clinical escalation to a pharmacist? No            Delivery Questions      Flowsheet Row Most Recent Value   Delivery method UPS   Delivery address verified with patient/caregiver? Yes   Delivery address Home   Number of medications in delivery 1   Medication(s) being filled and delivered Etanercept (Enbrel Mini)   Doses left of specialty medications 2   Copay verified? Yes   Copay amount 0   Copay form of payment No copayment ($0)   Ship Date 02/05/25   Delivery Date Selection 02/06/25   Signature Required Yes                   Follow-up: 21 day(s)     Marilin Conde, Pharmacy Technician  Specialty Pharmacy Technician

## 2025-03-03 ENCOUNTER — OFFICE VISIT (OUTPATIENT)
Age: 35
End: 2025-03-03
Payer: MEDICAID

## 2025-03-03 VITALS
BODY MASS INDEX: 35.84 KG/M2 | SYSTOLIC BLOOD PRESSURE: 90 MMHG | WEIGHT: 223 LBS | DIASTOLIC BLOOD PRESSURE: 80 MMHG | OXYGEN SATURATION: 100 % | HEIGHT: 66 IN | HEART RATE: 92 BPM

## 2025-03-03 DIAGNOSIS — M25.50 POLYARTHRALGIA: ICD-10-CM

## 2025-03-03 DIAGNOSIS — S29.012A STRAIN OF THORACIC BACK REGION: ICD-10-CM

## 2025-03-03 DIAGNOSIS — M05.79 RHEUMATOID ARTHRITIS INVOLVING MULTIPLE SITES WITH POSITIVE RHEUMATOID FACTOR: Primary | ICD-10-CM

## 2025-03-03 DIAGNOSIS — G56.41 COMPLEX REGIONAL PAIN SYNDROME TYPE 2 OF RIGHT UPPER EXTREMITY: ICD-10-CM

## 2025-03-03 PROBLEM — Z79.61 LONG TERM (CURRENT) USE OF IMMUNOMODULATOR: Chronic | Status: ACTIVE | Noted: 2025-01-07

## 2025-03-03 RX ORDER — METHYLPREDNISOLONE ACETATE 80 MG/ML
80 INJECTION, SUSPENSION INTRA-ARTICULAR; INTRALESIONAL; INTRAMUSCULAR; SOFT TISSUE ONCE
Status: COMPLETED | OUTPATIENT
Start: 2025-03-03 | End: 2025-03-03

## 2025-03-03 RX ORDER — DEXAMETHASONE 0.5 MG/1
TABLET ORAL
Qty: 21 TABLET | Refills: 0 | Status: SHIPPED | OUTPATIENT
Start: 2025-03-03 | End: 2025-03-18

## 2025-03-03 RX ORDER — KETOROLAC TROMETHAMINE 30 MG/ML
30 INJECTION, SOLUTION INTRAMUSCULAR; INTRAVENOUS ONCE
Status: COMPLETED | OUTPATIENT
Start: 2025-03-03 | End: 2025-03-03

## 2025-03-03 RX ORDER — METHOCARBAMOL 500 MG/1
500 TABLET, FILM COATED ORAL 3 TIMES DAILY PRN
Qty: 60 TABLET | Refills: 1 | Status: SHIPPED | OUTPATIENT
Start: 2025-03-03

## 2025-03-03 RX ADMIN — KETOROLAC TROMETHAMINE 30 MG: 30 INJECTION, SOLUTION INTRAMUSCULAR; INTRAVENOUS at 17:43

## 2025-03-03 RX ADMIN — METHYLPREDNISOLONE ACETATE 80 MG: 80 INJECTION, SUSPENSION INTRA-ARTICULAR; INTRALESIONAL; INTRAMUSCULAR; SOFT TISSUE at 17:45

## 2025-03-05 ENCOUNTER — SPECIALTY PHARMACY (OUTPATIENT)
Age: 35
End: 2025-03-05
Payer: MEDICAID

## 2025-03-05 NOTE — PROGRESS NOTES
Specialty Pharmacy Patient Management Program  Rheumatology Refill Outreach      Becky is a 34 y.o. female contacted today regarding refills of her medication(s).    Reviewed and verified with patient: yes    Specialty medication(s) and dose(s) confirmed: yes    Refill Questions      Flowsheet Row Most Recent Value   Changes to allergies? No   Changes to medications? No   New conditions or infections since last clinic visit No   Unplanned office visit, urgent care, ED, or hospital admission in the last 4 weeks  No   How does patient/caregiver feel medication is working? Good   Financial problems or insurance changes  No   Since the previous refill, were any specialty medication doses or scheduled injections missed or delayed?  No   Does this patient require a clinical escalation to a pharmacist? No          Delivery Questions      Flowsheet Row Most Recent Value   Delivery method UPS   Delivery address verified with patient/caregiver? Yes   Delivery address Home   Number of medications in delivery 1   Medication(s) being filled and delivered Etanercept (Enbrel Mini)   Doses left of specialty medications 1   Copay verified? Yes   Copay amount $0   Copay form of payment No copayment ($0)   Delivery Date Selection 03/07/25   Signature Required Yes            Follow-Up: 21 days    Girma Saunders, PharmD  Pharmacist, Rheumatology

## 2025-03-18 ENCOUNTER — OFFICE VISIT (OUTPATIENT)
Age: 35
End: 2025-03-18
Payer: MEDICAID

## 2025-03-18 VITALS
BODY MASS INDEX: 36.59 KG/M2 | OXYGEN SATURATION: 100 % | HEIGHT: 66 IN | HEART RATE: 83 BPM | WEIGHT: 227.7 LBS | SYSTOLIC BLOOD PRESSURE: 102 MMHG | DIASTOLIC BLOOD PRESSURE: 72 MMHG

## 2025-03-18 DIAGNOSIS — M05.79 RHEUMATOID ARTHRITIS INVOLVING MULTIPLE SITES WITH POSITIVE RHEUMATOID FACTOR: ICD-10-CM

## 2025-03-18 DIAGNOSIS — G56.41 COMPLEX REGIONAL PAIN SYNDROME TYPE 2 OF RIGHT UPPER EXTREMITY: Primary | ICD-10-CM

## 2025-03-18 DIAGNOSIS — N39.0 ACUTE UTI: ICD-10-CM

## 2025-03-18 DIAGNOSIS — N30.01 ACUTE CYSTITIS WITH HEMATURIA: ICD-10-CM

## 2025-03-18 DIAGNOSIS — Z79.61 LONG-TERM CURRENT USE OF IMMUNOMODULATOR: ICD-10-CM

## 2025-03-18 DIAGNOSIS — R10.11 RIGHT UPPER QUADRANT ABDOMINAL PAIN: ICD-10-CM

## 2025-03-18 LAB
BILIRUB BLD-MCNC: ABNORMAL MG/DL
CLARITY, POC: ABNORMAL
COLOR UR: YELLOW
EXPIRATION DATE: ABNORMAL
GLUCOSE UR STRIP-MCNC: NEGATIVE MG/DL
KETONES UR QL: ABNORMAL
LEUKOCYTE EST, POC: ABNORMAL
Lab: ABNORMAL
NITRITE UR-MCNC: NEGATIVE MG/ML
PH UR: 6 [PH] (ref 5–8)
PROT UR STRIP-MCNC: ABNORMAL MG/DL
RBC # UR STRIP: ABNORMAL /UL
SP GR UR: 1.02 (ref 1–1.03)
UROBILINOGEN UR QL: NORMAL

## 2025-03-18 PROCEDURE — 87086 URINE CULTURE/COLONY COUNT: CPT | Performed by: FAMILY MEDICINE

## 2025-03-18 RX ORDER — METHYLPREDNISOLONE ACETATE 80 MG/ML
80 INJECTION, SUSPENSION INTRA-ARTICULAR; INTRALESIONAL; INTRAMUSCULAR; SOFT TISSUE ONCE
Status: COMPLETED | OUTPATIENT
Start: 2025-03-18 | End: 2025-03-18

## 2025-03-18 RX ORDER — CEFTRIAXONE 1 G/1
1 INJECTION, POWDER, FOR SOLUTION INTRAMUSCULAR; INTRAVENOUS ONCE
Status: COMPLETED | OUTPATIENT
Start: 2025-03-18 | End: 2025-03-18

## 2025-03-18 RX ORDER — CEFDINIR 300 MG/1
300 CAPSULE ORAL 2 TIMES DAILY
Qty: 10 CAPSULE | Refills: 0 | Status: SHIPPED | OUTPATIENT
Start: 2025-03-18 | End: 2025-03-23

## 2025-03-18 RX ORDER — KETOROLAC TROMETHAMINE 30 MG/ML
30 INJECTION, SOLUTION INTRAMUSCULAR; INTRAVENOUS EVERY 6 HOURS PRN
Status: SHIPPED | OUTPATIENT
Start: 2025-03-18 | End: 2025-03-23

## 2025-03-18 RX ADMIN — METHYLPREDNISOLONE ACETATE 80 MG: 80 INJECTION, SUSPENSION INTRA-ARTICULAR; INTRALESIONAL; INTRAMUSCULAR; SOFT TISSUE at 15:10

## 2025-03-18 RX ADMIN — KETOROLAC TROMETHAMINE 30 MG: 30 INJECTION, SOLUTION INTRAMUSCULAR; INTRAVENOUS at 15:09

## 2025-03-18 RX ADMIN — CEFTRIAXONE 1 G: 1 INJECTION, POWDER, FOR SOLUTION INTRAMUSCULAR; INTRAVENOUS at 15:36

## 2025-03-18 NOTE — PROGRESS NOTES
Established Patient        Chief Complaint: Back pain/myalgia       Becky Garcia is a 34 y.o. female    History of Present Illness:   Here today in scheduled follow-up visit of complex regional pain syndrome, rheumatoid arthritis, long-term use of immunomodulating therapy.    Patient also complains of right upper quadrant abdominal pain, as well as dysuria.  She describes abnormal urinary symptoms including burning with urination, as well as frequent urgency of urination.  Denies any gross hematuria.  Does have a history of UTIs in the past.    She also reports worsened back pain, predominantly involving the thoracic and lumbar spine.  Denies any falls or injuries.  No abnormal rashes.    Tolerating all nutritional intake.  She denies fever, chills or night sweats.    Subjective     The following portions of the patient's history were reviewed and updated as appropriate: allergies, current medications, past family history, past medical history, past social history, past surgical history and problem list.    Allergies   Allergen Reactions    Zithromax [Azithromycin] Anaphylaxis    Mucinex Dm  [Dextromethorphan-Guaifenesin] Hives       Review of Systems  Constitutional: Negative for fever. Negative for chills, diaphoresis, fatigue and unexpected weight change.   HENT: No dysphagia; no changes to vision/hearing/smell/taste; no epistaxis  Eyes: Negative for redness and visual disturbance.   Respiratory: negative for shortness of breath. Negative for chest pain . Negative for cough and chest tightness.   Cardiovascular: Negative for chest pain and palpitations.   Gastrointestinal: As per above.  Endocrine: Negative for cold intolerance and heat intolerance.   Genitourinary: As per above.  Musculoskeletal: Chronic arthralgias and myalgias.   Skin: no new rashes/lesions.  Neurological: Negative for syncope, weakness and headaches.   Hematological: Negative for adenopathy. Does not  "bruise/bleed easily.   Psychiatric/Behavioral: Negative for confusion. The patient is not nervous/anxious.    Objective     Physical Exam   Vital Signs: /72 (BP Location: Left arm, Patient Position: Sitting, Cuff Size: Adult)   Pulse 83   Ht 167.6 cm (65.98\")   Wt 103 kg (227 lb 11.2 oz)   SpO2 100%   BMI 36.77 kg/m²     General Appearance: alert, oriented x 3, no acute distress.  Skin: warm and dry.  CDI abdomen.  HEENT: Atraumatic.  pupils round and reactive to light and accommodation, oral mucosa pink and moist.  Nares patent without epistaxis.  External auditory canals are patent tympanic membranes intact.  Neck: supple, no JVD, trachea midline.  No thyromegaly  Lungs: CTA, unlabored breathing effort.  Heart: RRR, normal S1 and S2, no S3, no rub.  Abdomen: soft, no focal areas of tenderness, no palpable bladder, present bowel sounds to auscultation ×4.  No guarding or rigidity.  No CVA tenderness.  Extremities: no clubbing, cyanosis or edema.  Good range of motion actively and passively.  Symmetric muscle strength and development  Neuro: normal speech and mental status.  Cranial nerves II through XII intact.  No anosmia. DTR 2+; proprioception intact.  No focal motor/sensory deficits.          Assessment and Plan      Assessment/Plan:   Diagnoses and all orders for this visit:    1. Complex regional pain syndrome type 2 of right upper extremity (Primary)    2. Rheumatoid arthritis involving multiple sites with positive rheumatoid factor    3. Long-term current use of immunomodulator  -     Urine Culture - , Urine, Clean Catch    4. Right upper quadrant abdominal pain  -     POCT urinalysis dipstick, automated    5. Acute UTI  -     Urine Culture - , Urine, Clean Catch  -     cefTRIAXone (ROCEPHIN) injection 1 g  -     cefdinir (OMNICEF) 300 MG capsule; Take 1 capsule by mouth 2 (Two) Times a Day for 5 days.  Dispense: 10 capsule; Refill: 0    6. Acute cystitis with hematuria  -     ketorolac (TORADOL) " injection 30 mg  -     methylPREDNISolone acetate (DEPO-medrol) injection 80 mg    Patient is given an IM injection of ketorolac, 30 mg, as well as an 80 mg IM injection of Depo-Medrol.  She is to notify the office should her symptoms not improve, or if she develops any ill effects to the medications.    Urine culture obtained today, there is an abnormality of her urinalysis.    She is provided a 1 g IM injection of ceftriaxone, as well as a prescription of 5-day course of cefdinir.    Vital signs demonstrate hemodynamic stability.    Discussion Summary:    Discussed plan of care in detail with pt today; pt verb understanding and agrees.    Follow up:  No follow-ups on file.     There are no Patient Instructions on file for this visit.    I spent 30 minutes caring for Becky on this date of service. This time includes time spent by me in the following activities:preparing for the visit, performing a medically appropriate examination and/or evaluation , counseling and educating the patient/family/caregiver, ordering medications, tests, or procedures, documenting information in the medical record, independently interpreting results and communicating that information with the patient/family/caregiver, and care coordination    I confirm accuracy of unchanged data/findings which have been carried forward from previous visit, as well as I have updated appropriately those that have changed.    Reuben Mercado,   03/18/25  15:06 EDT

## 2025-03-19 DIAGNOSIS — M05.79 RHEUMATOID ARTHRITIS INVOLVING MULTIPLE SITES WITH POSITIVE RHEUMATOID FACTOR: ICD-10-CM

## 2025-03-19 DIAGNOSIS — G56.41 COMPLEX REGIONAL PAIN SYNDROME TYPE 2 OF RIGHT UPPER EXTREMITY: ICD-10-CM

## 2025-03-19 RX ORDER — GABAPENTIN 800 MG/1
800 TABLET ORAL 2 TIMES DAILY
Qty: 60 TABLET | Refills: 3 | Status: SHIPPED | OUTPATIENT
Start: 2025-03-19

## 2025-03-19 NOTE — TELEPHONE ENCOUNTER
Rx Refill Note  Requested Prescriptions     Pending Prescriptions Disp Refills    gabapentin (NEURONTIN) 800 MG tablet 60 tablet 3     Sig: Take 1 tablet by mouth 2 (Two) Times a Day.      Last office visit with prescribing clinician: 3/18/2025   Last telemedicine visit with prescribing clinician: Visit date not found   Next office visit with prescribing clinician: Visit date not found     Barbara Honeycutt MA  03/19/25, 09:10 EDT

## 2025-03-20 LAB
BACTERIA UR CULT: NORMAL
BACTERIA UR CULT: NORMAL

## 2025-03-20 NOTE — PROGRESS NOTES
"                      Established Patient        Chief Complaint: CRPS/mood disorder.       Becky Garcia is a 34 y.o. female    History of Present Illness:   Here today for evaluation of worsened arthralgias and thoracic back pain.  Denies fever, chills or night sweats.  Denies any new trauma or injuries.  Denies any abnormal rashes.  Tolerating all nutritional intake.  Denies hematuria/dysuria.  Denies any BRB/BTS.    Subjective     The following portions of the patient's history were reviewed and updated as appropriate: allergies, current medications, past family history, past medical history, past social history, past surgical history and problem list.    Allergies   Allergen Reactions    Zithromax [Azithromycin] Anaphylaxis    Mucinex Dm  [Dextromethorphan-Guaifenesin] Hives       Review of Systems  Constitutional: Negative for fever. Negative for chills, diaphoresis, fatigue and unexpected weight change.   HENT: No dysphagia; no changes to vision/hearing/smell/taste; no epistaxis  Eyes: Negative for redness and visual disturbance.   Respiratory: negative for shortness of breath. Negative for chest pain . Negative for cough and chest tightness.   Cardiovascular: Negative for chest pain and palpitations.   Gastrointestinal: As per above.  Endocrine: Negative for cold intolerance and heat intolerance.   Genitourinary: Negative for difficulty urinating, dysuria and frequency.   Musculoskeletal: Chronic arthralgias and myalgias.  Worsening thoracic back pain as per above.  Skin: No new rashes or lesions.  Neurological: Negative for syncope, weakness and headaches.   Hematological: Negative for adenopathy. Does not bruise/bleed easily.   Psychiatric/Behavioral: Negative for confusion. The patient is not nervous/anxious.    Objective     Physical Exam   Vital Signs: BP 90/80   Pulse 92   Ht 167.6 cm (66\")   Wt 101 kg (223 lb)   SpO2 100%   BMI 35.99 kg/m²     General Appearance: alert, oriented x 3, no " acute distress.  Skin: warm and dry.  CDI abdomen.  HEENT: Atraumatic.  pupils round and reactive to light and accommodation, oral mucosa pink and moist.  Nares patent without epistaxis.  External auditory canals are patent tympanic membranes intact.  Neck: supple, no JVD, trachea midline.  No thyromegaly  Lungs: CTA, unlabored breathing effort.  Heart: RRR, normal S1 and S2, no S3, no rub.  Abdomen: soft, no focal areas of tenderness, no palpable bladder, present bowel sounds to auscultation ×4.  No guarding or rigidity.  No CVA tenderness.  Extremities: no clubbing, cyanosis or edema.  Good range of motion actively and passively.  Symmetric muscle strength and development.  Paravertebral muscular spasticity noted throughout the thoracic spine, extending into the upper lumbar spine additionally.  Neuro: normal speech and mental status.  Cranial nerves II through XII intact.  No anosmia. DTR 2+; proprioception intact.  No focal motor/sensory deficits.          Assessment and Plan      Assessment/Plan:   Diagnoses and all orders for this visit:    1. Rheumatoid arthritis involving multiple sites with positive rheumatoid factor (Primary)  -     methylPREDNISolone acetate (DEPO-medrol) injection 80 mg  -     ketorolac (TORADOL) injection 30 mg  -     methocarbamol (ROBAXIN) 500 MG tablet; Take 1 tablet by mouth 3 (Three) Times a Day As Needed for Muscle Spasms.  Dispense: 60 tablet; Refill: 1  -     Discontinue: dexAMETHasone (DECADRON) 0.5 MG tablet; 6 po x1d; then 5 po x 1d; then 4 po x 1d; then 3 po x 1d; then 2 po x 1d; then 1 po x1d; then STOP  Dispense: 21 tablet; Refill: 0    2. Polyarthralgia  -     methylPREDNISolone acetate (DEPO-medrol) injection 80 mg  -     ketorolac (TORADOL) injection 30 mg  -     methocarbamol (ROBAXIN) 500 MG tablet; Take 1 tablet by mouth 3 (Three) Times a Day As Needed for Muscle Spasms.  Dispense: 60 tablet; Refill: 1  -     Discontinue: dexAMETHasone (DECADRON) 0.5 MG tablet; 6 po  x1d; then 5 po x 1d; then 4 po x 1d; then 3 po x 1d; then 2 po x 1d; then 1 po x1d; then STOP  Dispense: 21 tablet; Refill: 0    3. Complex regional pain syndrome type 2 of right upper extremity    4. Strain of thoracic back region  -     methylPREDNISolone acetate (DEPO-medrol) injection 80 mg  -     ketorolac (TORADOL) injection 30 mg  -     methocarbamol (ROBAXIN) 500 MG tablet; Take 1 tablet by mouth 3 (Three) Times a Day As Needed for Muscle Spasms.  Dispense: 60 tablet; Refill: 1  -     Discontinue: dexAMETHasone (DECADRON) 0.5 MG tablet; 6 po x1d; then 5 po x 1d; then 4 po x 1d; then 3 po x 1d; then 2 po x 1d; then 1 po x1d; then STOP  Dispense: 21 tablet; Refill: 0    Planned utilization of 80 mg IM Depo-Medrol injection in office today, as well as 30 mg IM injection of ketorolac.  She is provided a 6-day low-dose dexamethasone taper additionally.  As needed use of methocarbamol provided.  I have asked patient to notify the office should she develop any ill effect of medications, or if her symptoms progress/worsen.    Vital signs demonstrate hemodynamic stability.  Blood pressure is at goal.    Discussion Summary:    Discussed plan of care in detail with pt today; pt verb understanding and agrees.    Follow up:  No follow-ups on file.     There are no Patient Instructions on file for this visit.    I spent 30 minutes caring for Becky on this date of service. This time includes time spent by me in the following activities:preparing for the visit, performing a medically appropriate examination and/or evaluation , counseling and educating the patient/family/caregiver, ordering medications, tests, or procedures, documenting information in the medical record, and care coordination    I confirm accuracy of unchanged data/findings which have been carried forward from previous visit, as well as I have updated appropriately those that have changed.        Reuben Mercado DO  03/19/25  22:35 EDT

## 2025-04-01 ENCOUNTER — SPECIALTY PHARMACY (OUTPATIENT)
Age: 35
End: 2025-04-01
Payer: MEDICAID

## 2025-04-01 NOTE — PROGRESS NOTES
Specialty Pharmacy Patient Management Program  Refill Outreach     Becky was contacted today regarding refills of their medication(s). Enbrel Mini    Refill Questions      Flowsheet Row Most Recent Value   Changes to allergies? No   Changes to medications? No   New conditions or infections since last clinic visit No   Unplanned office visit, urgent care, ED, or hospital admission in the last 4 weeks  No   How does patient/caregiver feel medication is working? Good   Financial problems or insurance changes  No   Since the previous refill, were any specialty medication doses or scheduled injections missed or delayed?  No   Does this patient require a clinical escalation to a pharmacist? No            Delivery Questions      Flowsheet Row Most Recent Value   Delivery method UPS   Delivery address verified with patient/caregiver? Yes   Delivery address Home   Number of medications in delivery 1   Medication(s) being filled and delivered Etanercept (Enbrel Mini)   Doses left of specialty medications 5   Copay verified? Yes   Copay amount 0   Copay form of payment No copayment ($0)   Delivery Date Selection 04/03/25   Signature Required Yes                 Follow-up: 21 day(s)     Kelly Henderson, Pharmacy Technician  4/1/2025  09:38 EDT

## 2025-04-07 RX ORDER — DULOXETIN HYDROCHLORIDE 60 MG/1
60 CAPSULE, DELAYED RELEASE ORAL DAILY
Qty: 90 CAPSULE | Refills: 0 | Status: SHIPPED | OUTPATIENT
Start: 2025-04-07

## 2025-04-07 NOTE — TELEPHONE ENCOUNTER
Rx Refill Note  Requested Prescriptions     Pending Prescriptions Disp Refills    DULoxetine (CYMBALTA) 60 MG capsule 90 capsule 0     Sig: Take 1 capsule by mouth Daily.      Last office visit with prescribing clinician: 3/18/2025   Last telemedicine visit with prescribing clinician: Visit date not found   Next office visit with prescribing clinician: Visit date not found                         Would you like a call back once the refill request has been completed: [] Yes [] No    If the office needs to give you a call back, can they leave a voicemail: [] Yes [] No    Yvonne Al MA  04/07/25, 11:12 EDT

## 2025-04-15 ENCOUNTER — PATIENT ROUNDING (BHMG ONLY) (OUTPATIENT)
Dept: URGENT CARE | Facility: CLINIC | Age: 35
End: 2025-04-15
Payer: MEDICAID

## 2025-04-22 ENCOUNTER — SPECIALTY PHARMACY (OUTPATIENT)
Age: 35
End: 2025-04-22
Payer: MEDICAID

## 2025-04-22 NOTE — PROGRESS NOTES
Specialty Pharmacy Patient Management Program  Rheumatology Reassessment     Becky Garcai was referred by an Rheumatology provider to the Rheumatology Patient Management program offered by [facility] for Rheumatoid Arthritis. A follow-up outreach was conducted, including assessment of continued therapy appropriateness, medication adherence, and side effect incidence and management for Enbrel.    Changes to Insurance Coverage or Financial Support  [place] medicaid    Relevant Past Medical History and Comorbidities  Relevant medical history and concomitant health conditions were discussed with the patient. The patient's chart has been reviewed for relevant past medical history and comorbid health conditions and updated as necessary.   Past Medical History:   Diagnosis Date    Arthritis June 2020    Depression     Hx of migraine headaches     occasionally    Kidney stones     Rheumatoid arthritis     Urinary tract infection      Social History     Socioeconomic History    Marital status: Single   Tobacco Use    Smoking status: Never     Passive exposure: Never    Smokeless tobacco: Never   Vaping Use    Vaping status: Never Used   Substance and Sexual Activity    Alcohol use: No    Drug use: No    Sexual activity: Never     Problem list reviewed by Fadia Brantley, PharmD on 4/22/2025 at 10:14 AM    Hospitalizations and Urgent Care Since Last Assessment  ED Visits, Admissions, or Hospitalizations: no  Urgent Office Visits: no    Allergies  Known allergies and reactions were discussed with the patient. The patient's chart has been reviewed for allergy information and updated as necessary.   Allergies   Allergen Reactions    Zithromax [Azithromycin] Anaphylaxis    Mucinex Dm  [Dextromethorphan-Guaifenesin] Hives     Allergies reviewed by Fadia Brantley, PharmD on 4/22/2025 at 10:14 AM    Relevant Laboratory Values  Relevant laboratory values were discussed with the patient. The following  "specialty medication dose adjustment(s) are recommended:     Lab Results   Component Value Date    HGBA1C 5.1 03/20/2024     Lab Results   Component Value Date    GLUCOSE 94 11/08/2024    CALCIUM 9.4 11/08/2024     11/08/2024    K 4.5 11/08/2024    CO2 23.9 11/08/2024     11/08/2024    BUN 9 11/08/2024    CREATININE 0.95 11/08/2024    EGFRIFAFRI 121 01/29/2018    EGFRIFNONA 84 12/23/2021    BCR 9.5 11/08/2024    ANIONGAP 11.1 11/08/2024     No results found for: \"CHOL\", \"CHLPL\", \"TRIG\", \"HDL\", \"LDL\", \"LDLDIRECT\"    Current Medication List  This medication list has been reviewed with the patient and evaluated for any interactions or necessary modifications/recommendations, and updated to include all prescription medications, OTC medications, and supplements the patient is currently taking.  This list reflects what is contained in the patient's profile, which has also been marked as reviewed to communicate to other providers it is the most up to date version of the patient's current medication therapy.     Current Outpatient Medications:     acetaminophen (TYLENOL) 650 MG 8 hr tablet, Take 1 tablet by mouth., Disp: , Rfl:     amitriptyline (ELAVIL) 10 MG tablet, Take 1 tablet by mouth Every Night., Disp: 90 tablet, Rfl: 3    diclofenac (VOLTAREN) 25 MG EC tablet, Take 1 tablet by mouth 2 (Two) Times a Day., Disp: 180 tablet, Rfl: 2    DULoxetine (CYMBALTA) 60 MG capsule, Take 1 capsule by mouth Daily., Disp: 90 capsule, Rfl: 0    Enbrel Mini 50 MG/ML solution cartridge, Inject 50 mg under the skin into the appropriate area as directed 1 (One) Time Per Week., Disp: 4 mL, Rfl: 4    gabapentin (NEURONTIN) 800 MG tablet, Take 1 tablet by mouth 2 (Two) Times a Day., Disp: 60 tablet, Rfl: 3    hydroxychloroquine (PLAQUENIL) 200 MG tablet, Take 2 tablets by mouth Daily., Disp: 60 tablet, Rfl: 3    Melatonin 12 MG tablet, Take  by mouth., Disp: , Rfl:     methocarbamol (ROBAXIN) 500 MG tablet, Take 1 tablet by " mouth 3 (Three) Times a Day As Needed for Muscle Spasms., Disp: 60 tablet, Rfl: 1    multivitamin with minerals (MULTIVITAMIN ADULTS PO), Take 1 tablet by mouth Daily., Disp: , Rfl:     pantoprazole (PROTONIX) 40 MG EC tablet, Take 1 tablet by mouth Daily., Disp: 30 tablet, Rfl: 0  No current facility-administered medications for this visit.    Medicines reviewed by Fadia Brantley PharmD on 4/22/2025 at 10:14 AM    Drug Interactions  No medication interactions      Adverse Drug Reactions  Medication tolerability: Tolerating with no to minimal ADRs  Medication plan: Continue therapy with normal follow-up  Plan for ADR Management: no ADRs    Adherence, Self-Administration, and Current Therapy Problems  Adherence related to the patient's specialty therapy was discussed with the patient. The Adherence segment of this outreach has been reviewed and updated.     Adherence Questions  Linked Medication(s) Assessed: Etanercept (Enbrel Mini)  On average, how many doses/injections does the patient miss per month?: 0  What are the identified reasons for non-adherence or missed doses? : no problems identified  What is the estimated medication adherence level?: %  Based on the patient/caregiver response and refill history, does this patient require an MTP to track adherence improvements?: no    Additional Barriers to Patient Self-Administration: no  Methods for Supporting Patient Self-Administration: no    Open Medication Therapy Problems  No medication therapy recommendations to display    Goals of Therapy  Goals related to the patient's specialty therapy were discussed with the patient. The Patient Goals segment of this outreach has been reviewed and updated.   Goals Addressed Today        Specialty Pharmacy General Goal      Reduce number of flares and pain score.      4.22.25: Increased mobility and less pain on enbrel              Quality of Life Assessment   Quality of Life related to the patient's enrollment in the  patient management program and services provided was discussed with the patient. The QOL segment of this outreach has been reviewed and updated.  Quality of Life Improvement Scale: 9-A good deal better    Reassessment Plan & Follow-Up  1. Medication Therapy Changes: Enbrel 50mg every 7 days  2. Related Plans, Therapy Recommendations, or Issues to Be Addressed: patient needs a new mini cartridge injector device, we should have some in the office. Will get one to the patient. No other questions or concerns at this time.  3. Pharmacist to perform regular assessments no more than (6) months from the previous assessment.  4. Care Coordinator to set up future refill outreaches, coordinate prescription delivery, and escalate clinical questions to pharmacist.    Attestation  Therapeutic appropriateness: Appropriate   I attest the patient was actively involved in and has agreed to the above plan of care.  If the prescribed therapy is at any point deemed not appropriate based on the current or future assessments, a consultation will be initiated with the patient's specialty care provider to determine the best course of action. The revised plan of therapy will be documented along with any required assessments and/or additional patient education provided.     Fadia Brantley, Sami, BCPS  Clinical Specialty Pharmacist, Rheumatology   4/22/2025  10:23 EDT

## 2025-04-22 NOTE — PROGRESS NOTES
Specialty Pharmacy Patient Management Program  Rheumatology Refill Outreach      Becky is a 34 y.o. female contacted today regarding refills of her medication(s).    Reviewed and verified with patient: yes    Specialty medication(s) and dose(s) confirmed: yes    Refill Questions      Flowsheet Row Most Recent Value   Changes to allergies? No   Changes to medications? No   New conditions or infections since last clinic visit No   Unplanned office visit, urgent care, ED, or hospital admission in the last 4 weeks  No   How does patient/caregiver feel medication is working? Very good   Financial problems or insurance changes  No   Since the previous refill, were any specialty medication doses or scheduled injections missed or delayed?  No   Does this patient require a clinical escalation to a pharmacist? No          Delivery Questions      Flowsheet Row Most Recent Value   Delivery method UPS   Delivery address verified with patient/caregiver? Yes   Delivery address Home   Number of medications in delivery 1   Medication(s) being filled and delivered Etanercept (Enbrel Mini)   Doses left of specialty medications 1   Copay verified? Yes   Copay amount $0   Copay form of payment No copayment ($0)   Delivery Date Selection 04/24/25   Signature Required No   Do you consent to receive electronic handouts?  No            Follow-Up: 23 days    Fadia Brantley, PharmD, BCPS  Pharmacist, Rheumatology

## 2025-04-23 ENCOUNTER — SPECIALTY PHARMACY (OUTPATIENT)
Facility: HOSPITAL | Age: 35
End: 2025-04-23
Payer: MEDICAID

## 2025-04-23 DIAGNOSIS — M05.79 RHEUMATOID ARTHRITIS INVOLVING MULTIPLE SITES WITH POSITIVE RHEUMATOID FACTOR: ICD-10-CM

## 2025-04-23 RX ORDER — ETANERCEPT 50 MG/ML
50 SOLUTION SUBCUTANEOUS WEEKLY
Qty: 4 ML | Refills: 2 | Status: SHIPPED | OUTPATIENT
Start: 2025-04-23

## 2025-04-23 NOTE — PROGRESS NOTES
Specialty Pharmacy Patient Management Program  Per Protocol Prescription Order/Refill     Patient currently fills medications at [facility] and is enrolled in an Rheumatology Patient Management Program.     Requested Prescriptions     Signed Prescriptions Disp Refills    Enbrel Mini 50 MG/ML solution cartridge 4 mL 2     Sig: Inject 50 mg under the skin into the appropriate area as directed 1 (One) Time Per Week.     Prescription orders above were sent to the pharmacy per Collaborative Care Agreement Protocol.

## 2025-05-12 ENCOUNTER — OFFICE VISIT (OUTPATIENT)
Age: 35
End: 2025-05-12
Payer: MEDICAID

## 2025-05-12 VITALS
HEART RATE: 106 BPM | HEIGHT: 66 IN | OXYGEN SATURATION: 99 % | SYSTOLIC BLOOD PRESSURE: 110 MMHG | BODY MASS INDEX: 36.48 KG/M2 | DIASTOLIC BLOOD PRESSURE: 80 MMHG | WEIGHT: 227 LBS

## 2025-05-12 DIAGNOSIS — M23.92 INTERNAL DERANGEMENT OF LEFT KNEE: ICD-10-CM

## 2025-05-12 DIAGNOSIS — M06.9 RHEUMATOID ARTHRITIS INVOLVING MULTIPLE SITES, UNSPECIFIED WHETHER RHEUMATOID FACTOR PRESENT: ICD-10-CM
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
## 2025-05-12 DIAGNOSIS — M25.562 ARTHRALGIA OF KNEE, LEFT: ICD-10-CM

## 2025-05-12 DIAGNOSIS — M25.462 EFFUSION OF LEFT KNEE JOINT: ICD-10-CM

## 2025-05-12 DIAGNOSIS — Z79.61 LONG-TERM CURRENT USE OF IMMUNOMODULATOR: Primary | ICD-10-CM

## 2025-05-12 DIAGNOSIS — I95.1 ORTHOSTASIS: ICD-10-CM

## 2025-05-12 PROCEDURE — 85025 COMPLETE CBC W/AUTO DIFF WBC: CPT | Performed by: FAMILY MEDICINE

## 2025-05-12 PROCEDURE — 80048 BASIC METABOLIC PNL TOTAL CA: CPT | Performed by: FAMILY MEDICINE

## 2025-05-12 RX ORDER — NABUMETONE 500 MG/1
500 TABLET, FILM COATED ORAL 2 TIMES DAILY WITH MEALS
Qty: 28 TABLET | Refills: 0 | Status: SHIPPED | OUTPATIENT
Start: 2025-05-12 | End: 2025-05-26

## 2025-05-14 LAB
BASOPHILS # BLD AUTO: 0.1 X10E3/UL (ref 0–0.2)
BASOPHILS NFR BLD AUTO: 1 %
BUN SERPL-MCNC: 8 MG/DL (ref 6–20)
BUN/CREAT SERPL: 11 (ref 9–23)
CALCIUM SERPL-MCNC: 9.1 MG/DL (ref 8.7–10.2)
CHLORIDE SERPL-SCNC: 103 MMOL/L (ref 96–106)
CO2 SERPL-SCNC: 19 MMOL/L (ref 20–29)
CREAT SERPL-MCNC: 0.7 MG/DL (ref 0.57–1)
EGFRCR SERPLBLD CKD-EPI 2021: 117 ML/MIN/1.73
EOSINOPHIL # BLD AUTO: 0.3 X10E3/UL (ref 0–0.4)
EOSINOPHIL NFR BLD AUTO: 4 %
ERYTHROCYTE [DISTWIDTH] IN BLOOD BY AUTOMATED COUNT: 13.3 % (ref 11.7–15.4)
GLUCOSE SERPL-MCNC: 95 MG/DL (ref 70–99)
HCT VFR BLD AUTO: 40.5 % (ref 34–46.6)
HGB BLD-MCNC: 12.7 G/DL (ref 11.1–15.9)
IMM GRANULOCYTES # BLD AUTO: 0 X10E3/UL (ref 0–0.1)
IMM GRANULOCYTES NFR BLD AUTO: 0 %
LYMPHOCYTES # BLD AUTO: 1.8 X10E3/UL (ref 0.7–3.1)
LYMPHOCYTES NFR BLD AUTO: 22 %
MCH RBC QN AUTO: 28.7 PG (ref 26.6–33)
MCHC RBC AUTO-ENTMCNC: 31.4 G/DL (ref 31.5–35.7)
MCV RBC AUTO: 91 FL (ref 79–97)
MONOCYTES # BLD AUTO: 0.6 X10E3/UL (ref 0.1–0.9)
MONOCYTES NFR BLD AUTO: 8 %
NEUTROPHILS # BLD AUTO: 5.3 X10E3/UL (ref 1.4–7)
NEUTROPHILS NFR BLD AUTO: 65 %
PLATELET # BLD AUTO: 202 X10E3/UL (ref 150–450)
POTASSIUM SERPL-SCNC: 4.4 MMOL/L (ref 3.5–5.2)
RBC # BLD AUTO: 4.43 X10E6/UL (ref 3.77–5.28)
SODIUM SERPL-SCNC: 139 MMOL/L (ref 134–144)
WBC # BLD AUTO: 8.1 X10E3/UL (ref 3.4–10.8)

## 2025-05-21 ENCOUNTER — SPECIALTY PHARMACY (OUTPATIENT)
Age: 35
End: 2025-05-21
Payer: MEDICAID

## 2025-05-21 NOTE — PROGRESS NOTES
Specialty Pharmacy Patient Management Program  Refill Outreach     Becky was contacted today regarding refills of their medication(s).    Refill Questions      Flowsheet Row Most Recent Value   Changes to allergies? No   Changes to medications? No   New conditions or infections since last clinic visit No   Unplanned office visit, urgent care, ED, or hospital admission in the last 4 weeks  No   How does patient/caregiver feel medication is working? Good   Financial problems or insurance changes  No   Since the previous refill, were any specialty medication doses or scheduled injections missed or delayed?  No   Does this patient require a clinical escalation to a pharmacist? No            Delivery Questions      Flowsheet Row Most Recent Value   Delivery method UPS   Delivery address verified with patient/caregiver? Yes   Delivery address Home   Number of medications in delivery 1   Medication(s) being filled and delivered Etanercept (Enbrel Mini)   Doses left of specialty medications 8   Copay verified? Yes   Copay amount 0   Copay form of payment No copayment ($0)   Delivery Date Selection 05/22/25   Signature Required No                 Follow-up: 21 day(s)     Marilin Conde, Pharmacy Technician  5/21/2025  11:18 EDT

## 2025-05-27 NOTE — PROGRESS NOTES
Established Patient        Chief Complaint: Back pain/myalgia       Becky Garcia is a 34 y.o. female    History of Present Illness:   Here today for evaluation of recent fall with injury.    Patient had a near syncopal episode, described as feeling of passing out after rising from a seated position.  She has had a recent respiratory syndrome which resulted in nausea and poor nutrition/hydration intake.  No loss of consciousness, however did suffer an injury to her left knee.  She has had some sensation of locking, as well as inability to fully extend at times.  Pain is located along the medial/lateral joint line of the left knee.  Has not improved with activity modification.  Does have difficulty with ambulation, including weightbearing activity.    Her respiratory symptoms have improved, as well as nausea.  Tolerating all nutritional/hydration intake.    Subjective     The following portions of the patient's history were reviewed and updated as appropriate: allergies, current medications, past family history, past medical history, past social history, past surgical history and problem list.    Allergies   Allergen Reactions    Zithromax [Azithromycin] Anaphylaxis    Mucinex Dm  [Dextromethorphan-Guaifenesin] Hives       Review of Systems  Constitutional: Negative for fever. Negative for chills, diaphoresis, fatigue and unexpected weight change.   HENT: No dysphagia; no changes to vision/hearing/smell/taste; no epistaxis  Eyes: Negative for redness and visual disturbance.   Respiratory: negative for shortness of breath. Negative for chest pain . Negative for cough and chest tightness.   Cardiovascular: Negative for chest pain and palpitations.   Gastrointestinal: As per above.  Endocrine: Negative for cold intolerance and heat intolerance.   Genitourinary: As per above.  Musculoskeletal: Chronic arthralgias and myalgias.  Left knee instability, as well as effusion and pain.  Skin: no  "new rashes/lesions.  Neurological: Negative for syncope, weakness and headaches.   Hematological: Negative for adenopathy. Does not bruise/bleed easily.   Psychiatric/Behavioral: Negative for confusion. The patient is not nervous/anxious.    Objective     Physical Exam   Vital Signs: /80   Pulse 106   Ht 167.6 cm (66\")   Wt 103 kg (227 lb)   LMP 04/05/2025 (Approximate)   SpO2 99%   BMI 36.64 kg/m²     General Appearance: alert, oriented x 3, no acute distress.  Skin: warm and dry.  CDI abdomen.  HEENT: Atraumatic.  pupils round and reactive to light and accommodation, oral mucosa pink and moist.  Nares patent without epistaxis.  External auditory canals are patent tympanic membranes intact.  Neck: supple, no JVD, trachea midline.  No thyromegaly  Lungs: CTA, unlabored breathing effort.  Heart: RRR, normal S1 and S2, no S3, no rub.  Abdomen: soft, no focal areas of tenderness, no palpable bladder, present bowel sounds to auscultation ×4.  No guarding or rigidity.  No CVA tenderness.  Extremities: no clubbing, cyanosis or edema.  Noted locking sensation of the left knee on A/P ROM testing.  Abmer/Ge sign negative.  Normal dorsiflexion/plantarflexion of the feet.  Quadriceps mechanism intact bilateral knees.  There is significant medial/lateral joint line tenderness, with effusion noted to the left knee.  Unable to perform Danielito's due to discomfort, Lachman's demonstrates at least grade 2 laxity of the ACL, although difficult to fully ascertain due to discomfort.  Neuro: normal speech and mental status.  Cranial nerves II through XII intact.  No anosmia. DTR 2+; proprioception intact.  No focal motor/sensory deficits.          Assessment and Plan      Assessment/Plan:   Diagnoses and all orders for this visit:    1. Long-term current use of immunomodulator (Primary)  -     CBC w AUTO Differential  -     Basic metabolic panel; Future  -     Basic metabolic panel    2. Rheumatoid arthritis involving " multiple sites, unspecified whether rheumatoid factor present    3. Orthostasis  -     CBC w AUTO Differential  -     Basic metabolic panel; Future  -     Basic metabolic panel    4. Fall with significant injury, initial encounter  -     Ambulatory Referral to Orthopedic Surgery    5. Arthralgia of knee, left  -     nabumetone (RELAFEN) 500 MG tablet; Take 1 tablet by mouth 2 (Two) Times a Day With Meals for 14 days.  Dispense: 28 tablet; Refill: 0  -     Ambulatory Referral to Orthopedic Surgery    6. Internal derangement of left knee  -     nabumetone (RELAFEN) 500 MG tablet; Take 1 tablet by mouth 2 (Two) Times a Day With Meals for 14 days.  Dispense: 28 tablet; Refill: 0  -     Ambulatory Referral to Orthopedic Surgery    7. Effusion of left knee joint  -     nabumetone (RELAFEN) 500 MG tablet; Take 1 tablet by mouth 2 (Two) Times a Day With Meals for 14 days.  Dispense: 28 tablet; Refill: 0  -     Ambulatory Referral to Orthopedic Surgery    I have placed a referral to be seen by orthopedic surgery concerning injury to the left knee, resulting in an internal derangement of the knee and significant effusion.  Activity modifications as needed.  Utilize ice compress to the affected knee as needed.     Patient is provided a course of nabumetone, to be taken twice daily with meals for a planned 14-day duration.  I have asked patient to  notify the office should she develop any ill effects to the new medication.    Surveillance CBC/BMP ordered due to immunomodulators therapy.  Subsequent interval BMP order placed additionally.    Vital signs demonstrate hemodynamic stability.    Discussion Summary:    Discussed plan of care in detail with pt today; pt verb understanding and agrees.    I spent 35 minutes caring for Becky on this date of service. This time includes time spent by me in the following activities:preparing for the visit, reviewing tests, performing a medically appropriate examination and/or evaluation ,  counseling and educating the patient/family/caregiver, ordering medications, tests, or procedures, documenting information in the medical record, and care coordination    I confirm accuracy of unchanged data/findings which have been carried forward from previous visit, as well as I have updated appropriately those that have changed.      Follow up:  No follow-ups on file.     There are no Patient Instructions on file for this visit.      Reuben Mercado DO  05/27/25  16:59 EDT

## 2025-06-10 NOTE — ASSESSMENT & PLAN NOTE
Chronic diffuse pain since 2018.  Diagnosis 2020.  Apparently seronegative.  Diagnosed by outside rheumatologist and treated initially with methotrexate, then methotrexate and Arava.  These were ineffective and she had side effects.  Plaquenil started 2022.  Enbrel added 2023.  10/24 labs included negative WILMAR, negative Q TB, negative HLA-B27, negative 14.3.3 ETA, normal CBC, normal CMP, moderately elevated CRP, negative hepatitis panel, negative rheumatoid factor, and negative CCP antibody.  X-ray showed no erosion and 10/24.    She is back on Enbrel and feels much better.  Swollen joint count is: 0, Tender joint count is: 0, Physician global is: 0, VAS is: 5, Patient global is: 2.  CDAI is 2 - Low  Disease Activity  Continue Enbrel and hydroxychloroquine.  I will see her in follow-up in 3 months.

## 2025-06-10 NOTE — PROGRESS NOTES
Office Visit       Date: 06/12/2025   Patient Name: Becky Garcia  MRN: [id number]  YOB: 1990    Referring Physician: No ref. provider found     Chief Complaint: Rheumatoid arthritis      History of Present Illness: Becky Garcia is a 34 y.o. female who is here today for follow-up for rheumatoid arthritis.   She is Plaquenil and Enbrel.    The pt passed out in early May and twisted L knee. LOC from dehydration.   Has small partial ACL tear from the fall.   Restarted Enbrel. It helps greatly.   No significant pain or swelling since starting. Enbrel.   Gets some cramps near joints.   Just prior to Enbrel some increase in stiffness.   No infections or problems with injections.   She is up to date with eye exam.   Pain score is 2/10. EMS is 80 minutes.   She also takes diclofenac and that seems to give some relief.  She currently has about 2 hours of morning stiffness.  She has sciatica which keeps her awake at night.  She has some limitations of her activities of daily living.    It is also notable that she carries a history of complex regional pain syndrome.  She is treated by her primary care provider with gabapentin and amitriptyline for this.      Subjective   Review of Systems: Review of Systems   Constitutional:  Positive for fatigue. Negative for chills, fever and unexpected weight loss.   HENT:  Negative for dental problem, mouth sores, sinus pressure and swollen glands.    Eyes:  Negative for photophobia, pain and redness.   Respiratory:  Negative for apnea, cough, chest tightness and shortness of breath.    Cardiovascular:  Negative for chest pain and leg swelling.   Gastrointestinal:  Positive for abdominal pain, diarrhea and vomiting. Negative for nausea and GERD.   Genitourinary:  Negative for dysuria and hematuria.        Recurrent uti   Musculoskeletal:  Positive for arthralgias, back pain, joint swelling, myalgias, neck pain and neck  stiffness.   Skin:  Negative for dry skin, rash, skin lesions and bruise.   Allergic/Immunologic: Positive for immunocompromised state.   Neurological:  Positive for numbness. Negative for dizziness, seizures, syncope, weakness, headache and memory problem.        Tingling   Hematological:  Negative for adenopathy. Bruises/bleeds easily.   Psychiatric/Behavioral:  Positive for sleep disturbance and depressed mood. Negative for suicidal ideas and stress. The patient is not nervous/anxious.         Past Medical History:   Past Medical History:   Diagnosis Date    ACL injury tear     Arthritis June 2020    Depression     Hx of migraine headaches     occasionally    Kidney stones     Rheumatoid arthritis     Urinary tract infection        Past Surgical History:   Past Surgical History:   Procedure Laterality Date    ENDOSCOPY      ENDOSCOPY N/A 11/27/2024    Procedure: ESOPHAGOGASTRODUODENOSCOPY WITH BIOPSY;  Surgeon: Randy Gorman MD;  Location: [facility] ENDOSCOPY;  Service: Gastroenterology;  Laterality: N/A;    EYE PTOSIS REPAIR Right     EYE SURGERY  2007    upper eyelid     EYE SURGERY  2015    upper eyelid    GALLBLADDER SURGERY  12/04/2024       Family History:   Family History   Problem Relation Age of Onset    Hypertension Mother     Arthritis Mother     Hypertension Father     Arrhythmia Father     Cancer Maternal Grandmother     Arthritis Paternal Grandmother     Cancer Paternal Grandfather        Social History:   Social History     Socioeconomic History    Marital status: Single   Tobacco Use    Smoking status: Never     Passive exposure: Never    Smokeless tobacco: Never   Vaping Use    Vaping status: Never Used   Substance and Sexual Activity    Alcohol use: No    Drug use: No    Sexual activity: Never       Medications:   Current Outpatient Medications:     acetaminophen (TYLENOL) 650 MG 8 hr tablet, Take 1 tablet by mouth., Disp: , Rfl:     amitriptyline (ELAVIL) 10 MG tablet, Take 1 tablet by mouth  "Every Night., Disp: 90 tablet, Rfl: 3    diclofenac (VOLTAREN) 25 MG EC tablet, Take 1 tablet by mouth 2 (Two) Times a Day., Disp: 180 tablet, Rfl: 2    DULoxetine (CYMBALTA) 60 MG capsule, Take 1 capsule by mouth Daily., Disp: 90 capsule, Rfl: 0    Enbrel Mini 50 MG/ML solution cartridge, Inject 50 mg under the skin into the appropriate area as directed 1 (One) Time Per Week., Disp: 4 mL, Rfl: 2    gabapentin (NEURONTIN) 800 MG tablet, Take 1 tablet by mouth 2 (Two) Times a Day., Disp: 60 tablet, Rfl: 3    hydroxychloroquine (PLAQUENIL) 200 MG tablet, Take 2 tablets by mouth Daily., Disp: 60 tablet, Rfl: 3    Melatonin 12 MG tablet, Take  by mouth., Disp: , Rfl:     methocarbamol (ROBAXIN) 500 MG tablet, Take 1 tablet by mouth 3 (Three) Times a Day As Needed for Muscle Spasms., Disp: 60 tablet, Rfl: 1    multivitamin with minerals (MULTIVITAMIN ADULTS PO), Take 1 tablet by mouth Daily., Disp: , Rfl:     pantoprazole (PROTONIX) 40 MG EC tablet, Take 1 tablet by mouth Daily., Disp: 30 tablet, Rfl: 0    Allergies:   Allergies   Allergen Reactions    Zithromax [Azithromycin] Anaphylaxis    Mucinex Dm  [Dextromethorphan-Guaifenesin] Hives       I have reviewed and updated the patient's chief complaint, history of present illness, review of systems, past medical history, surgical history, family history, social history, medications and allergy list as appropriate.     Objective    Vital Signs:   Vitals:    06/12/25 1317   BP: 118/100   Pulse: 95   Temp: 98 °F (36.7 °C)   TempSrc: Infrared   Weight: 102 kg (225 lb)   Height: 167.6 cm (66\")   PainSc: 0-No pain       Body mass index is 36.32 kg/m².     Physical Exam:  General: The patient is well-developed and well nourished. Cooperative, alert and oriented x3. Affect is flat.  Hydration appears normal.   HEENT: Normocephalic and atraumatic. No notable alopecia. Lids and conjunctiva are normal. Pupils are equal and sclera are clear. Oropharynx is clear   NECK: Supple " without adenopathy or masses.  Thyroid is generous in size but I feel no nodules.  CARDIOVASCULAR: Regular rate and rhythm. No murmurs, rubs or gallops   LUNGS: Effort is normal. Lungs are clear bilaterally.  ABDOMEN: Soft, obese, and non-tender without palpable masses or hepatosplenomegaly..   EXTREMITIES: Trace to 1+ edema.  No cyanosis or clubbing.  SKIN: Inspection and palpation are normal.  I see no rashes, nodules, psoriatic lesions, nail pits, or tophi.  She does have mild livedo reticularis.  NEUROLOGIC: Gait is normal.    MUSCULOSKELETAL: Complete joint exam was performed. There was full range of motion of the shoulders, elbows, wrists and hands without soft tissue swelling synovitis or deformities except as noted.  She has adipose tissue on her hands but I see no soft tissue swelling or synovitis.  Hips have good flexion and internal and external rotation.  Knees have no palpable effusions.  There is full extension and flexion.  There is pain in the right knee at full flexion.  Range of motion is well-preserved bilaterally.  Ankles have no soft tissue swelling or synovitis.  BACK:  Straight without scoliosis.  All classic fibromyalgia tender points are present.  Joint Exam 06/12/2025     The following joints were examined and normal:   Left Glenohumeral, Right Glenohumeral, Left Elbow, Right Elbow, Left Wrist, Right Wrist, Left MCP 1, Right MCP 1, Left MCP 2, Right MCP 2, Left MCP 3, Right MCP 3, Left MCP 4, Right MCP 4, Left MCP 5, Right MCP 5, Left IP (thumb), Right IP (thumb), Left PIP 2 (finger), Right PIP 2 (finger), Left PIP 3 (finger), Right PIP 3 (finger), Left PIP 4 (finger), Right PIP 4 (finger), Left PIP 5 (finger), Right PIP 5 (finger), Left Knee, Right Knee       Patient Global: 20 / 100  Provider Global: 0 / 100      Assessment / Plan        Assessment & Plan  Rheumatoid arthritis of multiple sites with negative rheumatoid factor  Chronic diffuse pain since 2018.  Diagnosis 2020.  Apparently  seronegative.  Diagnosed by outside rheumatologist and treated initially with methotrexate, then methotrexate and Arava.  These were ineffective and she had side effects.  Plaquenil started 2022.  Enbrel added 2023.  10/24 labs included negative WILMAR, negative Q TB, negative HLA-B27, negative 14.3.3 ETA, normal CBC, normal CMP, moderately elevated CRP, negative hepatitis panel, negative rheumatoid factor, and negative CCP antibody.  X-ray showed no erosion and 10/24.    She is back on Enbrel and feels much better.  Swollen joint count is: 0, Tender joint count is: 0, Physician global is: 0, VAS is: 5, Patient global is: 2.  CDAI is 2 - Low  Disease Activity  Continue Enbrel and hydroxychloroquine.  I will see her in follow-up in 3 months.  High risk medication use  Plaquenil.  She is aware of the need for yearly ocular exams  Immunosuppression due to drug therapy  Enbrel.  No infections  Complex regional pain syndrome type 2 of right upper extremity  Her primary care provider has her on gabapentin and amitriptyline for this.  Myalgia  She complains of chronic muscle pain.  She does have classic fibromyalgia tender points.  Chronic fatigue  This remains a problem.     Orders Placed This Encounter   Procedures    CBC Auto Differential    Comprehensive Metabolic Panel    C-reactive Protein    Sedimentation Rate       New Medications Ordered This Visit   Medications    hydroxychloroquine (PLAQUENIL) 200 MG tablet     Sig: Take 2 tablets by mouth Daily.     Dispense:  60 tablet     Refill:  3    Enbrel Mini 50 MG/ML solution cartridge     Sig: Inject 50 mg under the skin into the appropriate area as directed 1 (One) Time Per Week.     Dispense:  4 mL     Refill:  2          Follow Up:   Return in about 3 months (around 9/12/2025).        Diogenes Franco MD  [facility]

## 2025-06-12 ENCOUNTER — OFFICE VISIT (OUTPATIENT)
Age: 35
End: 2025-06-12
Payer: MEDICAID

## 2025-06-12 VITALS
HEART RATE: 95 BPM | BODY MASS INDEX: 36.16 KG/M2 | SYSTOLIC BLOOD PRESSURE: 118 MMHG | HEIGHT: 66 IN | DIASTOLIC BLOOD PRESSURE: 100 MMHG | TEMPERATURE: 98 F | WEIGHT: 225 LBS

## 2025-06-12 DIAGNOSIS — R53.82 CHRONIC FATIGUE: ICD-10-CM

## 2025-06-12 DIAGNOSIS — Z79.899 HIGH RISK MEDICATION USE: ICD-10-CM

## 2025-06-12 DIAGNOSIS — M06.09 RHEUMATOID ARTHRITIS OF MULTIPLE SITES WITH NEGATIVE RHEUMATOID FACTOR: Primary | ICD-10-CM

## 2025-06-12 DIAGNOSIS — Z79.899 IMMUNOSUPPRESSION DUE TO DRUG THERAPY: ICD-10-CM

## 2025-06-12 DIAGNOSIS — M79.10 MYALGIA: ICD-10-CM

## 2025-06-12 DIAGNOSIS — G56.41 COMPLEX REGIONAL PAIN SYNDROME TYPE 2 OF RIGHT UPPER EXTREMITY: ICD-10-CM

## 2025-06-12 DIAGNOSIS — D84.821 IMMUNOSUPPRESSION DUE TO DRUG THERAPY: ICD-10-CM

## 2025-06-12 RX ORDER — ETANERCEPT 50 MG/ML
50 SOLUTION SUBCUTANEOUS WEEKLY
Qty: 4 ML | Refills: 2 | Status: SHIPPED | OUTPATIENT
Start: 2025-06-12 | End: 2025-06-16 | Stop reason: SDUPTHER

## 2025-06-12 RX ORDER — HYDROXYCHLOROQUINE SULFATE 200 MG/1
400 TABLET, FILM COATED ORAL DAILY
Qty: 60 TABLET | Refills: 3 | Status: SHIPPED | OUTPATIENT
Start: 2025-06-12

## 2025-06-13 ENCOUNTER — SPECIALTY PHARMACY (OUTPATIENT)
Age: 35
End: 2025-06-13
Payer: MEDICAID

## 2025-06-13 ENCOUNTER — TELEPHONE (OUTPATIENT)
Age: 35
End: 2025-06-13
Payer: MEDICAID

## 2025-06-13 LAB
ALBUMIN SERPL-MCNC: 4.3 G/DL (ref 3.9–4.9)
ALP SERPL-CCNC: 105 IU/L (ref 44–121)
ALT SERPL-CCNC: 10 IU/L (ref 0–32)
AST SERPL-CCNC: 20 IU/L (ref 0–40)
BASOPHILS # BLD AUTO: 0.1 X10E3/UL (ref 0–0.2)
BASOPHILS NFR BLD AUTO: 1 %
BILIRUB SERPL-MCNC: 0.4 MG/DL (ref 0–1.2)
BUN SERPL-MCNC: 8 MG/DL (ref 6–20)
BUN/CREAT SERPL: 9 (ref 9–23)
CALCIUM SERPL-MCNC: 9.1 MG/DL (ref 8.7–10.2)
CHLORIDE SERPL-SCNC: 101 MMOL/L (ref 96–106)
CO2 SERPL-SCNC: 20 MMOL/L (ref 20–29)
CREAT SERPL-MCNC: 0.89 MG/DL (ref 0.57–1)
CRP SERPL-MCNC: 3 MG/L (ref 0–10)
EGFRCR SERPLBLD CKD-EPI 2021: 88 ML/MIN/1.73
EOSINOPHIL # BLD AUTO: 0.4 X10E3/UL (ref 0–0.4)
EOSINOPHIL NFR BLD AUTO: 4 %
ERYTHROCYTE [DISTWIDTH] IN BLOOD BY AUTOMATED COUNT: 13.5 % (ref 11.7–15.4)
ERYTHROCYTE [SEDIMENTATION RATE] IN BLOOD BY WESTERGREN METHOD: 39 MM/HR (ref 0–32)
GLOBULIN SER CALC-MCNC: 2.8 G/DL (ref 1.5–4.5)
GLUCOSE SERPL-MCNC: 106 MG/DL (ref 70–99)
HCT VFR BLD AUTO: 40.7 % (ref 34–46.6)
HGB BLD-MCNC: 12.8 G/DL (ref 11.1–15.9)
IMM GRANULOCYTES # BLD AUTO: 0 X10E3/UL (ref 0–0.1)
IMM GRANULOCYTES NFR BLD AUTO: 0 %
LYMPHOCYTES # BLD AUTO: 2 X10E3/UL (ref 0.7–3.1)
LYMPHOCYTES NFR BLD AUTO: 21 %
MCH RBC QN AUTO: 29 PG (ref 26.6–33)
MCHC RBC AUTO-ENTMCNC: 31.4 G/DL (ref 31.5–35.7)
MCV RBC AUTO: 92 FL (ref 79–97)
MONOCYTES # BLD AUTO: 0.5 X10E3/UL (ref 0.1–0.9)
MONOCYTES NFR BLD AUTO: 6 %
NEUTROPHILS # BLD AUTO: 6.3 X10E3/UL (ref 1.4–7)
NEUTROPHILS NFR BLD AUTO: 68 %
PLATELET # BLD AUTO: 226 X10E3/UL (ref 150–450)
POTASSIUM SERPL-SCNC: 4 MMOL/L (ref 3.5–5.2)
PROT SERPL-MCNC: 7.1 G/DL (ref 6–8.5)
RBC # BLD AUTO: 4.41 X10E6/UL (ref 3.77–5.28)
SODIUM SERPL-SCNC: 139 MMOL/L (ref 134–144)
WBC # BLD AUTO: 9.3 X10E3/UL (ref 3.4–10.8)

## 2025-06-13 NOTE — PROGRESS NOTES
Specialty Pharmacy Patient Management Program  Refill Outreach     Becky was contacted today regarding refills of their medication(s). ENBREL     Refill Questions      Flowsheet Row Most Recent Value   Changes to allergies? No   Changes to medications? No   New conditions or infections since last clinic visit No   Unplanned office visit, urgent care, ED, or hospital admission in the last 4 weeks  No   How does patient/caregiver feel medication is working? Excellent   Financial problems or insurance changes  No   Since the previous refill, were any specialty medication doses or scheduled injections missed or delayed?  No   Does this patient require a clinical escalation to a pharmacist? No            Delivery Questions      Flowsheet Row Most Recent Value   Delivery method UPS   Delivery address verified with patient/caregiver? Yes   Delivery address Home   Number of medications in delivery 1   Medication(s) being filled and delivered Etanercept (Enbrel Mini)   Copay verified? Yes   Copay amount 0   Copay form of payment No copayment ($0)   Delivery Date Selection 06/17/25   Signature Required Yes   Do you consent to receive electronic handouts?  Yes                 Follow-up: 21 day(s)     Kelly Henderson, Pharmacy Technician  6/13/2025  10:34 EDT

## 2025-06-13 NOTE — TELEPHONE ENCOUNTER
Pts meds were sent to [facility] but I was able to call and have that prescription cancelled. Will send refill once we have a new prescription. Patient was able to complete all refill questions on 6/13 for a 6/17 delivery.

## 2025-06-16 ENCOUNTER — SPECIALTY PHARMACY (OUTPATIENT)
Facility: HOSPITAL | Age: 35
End: 2025-06-16
Payer: MEDICAID

## 2025-06-16 ENCOUNTER — SPECIALTY PHARMACY (OUTPATIENT)
Age: 35
End: 2025-06-16
Payer: MEDICAID

## 2025-06-16 RX ORDER — ETANERCEPT 50 MG/ML
50 SOLUTION SUBCUTANEOUS WEEKLY
Qty: 4 ML | Refills: 2 | Status: SHIPPED | OUTPATIENT
Start: 2025-06-16

## 2025-06-16 NOTE — PROGRESS NOTES
Specialty Pharmacy Patient Management Program  Per Protocol Prescription Order/Refill     Patient currently fills medications at [facility] and is enrolled in an Rheumatology Patient Management Program.     Requested Prescriptions     Signed Prescriptions Disp Refills    Enbrel Mini 50 MG/ML solution cartridge 4 mL 2     Sig: Inject 50 mg under the skin into the appropriate area as directed 1 (One) Time Per Week.     Prescription orders above were sent to the pharmacy per Collaborative Care Agreement Protocol.

## 2025-07-10 ENCOUNTER — SPECIALTY PHARMACY (OUTPATIENT)
Age: 35
End: 2025-07-10
Payer: MEDICAID

## 2025-07-14 ENCOUNTER — SPECIALTY PHARMACY (OUTPATIENT)
Age: 35
End: 2025-07-14
Payer: MEDICAID

## 2025-07-15 ENCOUNTER — SPECIALTY PHARMACY (OUTPATIENT)
Age: 35
End: 2025-07-15
Payer: MEDICAID

## 2025-07-18 ENCOUNTER — SPECIALTY PHARMACY (OUTPATIENT)
Age: 35
End: 2025-07-18
Payer: MEDICAID

## 2025-07-23 ENCOUNTER — SPECIALTY PHARMACY (OUTPATIENT)
Age: 35
End: 2025-07-23
Payer: MEDICAID

## 2025-07-23 NOTE — PROGRESS NOTES
Specialty Pharmacy Patient Management Program  Refill Outreach     Becky was contacted today regarding refills of their medication(s).    Refill Questions      Flowsheet Row Most Recent Value   Changes to allergies? No   Changes to medications? No   New conditions or infections since last clinic visit No   Unplanned office visit, urgent care, ED, or hospital admission in the last 4 weeks  No   How does patient/caregiver feel medication is working? Good   Financial problems or insurance changes  No   Since the previous refill, were any specialty medication doses or scheduled injections missed or delayed?  No   Does this patient require a clinical escalation to a pharmacist? No            Delivery Questions      Flowsheet Row Most Recent Value   Delivery method UPS   Delivery address verified with patient/caregiver? Yes   Delivery address Home   Number of medications in delivery 1   Medication(s) being filled and delivered Etanercept (Enbrel Mini)   Doses left of specialty medications 2 boxes   Copay verified? Yes   Copay amount 0   Copay form of payment No copayment ($0)   Delivery Date Selection 07/24/25   Signature Required Yes   Do you consent to receive electronic handouts?  Yes                 Follow-up: 21 day(s)     Marilin Conde, Pharmacy Technician  7/23/2025  12:20 EDT

## 2025-07-28 DIAGNOSIS — M05.79 RHEUMATOID ARTHRITIS INVOLVING MULTIPLE SITES WITH POSITIVE RHEUMATOID FACTOR: ICD-10-CM

## 2025-07-28 DIAGNOSIS — G56.41 COMPLEX REGIONAL PAIN SYNDROME TYPE 2 OF RIGHT UPPER EXTREMITY: ICD-10-CM

## 2025-07-28 NOTE — TELEPHONE ENCOUNTER
Rx Refill Note  Requested Prescriptions     Pending Prescriptions Disp Refills    gabapentin (NEURONTIN) 800 MG tablet 60 tablet 3     Sig: Take 1 tablet by mouth 2 (Two) Times a Day.      Last office visit with prescribing clinician: 5/12/2025   Last telemedicine visit with prescribing clinician: Visit date not found   Next office visit with prescribing clinician: Visit date not found                         Would you like a call back once the refill request has been completed: [] Yes [] No    If the office needs to give you a call back, can they leave a voicemail: [] Yes [] No    Yvonne Al MA  07/28/25, 09:09 EDT

## 2025-07-30 RX ORDER — GABAPENTIN 800 MG/1
800 TABLET ORAL 2 TIMES DAILY
Qty: 60 TABLET | Refills: 3 | Status: SHIPPED | OUTPATIENT
Start: 2025-07-30

## 2025-08-04 DIAGNOSIS — M79.18 CERVICAL MYOFASCIAL PAIN SYNDROME: Chronic | ICD-10-CM

## 2025-08-04 DIAGNOSIS — M05.79 RHEUMATOID ARTHRITIS INVOLVING MULTIPLE SITES WITH POSITIVE RHEUMATOID FACTOR: ICD-10-CM

## 2025-08-04 DIAGNOSIS — M25.50 POLYARTHRALGIA: ICD-10-CM

## 2025-08-04 DIAGNOSIS — G56.41 COMPLEX REGIONAL PAIN SYNDROME TYPE 2 OF RIGHT UPPER EXTREMITY: ICD-10-CM

## 2025-08-04 RX ORDER — DICLOFENAC SODIUM 25 MG/1
25 TABLET, DELAYED RELEASE ORAL 2 TIMES DAILY
Qty: 180 TABLET | Refills: 2 | Status: SHIPPED | OUTPATIENT
Start: 2025-08-04

## 2025-08-19 ENCOUNTER — SPECIALTY PHARMACY (OUTPATIENT)
Age: 35
End: 2025-08-19
Payer: MEDICAID

## (undated) DEVICE — VLV SXN AIR/H2O ORCAPOD3 1P/U STRL

## (undated) DEVICE — HYBRID CO2 TUBING/CAP SET FOR OLYMPUS® SCOPES & CO2 SOURCE: Brand: ERBE

## (undated) DEVICE — LUBE JELLY PK/2.75GM STRL BX/144

## (undated) DEVICE — SUCTION CANISTER, 1500CC, RIGID: Brand: DEROYAL

## (undated) DEVICE — MEDI-VAC NON-CONDUCTIVE SUCTION TUBING: Brand: CARDINAL HEALTH

## (undated) DEVICE — CONMED SCOPE SAVER BITE BLOCK, 20X27 MM: Brand: SCOPE SAVER

## (undated) DEVICE — FRCP BX RADJAW4 NDL 2.8 240 STD OG

## (undated) DEVICE — SYR LUER SLPTP 50ML

## (undated) DEVICE — ENDOSCOPY PORT CONNECTOR FOR OLYMPUS® SCOPES: Brand: ERBE

## (undated) DEVICE — PATIENT RETURN ELECTRODE, SINGLE-USE, CONTACT QUALITY MONITORING, ADULT, WITH 9FT CORD, FOR PATIENTS WEIGING OVER 33LBS. (15KG): Brand: MEGADYNE